# Patient Record
Sex: MALE | Race: WHITE | NOT HISPANIC OR LATINO | Employment: FULL TIME | URBAN - METROPOLITAN AREA
[De-identification: names, ages, dates, MRNs, and addresses within clinical notes are randomized per-mention and may not be internally consistent; named-entity substitution may affect disease eponyms.]

---

## 2017-01-23 ENCOUNTER — OFFICE VISIT - HEALTHEAST (OUTPATIENT)
Dept: CARDIOLOGY | Facility: CLINIC | Age: 62
End: 2017-01-23

## 2017-01-23 DIAGNOSIS — I25.84 CORONARY ATHEROSCLEROSIS DUE TO CALCIFIED CORONARY LESION: ICD-10-CM

## 2017-01-23 DIAGNOSIS — I25.10 CORONARY ATHEROSCLEROSIS DUE TO CALCIFIED CORONARY LESION: ICD-10-CM

## 2017-01-23 LAB
CHOLEST SERPL-MCNC: 135 MG/DL
FASTING STATUS PATIENT QL REPORTED: YES
HDLC SERPL-MCNC: 31 MG/DL
LDLC SERPL CALC-MCNC: 82 MG/DL
TRIGL SERPL-MCNC: 110 MG/DL

## 2017-01-23 ASSESSMENT — MIFFLIN-ST. JEOR: SCORE: 1463.04

## 2017-03-09 ENCOUNTER — COMMUNICATION - HEALTHEAST (OUTPATIENT)
Dept: CARDIOLOGY | Facility: CLINIC | Age: 62
End: 2017-03-09

## 2017-03-09 DIAGNOSIS — I25.10 CORONARY ATHEROSCLEROSIS: ICD-10-CM

## 2017-04-11 ENCOUNTER — COMMUNICATION - HEALTHEAST (OUTPATIENT)
Dept: CARDIOLOGY | Facility: CLINIC | Age: 62
End: 2017-04-11

## 2017-04-11 DIAGNOSIS — E78.5 HYPERLIPIDEMIA: ICD-10-CM

## 2017-07-03 ENCOUNTER — COMMUNICATION - HEALTHEAST (OUTPATIENT)
Dept: CARDIOLOGY | Facility: CLINIC | Age: 62
End: 2017-07-03

## 2017-07-03 DIAGNOSIS — I25.10 CORONARY ATHEROSCLEROSIS: ICD-10-CM

## 2017-07-27 ENCOUNTER — COMMUNICATION - HEALTHEAST (OUTPATIENT)
Dept: CARDIOLOGY | Facility: CLINIC | Age: 62
End: 2017-07-27

## 2017-07-27 DIAGNOSIS — E78.5 HYPERLIPIDEMIA: ICD-10-CM

## 2017-12-02 ENCOUNTER — COMMUNICATION - HEALTHEAST (OUTPATIENT)
Dept: CARDIOLOGY | Facility: CLINIC | Age: 62
End: 2017-12-02

## 2017-12-02 DIAGNOSIS — E78.5 HYPERLIPIDEMIA: ICD-10-CM

## 2018-01-12 ENCOUNTER — COMMUNICATION - HEALTHEAST (OUTPATIENT)
Dept: ADMINISTRATIVE | Facility: CLINIC | Age: 63
End: 2018-01-12

## 2018-02-10 ENCOUNTER — COMMUNICATION - HEALTHEAST (OUTPATIENT)
Dept: CARDIOLOGY | Facility: CLINIC | Age: 63
End: 2018-02-10

## 2018-02-10 DIAGNOSIS — I25.10 CORONARY ATHEROSCLEROSIS: ICD-10-CM

## 2018-02-27 ENCOUNTER — COMMUNICATION - HEALTHEAST (OUTPATIENT)
Dept: CARDIOLOGY | Facility: CLINIC | Age: 63
End: 2018-02-27

## 2018-02-27 DIAGNOSIS — I25.10 CORONARY ATHEROSCLEROSIS: ICD-10-CM

## 2018-03-17 ENCOUNTER — COMMUNICATION - HEALTHEAST (OUTPATIENT)
Dept: CARDIOLOGY | Facility: CLINIC | Age: 63
End: 2018-03-17

## 2018-03-17 DIAGNOSIS — E78.5 HYPERLIPIDEMIA: ICD-10-CM

## 2018-03-30 ENCOUNTER — COMMUNICATION - HEALTHEAST (OUTPATIENT)
Dept: CARDIOLOGY | Facility: CLINIC | Age: 63
End: 2018-03-30

## 2018-03-30 ENCOUNTER — OFFICE VISIT - HEALTHEAST (OUTPATIENT)
Dept: CARDIOLOGY | Facility: CLINIC | Age: 63
End: 2018-03-30

## 2018-03-30 DIAGNOSIS — I25.84 CORONARY ATHEROSCLEROSIS DUE TO CALCIFIED CORONARY LESION: ICD-10-CM

## 2018-03-30 DIAGNOSIS — I25.10 CORONARY ATHEROSCLEROSIS DUE TO CALCIFIED CORONARY LESION: ICD-10-CM

## 2018-03-30 LAB
ANION GAP SERPL CALCULATED.3IONS-SCNC: 9 MMOL/L (ref 5–18)
BUN SERPL-MCNC: 20 MG/DL (ref 8–22)
CALCIUM SERPL-MCNC: 9.6 MG/DL (ref 8.5–10.5)
CHLORIDE BLD-SCNC: 106 MMOL/L (ref 98–107)
CHOLEST SERPL-MCNC: 186 MG/DL
CO2 SERPL-SCNC: 23 MMOL/L (ref 22–31)
CREAT SERPL-MCNC: 0.84 MG/DL (ref 0.7–1.3)
ERYTHROCYTE [DISTWIDTH] IN BLOOD BY AUTOMATED COUNT: 12.5 % (ref 11–14.5)
FASTING STATUS PATIENT QL REPORTED: YES
GFR SERPL CREATININE-BSD FRML MDRD: >60 ML/MIN/1.73M2
GLUCOSE BLD-MCNC: 94 MG/DL (ref 70–125)
HCT VFR BLD AUTO: 51.1 % (ref 40–54)
HDLC SERPL-MCNC: 39 MG/DL
HGB BLD-MCNC: 17.3 G/DL (ref 14–18)
LDLC SERPL CALC-MCNC: 105 MG/DL
MCH RBC QN AUTO: 32.1 PG (ref 27–34)
MCHC RBC AUTO-ENTMCNC: 33.9 G/DL (ref 32–36)
MCV RBC AUTO: 95 FL (ref 80–100)
PLATELET # BLD AUTO: 279 THOU/UL (ref 140–440)
PMV BLD AUTO: 9.8 FL (ref 8.5–12.5)
POTASSIUM BLD-SCNC: 4.7 MMOL/L (ref 3.5–5)
RBC # BLD AUTO: 5.39 MILL/UL (ref 4.4–6.2)
SODIUM SERPL-SCNC: 138 MMOL/L (ref 136–145)
TRIGL SERPL-MCNC: 212 MG/DL
WBC: 7.1 THOU/UL (ref 4–11)

## 2018-03-30 ASSESSMENT — MIFFLIN-ST. JEOR: SCORE: 1516.79

## 2018-05-27 ENCOUNTER — COMMUNICATION - HEALTHEAST (OUTPATIENT)
Dept: CARDIOLOGY | Facility: CLINIC | Age: 63
End: 2018-05-27

## 2018-05-27 DIAGNOSIS — I25.10 CORONARY ATHEROSCLEROSIS: ICD-10-CM

## 2018-06-12 ENCOUNTER — COMMUNICATION - HEALTHEAST (OUTPATIENT)
Dept: CARDIOLOGY | Facility: CLINIC | Age: 63
End: 2018-06-12

## 2018-06-12 DIAGNOSIS — I25.10 CORONARY ATHEROSCLEROSIS: ICD-10-CM

## 2018-07-10 ENCOUNTER — COMMUNICATION - HEALTHEAST (OUTPATIENT)
Dept: CARDIOLOGY | Facility: CLINIC | Age: 63
End: 2018-07-10

## 2018-07-10 DIAGNOSIS — E78.5 HYPERLIPIDEMIA: ICD-10-CM

## 2018-11-08 ENCOUNTER — COMMUNICATION - HEALTHEAST (OUTPATIENT)
Dept: CARDIOLOGY | Facility: CLINIC | Age: 63
End: 2018-11-08

## 2018-11-08 DIAGNOSIS — E78.5 HYPERLIPIDEMIA: ICD-10-CM

## 2018-11-08 DIAGNOSIS — I25.10 CORONARY ATHEROSCLEROSIS: ICD-10-CM

## 2018-11-12 ENCOUNTER — COMMUNICATION - HEALTHEAST (OUTPATIENT)
Dept: CARDIOLOGY | Facility: CLINIC | Age: 63
End: 2018-11-12

## 2018-11-12 DIAGNOSIS — I25.10 CORONARY ATHEROSCLEROSIS: ICD-10-CM

## 2018-11-12 DIAGNOSIS — E78.5 HYPERLIPIDEMIA: ICD-10-CM

## 2018-12-21 ENCOUNTER — COMMUNICATION - HEALTHEAST (OUTPATIENT)
Dept: CARDIOLOGY | Facility: CLINIC | Age: 63
End: 2018-12-21

## 2018-12-21 DIAGNOSIS — I25.10 CORONARY ATHEROSCLEROSIS: ICD-10-CM

## 2019-02-07 ENCOUNTER — COMMUNICATION - HEALTHEAST (OUTPATIENT)
Dept: ADMINISTRATIVE | Facility: CLINIC | Age: 64
End: 2019-02-07

## 2019-02-18 ENCOUNTER — SURGERY - HEALTHEAST (OUTPATIENT)
Dept: CARDIOLOGY | Facility: CLINIC | Age: 64
End: 2019-02-18

## 2019-02-18 ASSESSMENT — MIFFLIN-ST. JEOR
SCORE: 1493.75
SCORE: 1493.75

## 2019-02-19 ENCOUNTER — ANESTHESIA - HEALTHEAST (OUTPATIENT)
Dept: SURGERY | Facility: CLINIC | Age: 64
End: 2019-02-19

## 2019-02-19 ASSESSMENT — MIFFLIN-ST. JEOR
SCORE: 1456.69
SCORE: 1456.69

## 2019-02-20 ENCOUNTER — SURGERY - HEALTHEAST (OUTPATIENT)
Dept: SURGERY | Facility: CLINIC | Age: 64
End: 2019-02-20

## 2019-02-20 ASSESSMENT — MIFFLIN-ST. JEOR
SCORE: 1479.82
SCORE: 1479.82

## 2019-02-21 ASSESSMENT — MIFFLIN-ST. JEOR
SCORE: 1512.48
SCORE: 1512.48

## 2019-02-22 ASSESSMENT — MIFFLIN-ST. JEOR
SCORE: 1495.36
SCORE: 1495.36

## 2019-02-23 ASSESSMENT — MIFFLIN-ST. JEOR
SCORE: 1495.25
SCORE: 1477.56
SCORE: 1477.56
SCORE: 1495.25

## 2019-02-24 ASSESSMENT — MIFFLIN-ST. JEOR
SCORE: 1456.24
SCORE: 1456.24

## 2019-02-25 ENCOUNTER — COMMUNICATION - HEALTHEAST (OUTPATIENT)
Dept: FAMILY MEDICINE | Facility: CLINIC | Age: 64
End: 2019-02-25

## 2019-02-25 ASSESSMENT — MIFFLIN-ST. JEOR
SCORE: 1458.96
SCORE: 1458.96

## 2019-03-01 ENCOUNTER — OFFICE VISIT - HEALTHEAST (OUTPATIENT)
Dept: FAMILY MEDICINE | Facility: CLINIC | Age: 64
End: 2019-03-01

## 2019-03-01 DIAGNOSIS — I10 ESSENTIAL HYPERTENSION: ICD-10-CM

## 2019-03-01 DIAGNOSIS — E78.2 MIXED HYPERLIPIDEMIA: ICD-10-CM

## 2019-03-01 DIAGNOSIS — Z71.89 COUNSELING ON HEALTH CARE DIRECTIVE: ICD-10-CM

## 2019-03-01 DIAGNOSIS — I21.4 NON-ST ELEVATION MYOCARDIAL INFARCTION (NSTEMI) (H): ICD-10-CM

## 2019-03-01 DIAGNOSIS — Z95.1 S/P CABG (CORONARY ARTERY BYPASS GRAFT): ICD-10-CM

## 2019-03-01 ASSESSMENT — MIFFLIN-ST. JEOR: SCORE: 1453.4

## 2019-03-04 ENCOUNTER — AMBULATORY - HEALTHEAST (OUTPATIENT)
Dept: CARDIAC REHAB | Facility: CLINIC | Age: 64
End: 2019-03-04

## 2019-03-04 DIAGNOSIS — Z95.1 S/P CABG (CORONARY ARTERY BYPASS GRAFT): ICD-10-CM

## 2019-03-04 DIAGNOSIS — I21.4 NON-ST ELEVATION MYOCARDIAL INFARCTION (NSTEMI) (H): ICD-10-CM

## 2019-03-07 ENCOUNTER — AMBULATORY - HEALTHEAST (OUTPATIENT)
Dept: CARDIAC REHAB | Facility: CLINIC | Age: 64
End: 2019-03-07

## 2019-03-07 DIAGNOSIS — I21.4 NON-ST ELEVATION MYOCARDIAL INFARCTION (NSTEMI) (H): ICD-10-CM

## 2019-03-07 DIAGNOSIS — Z95.1 S/P CABG (CORONARY ARTERY BYPASS GRAFT): ICD-10-CM

## 2019-03-12 ENCOUNTER — AMBULATORY - HEALTHEAST (OUTPATIENT)
Dept: CARDIAC REHAB | Facility: CLINIC | Age: 64
End: 2019-03-12

## 2019-03-12 DIAGNOSIS — I21.4 NON-ST ELEVATION MYOCARDIAL INFARCTION (NSTEMI) (H): ICD-10-CM

## 2019-03-12 DIAGNOSIS — Z95.1 S/P CABG (CORONARY ARTERY BYPASS GRAFT): ICD-10-CM

## 2019-03-14 ENCOUNTER — AMBULATORY - HEALTHEAST (OUTPATIENT)
Dept: CARDIAC REHAB | Facility: CLINIC | Age: 64
End: 2019-03-14

## 2019-03-14 DIAGNOSIS — Z95.1 S/P CABG (CORONARY ARTERY BYPASS GRAFT): ICD-10-CM

## 2019-03-14 DIAGNOSIS — I21.4 NON-ST ELEVATION MYOCARDIAL INFARCTION (NSTEMI) (H): ICD-10-CM

## 2019-03-19 ENCOUNTER — AMBULATORY - HEALTHEAST (OUTPATIENT)
Dept: CARDIAC REHAB | Facility: CLINIC | Age: 64
End: 2019-03-19

## 2019-03-19 DIAGNOSIS — I21.4 NON-ST ELEVATION MYOCARDIAL INFARCTION (NSTEMI) (H): ICD-10-CM

## 2019-03-19 DIAGNOSIS — Z95.1 S/P CABG (CORONARY ARTERY BYPASS GRAFT): ICD-10-CM

## 2019-03-21 ENCOUNTER — AMBULATORY - HEALTHEAST (OUTPATIENT)
Dept: CARDIAC REHAB | Facility: CLINIC | Age: 64
End: 2019-03-21

## 2019-03-21 DIAGNOSIS — Z95.1 S/P CABG (CORONARY ARTERY BYPASS GRAFT): ICD-10-CM

## 2019-03-21 DIAGNOSIS — I21.4 NON-ST ELEVATION MYOCARDIAL INFARCTION (NSTEMI) (H): ICD-10-CM

## 2019-03-26 ENCOUNTER — OFFICE VISIT - HEALTHEAST (OUTPATIENT)
Dept: CARDIOLOGY | Facility: CLINIC | Age: 64
End: 2019-03-26

## 2019-03-26 ENCOUNTER — AMBULATORY - HEALTHEAST (OUTPATIENT)
Dept: CARDIAC REHAB | Facility: CLINIC | Age: 64
End: 2019-03-26

## 2019-03-26 DIAGNOSIS — G62.9 NEUROPATHY: ICD-10-CM

## 2019-03-26 DIAGNOSIS — I21.4 NON-ST ELEVATION MYOCARDIAL INFARCTION (NSTEMI) (H): ICD-10-CM

## 2019-03-26 DIAGNOSIS — Z95.1 S/P CABG (CORONARY ARTERY BYPASS GRAFT): ICD-10-CM

## 2019-03-26 ASSESSMENT — MIFFLIN-ST. JEOR: SCORE: 1462.48

## 2019-03-28 ENCOUNTER — AMBULATORY - HEALTHEAST (OUTPATIENT)
Dept: CARDIAC REHAB | Facility: CLINIC | Age: 64
End: 2019-03-28

## 2019-03-28 DIAGNOSIS — I21.4 NON-ST ELEVATION MYOCARDIAL INFARCTION (NSTEMI) (H): ICD-10-CM

## 2019-03-28 DIAGNOSIS — Z95.1 S/P CABG (CORONARY ARTERY BYPASS GRAFT): ICD-10-CM

## 2019-04-02 ENCOUNTER — AMBULATORY - HEALTHEAST (OUTPATIENT)
Dept: CARDIAC REHAB | Facility: CLINIC | Age: 64
End: 2019-04-02

## 2019-04-02 DIAGNOSIS — I21.4 NON-ST ELEVATION MYOCARDIAL INFARCTION (NSTEMI) (H): ICD-10-CM

## 2019-04-02 DIAGNOSIS — Z95.1 S/P CABG (CORONARY ARTERY BYPASS GRAFT): ICD-10-CM

## 2019-04-04 ENCOUNTER — AMBULATORY - HEALTHEAST (OUTPATIENT)
Dept: CARDIAC REHAB | Facility: CLINIC | Age: 64
End: 2019-04-04

## 2019-04-04 DIAGNOSIS — Z95.1 S/P CABG (CORONARY ARTERY BYPASS GRAFT): ICD-10-CM

## 2019-04-04 DIAGNOSIS — I21.4 NON-ST ELEVATION MYOCARDIAL INFARCTION (NSTEMI) (H): ICD-10-CM

## 2019-04-09 ENCOUNTER — AMBULATORY - HEALTHEAST (OUTPATIENT)
Dept: CARDIAC REHAB | Facility: CLINIC | Age: 64
End: 2019-04-09

## 2019-04-09 DIAGNOSIS — I21.4 NON-ST ELEVATION MYOCARDIAL INFARCTION (NSTEMI) (H): ICD-10-CM

## 2019-04-09 DIAGNOSIS — Z95.1 S/P CABG (CORONARY ARTERY BYPASS GRAFT): ICD-10-CM

## 2019-04-11 ENCOUNTER — AMBULATORY - HEALTHEAST (OUTPATIENT)
Dept: CARDIAC REHAB | Facility: CLINIC | Age: 64
End: 2019-04-11

## 2019-04-11 DIAGNOSIS — Z95.1 S/P CABG (CORONARY ARTERY BYPASS GRAFT): ICD-10-CM

## 2019-04-11 DIAGNOSIS — I21.4 NON-ST ELEVATION MYOCARDIAL INFARCTION (NSTEMI) (H): ICD-10-CM

## 2019-04-12 ENCOUNTER — OFFICE VISIT - HEALTHEAST (OUTPATIENT)
Dept: CARDIOLOGY | Facility: CLINIC | Age: 64
End: 2019-04-12

## 2019-04-12 DIAGNOSIS — I25.10 CORONARY ARTERY DISEASE INVOLVING NATIVE CORONARY ARTERY OF NATIVE HEART WITHOUT ANGINA PECTORIS: ICD-10-CM

## 2019-04-12 DIAGNOSIS — Z95.1 S/P CABG (CORONARY ARTERY BYPASS GRAFT): ICD-10-CM

## 2019-04-12 LAB — LDLC SERPL CALC-MCNC: 128 MG/DL

## 2019-04-12 ASSESSMENT — MIFFLIN-ST. JEOR: SCORE: 1467.01

## 2019-04-15 ENCOUNTER — COMMUNICATION - HEALTHEAST (OUTPATIENT)
Dept: CARDIOLOGY | Facility: CLINIC | Age: 64
End: 2019-04-15

## 2019-04-15 DIAGNOSIS — Z95.1 S/P CABG (CORONARY ARTERY BYPASS GRAFT): ICD-10-CM

## 2019-04-15 DIAGNOSIS — I25.10 CORONARY ARTERY DISEASE INVOLVING NATIVE CORONARY ARTERY OF NATIVE HEART WITHOUT ANGINA PECTORIS: ICD-10-CM

## 2019-04-16 ENCOUNTER — AMBULATORY - HEALTHEAST (OUTPATIENT)
Dept: CARDIAC REHAB | Facility: CLINIC | Age: 64
End: 2019-04-16

## 2019-04-16 ENCOUNTER — COMMUNICATION - HEALTHEAST (OUTPATIENT)
Dept: CARDIOLOGY | Facility: CLINIC | Age: 64
End: 2019-04-16

## 2019-04-16 DIAGNOSIS — I21.4 NON-ST ELEVATION MYOCARDIAL INFARCTION (NSTEMI) (H): ICD-10-CM

## 2019-04-16 DIAGNOSIS — Z95.1 S/P CABG (CORONARY ARTERY BYPASS GRAFT): ICD-10-CM

## 2019-04-16 DIAGNOSIS — E78.5 HYPERLIPIDEMIA: ICD-10-CM

## 2019-04-18 ENCOUNTER — AMBULATORY - HEALTHEAST (OUTPATIENT)
Dept: CARDIAC REHAB | Facility: CLINIC | Age: 64
End: 2019-04-18

## 2019-04-18 DIAGNOSIS — I21.4 NON-ST ELEVATION MYOCARDIAL INFARCTION (NSTEMI) (H): ICD-10-CM

## 2019-04-18 DIAGNOSIS — Z95.1 S/P CABG (CORONARY ARTERY BYPASS GRAFT): ICD-10-CM

## 2019-04-24 ENCOUNTER — RECORDS - HEALTHEAST (OUTPATIENT)
Dept: ADMINISTRATIVE | Facility: OTHER | Age: 64
End: 2019-04-24

## 2019-04-25 ENCOUNTER — AMBULATORY - HEALTHEAST (OUTPATIENT)
Dept: CARDIAC REHAB | Facility: CLINIC | Age: 64
End: 2019-04-25

## 2019-04-25 DIAGNOSIS — I21.4 NON-ST ELEVATION MYOCARDIAL INFARCTION (NSTEMI) (H): ICD-10-CM

## 2019-04-25 DIAGNOSIS — Z95.1 S/P CABG (CORONARY ARTERY BYPASS GRAFT): ICD-10-CM

## 2019-05-02 ENCOUNTER — AMBULATORY - HEALTHEAST (OUTPATIENT)
Dept: CARDIAC REHAB | Facility: CLINIC | Age: 64
End: 2019-05-02

## 2019-05-02 DIAGNOSIS — Z95.1 S/P CABG (CORONARY ARTERY BYPASS GRAFT): ICD-10-CM

## 2019-05-02 DIAGNOSIS — I21.4 NON-ST ELEVATION MYOCARDIAL INFARCTION (NSTEMI) (H): ICD-10-CM

## 2019-05-09 ENCOUNTER — COMMUNICATION - HEALTHEAST (OUTPATIENT)
Dept: ADMINISTRATIVE | Facility: CLINIC | Age: 64
End: 2019-05-09

## 2019-05-22 ENCOUNTER — OFFICE VISIT - HEALTHEAST (OUTPATIENT)
Dept: FAMILY MEDICINE | Facility: CLINIC | Age: 64
End: 2019-05-22

## 2019-05-22 ENCOUNTER — COMMUNICATION - HEALTHEAST (OUTPATIENT)
Dept: FAMILY MEDICINE | Facility: CLINIC | Age: 64
End: 2019-05-22

## 2019-05-22 DIAGNOSIS — Z12.11 SCREEN FOR COLON CANCER: ICD-10-CM

## 2019-05-22 DIAGNOSIS — I25.2 HISTORY OF NON-ST ELEVATION MYOCARDIAL INFARCTION (NSTEMI): ICD-10-CM

## 2019-05-22 ASSESSMENT — MIFFLIN-ST. JEOR: SCORE: 1480.62

## 2019-05-23 ENCOUNTER — AMBULATORY - HEALTHEAST (OUTPATIENT)
Dept: CARDIAC REHAB | Facility: CLINIC | Age: 64
End: 2019-05-23

## 2019-05-23 DIAGNOSIS — I21.4 NON-ST ELEVATION MYOCARDIAL INFARCTION (NSTEMI) (H): ICD-10-CM

## 2019-05-23 DIAGNOSIS — Z95.1 S/P CABG (CORONARY ARTERY BYPASS GRAFT): ICD-10-CM

## 2019-05-30 ENCOUNTER — AMBULATORY - HEALTHEAST (OUTPATIENT)
Dept: CARDIAC REHAB | Facility: CLINIC | Age: 64
End: 2019-05-30

## 2019-05-30 DIAGNOSIS — I21.4 NON-ST ELEVATION MYOCARDIAL INFARCTION (NSTEMI) (H): ICD-10-CM

## 2019-05-30 DIAGNOSIS — Z95.1 S/P CABG (CORONARY ARTERY BYPASS GRAFT): ICD-10-CM

## 2019-06-06 ENCOUNTER — AMBULATORY - HEALTHEAST (OUTPATIENT)
Dept: CARDIAC REHAB | Facility: CLINIC | Age: 64
End: 2019-06-06

## 2019-06-06 DIAGNOSIS — Z95.1 S/P CABG (CORONARY ARTERY BYPASS GRAFT): ICD-10-CM

## 2019-06-06 DIAGNOSIS — I21.4 NON-ST ELEVATION MYOCARDIAL INFARCTION (NSTEMI) (H): ICD-10-CM

## 2019-06-20 ENCOUNTER — AMBULATORY - HEALTHEAST (OUTPATIENT)
Dept: CARDIAC REHAB | Facility: CLINIC | Age: 64
End: 2019-06-20

## 2019-06-20 DIAGNOSIS — I21.4 NON-ST ELEVATION MYOCARDIAL INFARCTION (NSTEMI) (H): ICD-10-CM

## 2019-06-20 DIAGNOSIS — Z95.1 S/P CABG (CORONARY ARTERY BYPASS GRAFT): ICD-10-CM

## 2019-07-06 ENCOUNTER — COMMUNICATION - HEALTHEAST (OUTPATIENT)
Dept: CARDIOLOGY | Facility: CLINIC | Age: 64
End: 2019-07-06

## 2019-07-06 DIAGNOSIS — Z95.1 S/P CABG (CORONARY ARTERY BYPASS GRAFT): ICD-10-CM

## 2019-07-11 ENCOUNTER — AMBULATORY - HEALTHEAST (OUTPATIENT)
Dept: CARDIAC REHAB | Facility: CLINIC | Age: 64
End: 2019-07-11

## 2019-07-11 DIAGNOSIS — I21.4 NON-ST ELEVATION MYOCARDIAL INFARCTION (NSTEMI) (H): ICD-10-CM

## 2019-07-11 DIAGNOSIS — Z95.1 S/P CABG (CORONARY ARTERY BYPASS GRAFT): ICD-10-CM

## 2019-07-15 ENCOUNTER — COMMUNICATION - HEALTHEAST (OUTPATIENT)
Dept: CARDIOLOGY | Facility: CLINIC | Age: 64
End: 2019-07-15

## 2019-07-15 DIAGNOSIS — Z95.1 S/P CABG (CORONARY ARTERY BYPASS GRAFT): ICD-10-CM

## 2019-07-17 ENCOUNTER — COMMUNICATION - HEALTHEAST (OUTPATIENT)
Dept: CARDIOLOGY | Facility: CLINIC | Age: 64
End: 2019-07-17

## 2019-07-17 DIAGNOSIS — Z95.1 S/P CABG (CORONARY ARTERY BYPASS GRAFT): ICD-10-CM

## 2019-07-18 ENCOUNTER — AMBULATORY - HEALTHEAST (OUTPATIENT)
Dept: CARDIAC REHAB | Facility: CLINIC | Age: 64
End: 2019-07-18

## 2019-07-18 DIAGNOSIS — I21.4 NON-ST ELEVATION MYOCARDIAL INFARCTION (NSTEMI) (H): ICD-10-CM

## 2019-07-18 DIAGNOSIS — Z95.1 S/P CABG (CORONARY ARTERY BYPASS GRAFT): ICD-10-CM

## 2019-07-25 ENCOUNTER — AMBULATORY - HEALTHEAST (OUTPATIENT)
Dept: CARDIAC REHAB | Facility: CLINIC | Age: 64
End: 2019-07-25

## 2019-07-25 ENCOUNTER — OFFICE VISIT - HEALTHEAST (OUTPATIENT)
Dept: CARDIOLOGY | Facility: CLINIC | Age: 64
End: 2019-07-25

## 2019-07-25 DIAGNOSIS — Z95.1 S/P CABG (CORONARY ARTERY BYPASS GRAFT): ICD-10-CM

## 2019-07-25 DIAGNOSIS — I25.10 CORONARY ARTERY DISEASE INVOLVING NATIVE CORONARY ARTERY OF NATIVE HEART WITHOUT ANGINA PECTORIS: ICD-10-CM

## 2019-07-25 DIAGNOSIS — E78.2 MIXED HYPERLIPIDEMIA: ICD-10-CM

## 2019-07-25 DIAGNOSIS — I21.4 NON-ST ELEVATION MYOCARDIAL INFARCTION (NSTEMI) (H): ICD-10-CM

## 2019-07-25 LAB
CHOLEST SERPL-MCNC: 84 MG/DL
FASTING STATUS PATIENT QL REPORTED: YES
HDLC SERPL-MCNC: 38 MG/DL
LDLC SERPL CALC-MCNC: 14 MG/DL
TRIGL SERPL-MCNC: 159 MG/DL

## 2019-07-25 ASSESSMENT — MIFFLIN-ST. JEOR: SCORE: 1489.69

## 2019-07-26 ENCOUNTER — COMMUNICATION - HEALTHEAST (OUTPATIENT)
Dept: CARDIOLOGY | Facility: CLINIC | Age: 64
End: 2019-07-26

## 2019-08-22 ENCOUNTER — RECORDS - HEALTHEAST (OUTPATIENT)
Dept: ADMINISTRATIVE | Facility: OTHER | Age: 64
End: 2019-08-22

## 2019-09-27 ENCOUNTER — OFFICE VISIT - HEALTHEAST (OUTPATIENT)
Dept: FAMILY MEDICINE | Facility: CLINIC | Age: 64
End: 2019-09-27

## 2019-09-27 DIAGNOSIS — Z23 NEEDS FLU SHOT: ICD-10-CM

## 2019-09-27 DIAGNOSIS — H61.21 IMPACTED CERUMEN OF RIGHT EAR: ICD-10-CM

## 2019-09-27 ASSESSMENT — MIFFLIN-ST. JEOR: SCORE: 1512.37

## 2019-10-02 ENCOUNTER — COMMUNICATION - HEALTHEAST (OUTPATIENT)
Dept: CARDIOLOGY | Facility: CLINIC | Age: 64
End: 2019-10-02

## 2019-10-02 DIAGNOSIS — Z95.1 S/P CABG (CORONARY ARTERY BYPASS GRAFT): ICD-10-CM

## 2019-10-13 ENCOUNTER — COMMUNICATION - HEALTHEAST (OUTPATIENT)
Dept: CARDIOLOGY | Facility: CLINIC | Age: 64
End: 2019-10-13

## 2019-10-13 DIAGNOSIS — E78.5 HYPERLIPIDEMIA: ICD-10-CM

## 2020-01-09 ENCOUNTER — COMMUNICATION - HEALTHEAST (OUTPATIENT)
Dept: FAMILY MEDICINE | Facility: CLINIC | Age: 65
End: 2020-01-09

## 2020-01-10 ENCOUNTER — COMMUNICATION - HEALTHEAST (OUTPATIENT)
Dept: CARDIOLOGY | Facility: CLINIC | Age: 65
End: 2020-01-10

## 2020-01-10 DIAGNOSIS — Z95.1 S/P CABG (CORONARY ARTERY BYPASS GRAFT): ICD-10-CM

## 2020-01-13 ENCOUNTER — COMMUNICATION - HEALTHEAST (OUTPATIENT)
Dept: CARDIOLOGY | Facility: CLINIC | Age: 65
End: 2020-01-13

## 2020-01-13 DIAGNOSIS — I25.10 CORONARY ARTERY DISEASE INVOLVING NATIVE CORONARY ARTERY OF NATIVE HEART WITHOUT ANGINA PECTORIS: ICD-10-CM

## 2020-01-13 DIAGNOSIS — Z95.1 S/P CABG (CORONARY ARTERY BYPASS GRAFT): ICD-10-CM

## 2020-01-14 ENCOUNTER — COMMUNICATION - HEALTHEAST (OUTPATIENT)
Dept: CARDIOLOGY | Facility: CLINIC | Age: 65
End: 2020-01-14

## 2020-03-13 ENCOUNTER — OFFICE VISIT - HEALTHEAST (OUTPATIENT)
Dept: CARDIOLOGY | Facility: CLINIC | Age: 65
End: 2020-03-13

## 2020-03-13 DIAGNOSIS — I10 ESSENTIAL HYPERTENSION: ICD-10-CM

## 2020-03-13 DIAGNOSIS — I25.10 CORONARY ARTERY DISEASE INVOLVING NATIVE CORONARY ARTERY OF NATIVE HEART WITHOUT ANGINA PECTORIS: ICD-10-CM

## 2020-03-13 DIAGNOSIS — E78.01 FAMILIAL HYPERCHOLESTEROLEMIA: ICD-10-CM

## 2020-03-13 ASSESSMENT — MIFFLIN-ST. JEOR: SCORE: 1512.37

## 2020-03-24 ENCOUNTER — COMMUNICATION - HEALTHEAST (OUTPATIENT)
Dept: CARDIOLOGY | Facility: CLINIC | Age: 65
End: 2020-03-24

## 2020-03-24 DIAGNOSIS — Z95.1 S/P CABG (CORONARY ARTERY BYPASS GRAFT): ICD-10-CM

## 2020-03-25 ENCOUNTER — RECORDS - HEALTHEAST (OUTPATIENT)
Dept: ADMINISTRATIVE | Facility: OTHER | Age: 65
End: 2020-03-25

## 2020-04-10 ENCOUNTER — COMMUNICATION - HEALTHEAST (OUTPATIENT)
Dept: CARDIOLOGY | Facility: CLINIC | Age: 65
End: 2020-04-10

## 2020-04-10 DIAGNOSIS — E78.5 HYPERLIPIDEMIA: ICD-10-CM

## 2020-05-29 ENCOUNTER — COMMUNICATION - HEALTHEAST (OUTPATIENT)
Dept: CARDIOLOGY | Facility: CLINIC | Age: 65
End: 2020-05-29

## 2020-06-01 ENCOUNTER — COMMUNICATION - HEALTHEAST (OUTPATIENT)
Dept: CARDIOLOGY | Facility: CLINIC | Age: 65
End: 2020-06-01

## 2020-06-01 DIAGNOSIS — I25.10 CORONARY ARTERY DISEASE INVOLVING NATIVE CORONARY ARTERY OF NATIVE HEART WITHOUT ANGINA PECTORIS: ICD-10-CM

## 2020-06-01 DIAGNOSIS — Z95.1 S/P CABG (CORONARY ARTERY BYPASS GRAFT): ICD-10-CM

## 2020-06-02 ENCOUNTER — COMMUNICATION - HEALTHEAST (OUTPATIENT)
Dept: CARDIOLOGY | Facility: CLINIC | Age: 65
End: 2020-06-02

## 2020-06-02 DIAGNOSIS — Z95.1 S/P CABG (CORONARY ARTERY BYPASS GRAFT): ICD-10-CM

## 2020-06-02 DIAGNOSIS — I25.10 CORONARY ARTERY DISEASE INVOLVING NATIVE CORONARY ARTERY OF NATIVE HEART WITHOUT ANGINA PECTORIS: ICD-10-CM

## 2020-06-25 ENCOUNTER — COMMUNICATION - HEALTHEAST (OUTPATIENT)
Dept: CARDIOLOGY | Facility: CLINIC | Age: 65
End: 2020-06-25

## 2020-06-25 DIAGNOSIS — Z95.1 S/P CABG (CORONARY ARTERY BYPASS GRAFT): ICD-10-CM

## 2020-10-01 ENCOUNTER — COMMUNICATION - HEALTHEAST (OUTPATIENT)
Dept: CARDIOLOGY | Facility: CLINIC | Age: 65
End: 2020-10-01

## 2020-10-01 DIAGNOSIS — E78.5 HYPERLIPIDEMIA: ICD-10-CM

## 2020-12-16 ENCOUNTER — COMMUNICATION - HEALTHEAST (OUTPATIENT)
Dept: CARDIOLOGY | Facility: CLINIC | Age: 65
End: 2020-12-16

## 2020-12-16 DIAGNOSIS — Z95.1 S/P CABG (CORONARY ARTERY BYPASS GRAFT): ICD-10-CM

## 2020-12-16 RX ORDER — METOPROLOL TARTRATE 50 MG
TABLET ORAL
Qty: 180 TABLET | Refills: 1 | Status: SHIPPED | OUTPATIENT
Start: 2020-12-16 | End: 2021-12-27

## 2021-03-01 ENCOUNTER — COMMUNICATION - HEALTHEAST (OUTPATIENT)
Dept: CARDIOLOGY | Facility: CLINIC | Age: 66
End: 2021-03-01

## 2021-03-01 ENCOUNTER — COMMUNICATION - HEALTHEAST (OUTPATIENT)
Dept: ENDOCRINOLOGY | Facility: CLINIC | Age: 66
End: 2021-03-01

## 2021-03-01 DIAGNOSIS — E78.5 HYPERLIPIDEMIA: ICD-10-CM

## 2021-03-02 ENCOUNTER — COMMUNICATION - HEALTHEAST (OUTPATIENT)
Dept: CARDIOLOGY | Facility: CLINIC | Age: 66
End: 2021-03-02

## 2021-03-02 DIAGNOSIS — E78.5 HYPERLIPIDEMIA: ICD-10-CM

## 2021-03-24 ENCOUNTER — COMMUNICATION - HEALTHEAST (OUTPATIENT)
Dept: CARDIOLOGY | Facility: CLINIC | Age: 66
End: 2021-03-24

## 2021-03-24 DIAGNOSIS — E78.5 HYPERLIPIDEMIA: ICD-10-CM

## 2021-03-24 RX ORDER — ROSUVASTATIN CALCIUM 40 MG/1
TABLET, COATED ORAL
Qty: 90 TABLET | Refills: 0 | Status: SHIPPED | OUTPATIENT
Start: 2021-03-24 | End: 2021-09-13

## 2021-03-24 RX ORDER — EZETIMIBE 10 MG/1
TABLET ORAL
Qty: 90 TABLET | Refills: 0 | Status: SHIPPED | OUTPATIENT
Start: 2021-03-24 | End: 2023-02-28

## 2021-04-09 ENCOUNTER — COMMUNICATION - HEALTHEAST (OUTPATIENT)
Dept: CARDIOLOGY | Facility: CLINIC | Age: 66
End: 2021-04-09

## 2021-04-09 ENCOUNTER — OFFICE VISIT - HEALTHEAST (OUTPATIENT)
Dept: CARDIOLOGY | Facility: CLINIC | Age: 66
End: 2021-04-09

## 2021-04-09 DIAGNOSIS — E78.01 FAMILIAL HYPERCHOLESTEROLEMIA: ICD-10-CM

## 2021-04-09 DIAGNOSIS — E78.5 HYPERLIPIDEMIA: ICD-10-CM

## 2021-04-09 DIAGNOSIS — I25.10 CORONARY ARTERY DISEASE INVOLVING NATIVE CORONARY ARTERY OF NATIVE HEART WITHOUT ANGINA PECTORIS: ICD-10-CM

## 2021-04-09 LAB
ANION GAP SERPL CALCULATED.3IONS-SCNC: 8 MMOL/L (ref 5–18)
BUN SERPL-MCNC: 15 MG/DL (ref 8–22)
CALCIUM SERPL-MCNC: 8.8 MG/DL (ref 8.5–10.5)
CHLORIDE BLD-SCNC: 108 MMOL/L (ref 98–107)
CHOLEST SERPL-MCNC: 95 MG/DL
CO2 SERPL-SCNC: 22 MMOL/L (ref 22–31)
CREAT SERPL-MCNC: 0.93 MG/DL (ref 0.7–1.3)
ERYTHROCYTE [DISTWIDTH] IN BLOOD BY AUTOMATED COUNT: 13.2 % (ref 11–14.5)
FASTING STATUS PATIENT QL REPORTED: ABNORMAL
GFR SERPL CREATININE-BSD FRML MDRD: >60 ML/MIN/1.73M2
GLUCOSE BLD-MCNC: 95 MG/DL (ref 70–125)
HCT VFR BLD AUTO: 49.8 % (ref 40–54)
HDLC SERPL-MCNC: 39 MG/DL
HGB BLD-MCNC: 17.2 G/DL (ref 14–18)
LDLC SERPL CALC-MCNC: 14 MG/DL
MCH RBC QN AUTO: 32.1 PG (ref 27–34)
MCHC RBC AUTO-ENTMCNC: 34.5 G/DL (ref 32–36)
MCV RBC AUTO: 93 FL (ref 80–100)
PLATELET # BLD AUTO: 284 THOU/UL (ref 140–440)
PMV BLD AUTO: 10.2 FL (ref 8.5–12.5)
POTASSIUM BLD-SCNC: 3.8 MMOL/L (ref 3.5–5)
RBC # BLD AUTO: 5.36 MILL/UL (ref 4.4–6.2)
SODIUM SERPL-SCNC: 138 MMOL/L (ref 136–145)
TRIGL SERPL-MCNC: 210 MG/DL
WBC: 7.5 THOU/UL (ref 4–11)

## 2021-04-09 RX ORDER — LISINOPRIL 10 MG/1
10 TABLET ORAL DAILY
Qty: 90 TABLET | Refills: 3 | Status: SHIPPED | OUTPATIENT
Start: 2021-04-09 | End: 2022-03-25

## 2021-04-09 ASSESSMENT — MIFFLIN-ST. JEOR: SCORE: 1521.44

## 2021-04-25 ENCOUNTER — COMMUNICATION - HEALTHEAST (OUTPATIENT)
Dept: CARDIOLOGY | Facility: CLINIC | Age: 66
End: 2021-04-25

## 2021-05-27 ENCOUNTER — RECORDS - HEALTHEAST (OUTPATIENT)
Dept: ADMINISTRATIVE | Facility: CLINIC | Age: 66
End: 2021-05-27

## 2021-05-27 NOTE — PATIENT INSTRUCTIONS - HE
Eleuterio Camejo,    It was a pleasure to see you today at the [facility].     My recommendations after this visit include:    Cholesterol check    CHANDAN Lozada MD, FACC, KAITLYN

## 2021-05-27 NOTE — TELEPHONE ENCOUNTER
Called patient regarding LDL value. He is agreeable to starting Repatha and this was discussed with [facility] at last visit. Rx sent in. Information sent in the mail per patient request. He understands that this involves a prior authorization and may take a few weeks for him to have access to medication. Pt does have commercial insurance- 5 dollar co-pay card included in informational folder that is being mailed to the patient. -[facility] [place]

## 2021-05-27 NOTE — PROGRESS NOTES
"Cardiology Progress Note    Assessment:  Coronary artery disease status post recent non-ST segment elevation microinfarction and coronary artery bypass graft surgery(LIMA to LAD and vein grafts to diagonal branch, obtuse marginal 3 and RPDA) in February 2019, stable, no angina  Hyperlipidemia on maximal dose of Crestor and Zetia  Hypertension, off antihypertensive medications, normal blood pressure readings    Plan:  Direct LDL- consider biological agents if LDLs is not optimal  Continue cardiac rehab    Follow-up in 3 months    Subjective:   This is 63 y.o. male who comes in today for follow-up visit.  He developed acute onset of chest discomfort in early February of this year.  He was admitted and was found to have elevated troponin.  Coronary angiogram revealed multivessel coronary artery disease.  He underwent coronary artery bypass graft surgery.  He did well after the surgery.  He is attending cardiac rehab program.  He is getting stronger every day.  He denies any exertional chest pain.  His weight has been stable.  He has no PND and orthopnea.  Atenolol and lisinopril were discontinued prior to discharge.  Blood pressure readings have been good during cardiac rehab.    Review of Systems:   General: WNL  Eyes: WNL  Ears/Nose/Throat: WNL  Lungs: WNL  Heart: WNL  Stomach: WNL  Bladder: WNL  Muscle/Joints: WNL  Skin: WNL  Nervous System: WNL  Mental Health: WNL     Blood: WNL    Objective:   /88 (Patient Site: Left Arm, Patient Position: Sitting, Cuff Size: Adult Regular)   Pulse 60   Resp 16   Ht 5' 6.25\" (1.683 m)   Wt 161 lb (73 kg)   BMI 25.79 kg/m    Physical Exam:  GENERAL: no distress  NECK: No JVD  LUNGS: Clear to auscultation.  CARDIAC: regular rhythm, S1 & S2 normal.  No heaves, thrills, gallops or murmurs.  ABDOMEN: flat, negative hepatosplenomegaly, soft and non-tender.  EXTREMITIES: No evidence of cyanosis, clubbing or edema.    Current Outpatient Medications   Medication Sig Dispense " Refill     acetaminophen (TYLENOL) 325 MG tablet Take 2 tablets (650 mg total) by mouth every 6 (six) hours as needed for pain.  0     aspirin 81 mg chewable tablet Chew 81 mg daily.       clopidogrel (PLAVIX) 75 mg tablet Take 1 tablet (75 mg total) by mouth daily. 30 tablet 10     ezetimibe (ZETIA) 10 mg tablet Take 1 tablet (10 mg total) by mouth daily. 90 tablet 1     metoprolol tartrate (LOPRESSOR) 50 MG tablet Take 1 tablet (50 mg total) by mouth 2 (two) times a day. 60 tablet 3     rosuvastatin (CRESTOR) 40 MG tablet Take 1 tablet (40 mg total) by mouth daily. 90 tablet 1     sodium phosphates 133 mL (FOR FLEET) 19-7 gram/118 mL Enem rectal enema As needed  0     No current facility-administered medications for this visit.        Cardiographics:      Echocardiogram: February 2019    Left ventricle ejection fraction is normal. The calculated left ventricular ejection fraction is 66%.    Normal left ventricular size and systolic function.    Normal right ventricular size and systolic function.    Mild mitral regurgitation      Coronary angio: February 2019  LM normal  LAD mod to severe dz in prox LAD; severe dz in mid with JANEL 2 flow into LAD with collaterals via RCA; first diagonal stent 100%  with collaterals  Circ OM3 small 100% with collaterals  RCA mild to mod diffuse dz with stent in %  with collaterals via LAD     LVEDP 11mmHg  Lab Results:       Lab Results   Component Value Date    CHOL 141 02/18/2019    CHOL 186 03/30/2018    CHOL 135 01/23/2017     Lab Results   Component Value Date    HDL 32 (L) 02/18/2019    HDL 39 (L) 03/30/2018    HDL 31 (L) 01/23/2017     Lab Results   Component Value Date    LDLCALC 85 02/18/2019    LDLCALC 105 03/30/2018    LDLCALC 82 01/23/2017     Lab Results   Component Value Date    TRIG 118 02/18/2019    TRIG 212 (H) 03/30/2018    TRIG 110 01/23/2017     No results found for: BNP    Mehran (Michael)  MD [place]

## 2021-05-27 NOTE — PROGRESS NOTES
CARDIOTHORACIC SURGERY FOLLOW-UP VISIT     Eleuterio Camejo   1955   [id number]      Reason for visit: Post operative clinic visit. Patient underwent CABGx4 with Dr. Sandoval on 2/20/2019.    HPI: Eleuterio Camejo is a 63 y.o. year old male seen in clinic for a routine follow-up appointment after surgery. Patient has past medical history as below. Hospital course was unremarkable. Patient was discharged to home.    Patient has been doing well since discharge. Does complain of LUE numbness, tingling, and weakness since surgery although this is improving. Patient did follow-up with primary care since leaving the hospital. Reports that incision is healing well. Denies fevers, peripheral edema. Appetite is improving and patient is voiding without difficulty. Weight has been stable. Pain management at this point with occasional Tylenol. Patient has been attending cardiac rehab and this is going well. Patient is on Plavix for one year following CABG for graft patency protection.     PAST MEDICAL HISTORY:  Past Medical History:   Diagnosis Date     Coronary artery disease 2003    Stent placement x3     Essential hypertension      Hyperlipidemia      Peptic ulcer disease      Postoperative nausea and vomiting        PAST SURGICAL HISTORY:  Past Surgical History:   Procedure Laterality Date     CV CORONARY ANGIOGRAM N/A 2/18/2019    Procedure: Coronary Angiogram;  Surgeon: Sesar Macdonald MD;  Location: [facility] Cath Lab;  Service: Cardiology     CV LEFT HEART CATHETERIZATION WO LEFT VETRICULOGRAM Left 2/18/2019    Procedure: Left Heart Catheterization Without Left Ventriculogram;  Surgeon: Sesar Macdonald MD;  Location: [facility] Cath Lab;  Service: Cardiology     DE CABG, ARTERIAL, FOUR+ N/A 2/20/2019    Procedure: anesthesia, transesophageal echocardiogram, CORONARY ARTERY BYPASS GRAFT X4, left internal mammary artery, endoscopic vein harvest;  Surgeon: Radha Sandoval MD;  Location: [facility]  Main OR;  Service: Cardiovascular     IA INSERT INTRACORONARY STENT  2003    x3- Cath Stent Placement;  Proc Date: 01/01/2003;  Comments: LIZ MID RIGHT POSTEIOR ; ; PROX FIRST DIAGNOL     TONSILLECTOMY       TUMOR REMOVAL      Scalp.  Benign.       CURRENT MEDICATIONS:     Current Outpatient Medications:      acetaminophen (TYLENOL) 325 MG tablet, Take 2 tablets (650 mg total) by mouth every 6 (six) hours as needed for pain., Disp: , Rfl: 0     aspirin 81 mg chewable tablet, Chew 81 mg daily., Disp: , Rfl:      clopidogrel (PLAVIX) 75 mg tablet, Take 1 tablet (75 mg total) by mouth daily., Disp: 30 tablet, Rfl: 11     docusate sodium (COLACE) 100 MG capsule, Take 1 capsule (100 mg total) by mouth 2 (two) times a day. (Patient taking differently: Take by mouth 2 (two) times a day.    ), Disp: , Rfl: 0     ezetimibe (ZETIA) 10 mg tablet, Take 1 tablet (10 mg total) by mouth daily., Disp: 90 tablet, Rfl: 1     metoprolol tartrate (LOPRESSOR) 50 MG tablet, Take 1 tablet (50 mg total) by mouth 2 (two) times a day., Disp: 60 tablet, Rfl: 3     omeprazole (PRILOSEC) 20 MG capsule, Take 1 capsule (20 mg total) by mouth daily before breakfast., Disp: 14 capsule, Rfl: 0     rosuvastatin (CRESTOR) 40 MG tablet, Take 1 tablet (40 mg total) by mouth daily., Disp: 90 tablet, Rfl: 1     sodium phosphates 133 mL (FOR FLEET) 19-7 gram/118 mL Enem rectal enema, As needed, Disp: , Rfl: 0     traMADol (ULTRAM) 50 mg tablet, Take 1 tablet (50 mg total) by mouth every 6 (six) hours as needed., Disp: 12 tablet, Rfl: 0    ALLERGIES:      Allergies   Allergen Reactions     Amoxicillin Anaphylaxis and Swelling     Penicillins Anaphylaxis       ROS:  Gen: No fevers, weight loss/gain  CV: Denies chest pain, peripheral edema  Pulm: Denies shortness of breath  Ext: LUE weakness, pain, numbness  GI/: Voiding without problems, appetite improving.     LABS:  None    IMAGING:  None    PHYSICAL EXAM:   There were no vitals filed for this  visit.  General: Alert and oriented, pleasant, no acute distress.  CV:  No peripheral edema.  Pulm: Easy work of breathing on room air.   GI: Soft, non-tender, and non-distended  Incision: Chest and leg incisions clean dry and intact without erythema, swelling or drainage  Neuro: CNs grossly intact.  Ext: Diminished LUE  strength as compared to RUE.      ASSESSMENT/PLAN:  Eleuterio Camejo is a 63 y.o. year old male status post CABG who returns to clinic for postop visit.     - Surgically doing well.  Incisions are healing well with no signs of infection. Sternum is stable.  - Hemodynamics are stable. No medication changes were needed today.  - Metoprolol and Plavix sent to pharmacy.  - OT referral placed for LUE neuropathy in case patient wants to pursue although this is greatly improving as of recent.   - Follow up with your cardiologist as scheduled ([place] 4/12/2019)  - Continue Cardiac Rehab until completed.   - Continue sternal precautions for 2 more weeks. No lifting >10bs until April 3rd; may gradually increase at this point.   - May start driving at this point if not using narcotic pain medications.  - Plavix for one year; end date February 2020 (sent).  - No need for further follow-up with CV surgery unless concerns. Feel free to call our office with questions. [phone].         _______  Nahomy Cox PA-C  Cardiothoracic Surgery  [phone]

## 2021-05-27 NOTE — PROGRESS NOTES
Eleuterio Camejo has participated in 12 sessions of Phase II Cardiac Rehab.    Progress Report:   Cardiac Rehab Treatment Progress Report 3/28/2019 4/2/2019 4/4/2019 4/9/2019 4/11/2019   Weight 158 lbs 13 oz 160 lbs 10 oz 161 lbs 160 lbs 3 oz 160 lbs 10 oz   Pre Exercise  HR 80 82 78 85 77   Pre Exercise /74 118/70 116/68 110/60 108/74   Treadmill Peak  113 112 110 104   Treadmill Peak Blood Pressure 134/70 118/64 132/68 132/68 128/68   Nustep Peak Heart Rate - - - 94 91   Heart Rate 90 84 85 86 80   Post Exercise BP 88/64 102/74 108/64 100/68 110/60   ECG SR/ST with continued TWI in lead II SR with TWI Lead II SR with TWI lead II SR with TWI in lead II SR   Total Exercise Minutes 45 45 42 44 52         Current Status:  Currently exercising without complaints or symptoms.    If Physician recommends change in treatment plan, please place orders.        __________________________________________________      _____________  Signature                                                                                                  Date

## 2021-05-27 NOTE — TELEPHONE ENCOUNTER
----- Message from Mehran Lozada MD (Ted) sent at 4/15/2019  8:33 AM CDT -----  LDL continues to be significantly elevated in spite of maximal dose of Crestor and Zetia.  We should apply for Repatha approval

## 2021-05-27 NOTE — PATIENT INSTRUCTIONS - HE
- Surgically doing well.  Incisions are healing well with no signs of infection. Sternum is stable.  - Hemodynamics are stable. No medication changes were needed today.  - Metoprolol and Plavix sent to pharmacy.  - Follow up with your cardiologist as scheduled ([place] 4/12/2019)  - Continue Cardiac Rehab until completed.   - Continue sternal precautions for 2 more weeks. No lifting >10bs until April 3rd; may gradually increase at this point.   - May start driving at this point if not using narcotic pain medications.  - Plavix for one year; end date February 2020 (sent).  - No need for further follow-up with CV surgery unless concerns. Feel free to call our office with questions. [phone].

## 2021-05-27 NOTE — TELEPHONE ENCOUNTER
Received a fax from [employer] to refill Setia. This was done. -[facility] [place]

## 2021-05-27 NOTE — PROGRESS NOTES
ITP ASSESSMENT   Assessment Day: 30 Day    Session Number: 9  Precautions: Surgical    Diagnosis: MI;CAB (x4. 2/17/19, 2/20/19)    Risk Stratification: Medium    Referring Provider: Isaac Tenorio MD   ITP:Dr. Russell  EXERCISE  Exercise Assessment: Reassessment                                  Exercise Plan  Goals Next 30 days  ADL'S: Resume grocery shopping    Leisure: Resume using arms for activity without pain/symptoms    Work: Pt plans to return to work on 4/21/2019      Education Goals: All goals in this section met    Education Goals Met: Patient can state cardiac s/s and appropriate emergency response.;Has system for taking medication.;Medication review.                          Goals Met  Initial ADL's goals met: Pt has resumed driving without difficulty    Initial Leisure goals met: Pt is tolerating light to moderate housework, including dishes, laundry    Intial Work goals met: Pt has not returned to work yet    Initial Progression: Pt pleased with progress, states he is back to doing most activities at home that don't require lifting.      Exercise Prescription  Exercise Mode: Treadmill;Bike;Nustep;Hallway Walking;Stairs    Frequency: 2x/week    Duration: 40-45 min    Intensity / THR: 20-30 beats above resting heart rate    RPE 11-14  Progression / Met level: 4.4-6    Resistive Training?: Yes      Current Exercise (mins/week): 190      Interventions  Home Exercise:  Mode: stationary bike    Frequency: 4-5x/week    Duration: 20-30 min      Education Material : Educational videos;Provide written material;Individual education and counseling;Offer educational classes      Education Completed  Exercise Education Completed: Cardiac Anatomy;Signs and Symptoms;Medication review;RPE;Emergency Plan;Home Exercise;Warm up/cool down;FITT Principles;BP/HR Reponse to exercise;Stretching;Strength training;Benefits of Exercise;End point of exercise              Exercise Follow-up/Discharge  Follow up/Discharge: Pt  "has reached 4.3 MET level, pt reports he has been riding his bike 15 min at home, encouraged pt to increase duration to 20-30 min and include warmup and cooldown           NUTRITION  Nutrition Assessment: Reassessment      Nutrition Risk Factors:  Nutrition Risk Factors: Dyslipidemia;Overweight      Nutrition Plan  Interventions  Diet Consult: Declined    Other Nutrition Intervention: Diet Class;Therapist/Pt Discussion;Provide with Written Material    Initial Rate Your Plate Score: 57      Education Completed  Nutrition Education Completed: Low Saturated fat diet;Risk factor overview;Low sodium diet      Goals  Nutrition Goals (Next 30 days): Patient demonstrated understanding of cardiac nutrition, no goals identified for the next 30 days      Goals Met  Nutrition Goals Met: Patient follows a low sodium diet;Patient states following a low saturated fat diet;Patient knows appropriate portion size      Height, Weight, and  BMI  Weight: 160 lb 9.6 oz (72.8 kg)  Height: 5' 6\" (1.676 m)  BMI: 25.93      Nutrition Follow-up  Follow-up/Discharge: Pt's daughters have set up for their dad to get meals on a daily basis through Facebook group/[employer]-which is set up to gear towards heart healthy meals: low salt and low fat. Pt has known CAD and stents in 2003-Pt will continue to follow low fat and low salt heart healthy diet. Pt would like to lose about 15lbs.        Other Risk Factors  Other Risk Factor Assessment: Reassessment      HTN Risk Factor: Hypertension      Pre Exercise BP: 118/70        Hypertension Plan  Goals  HTN Goals: Patient demonstrates understanding of HTN, no goals identified for the next 30 days      Goals Met  HTN Goals Met: Follow low sodium diet;Take medication as prescribed;Exercises regularly      HTN Interventions  HTN Interventions: Diet consult;Therapist/patient discussion;Offer educational classes;Provide written material      HTN Education Completed  HTN Education Completed: Low sodium " diet;Medication review;Risk factor overview      Tobacco Risk Factor: NA               PSYCHOSOCIAL  Psychosocial Assessment: Reassessment           Psychosocial Risk Factor: NA      Psychosocial Plan  Interventions  Interventions: Individual education and counseling;Provide written material;Offer educational videos and classes      Education Completed  Education Completed: Relaxation/Coping Techniques;S/S of depression;Effects of stress on body      Goals  Goals (Next 30 days): Patient demonstrates understanding of stress, no goals identified for the next 30 days      Goals Met  Goals Met: Identified Support system;Oriented to stress management classes;Identify stressors;Practicing stress management skills      Psychosocial Follow-up  Follow-up/Discharge: Pt feels that his stressors are manageable at this time. He reports a great support system with 4 kids, family and friends and [place] community. He enjoys relaxing by keeping active with skiing, snowboarding, skate boarding and also bikes/cycles outside when weather permits.            Patient involved in Goal setting?: Yes      Signature: _____________________________________________________________    Date: __________________    Time: __________________

## 2021-05-28 ENCOUNTER — RECORDS - HEALTHEAST (OUTPATIENT)
Dept: ADMINISTRATIVE | Facility: CLINIC | Age: 66
End: 2021-05-28

## 2021-05-28 NOTE — PROGRESS NOTES
ITP ASSESSMENT   Assessment Day: 60 Day    Session Number: 15  Precautions: Surgical, Left pinched n. in arm-starting to get better    Diagnosis: MI;CAB (x4. 2/17/19, 2/20/19)    Risk Stratification: Medium    Referring Provider: Isaac Tenorio MD   ITP: Dr. Russell  EXERCISE  Exercise Assessment: Reassessment                       Exercise Plan  Goals Next 30 days  ADL'S: Increase METs to mock higher level of activities: painting, carpentry 4.5-9    Leisure: Lift weights/use arm erg/arm on Nustep to increase upper extremity strength and stamina    Work: Restrictions until May 29th. Goal is to get to 30-40lbs.       Education Goals: All goals in this section met    Education Goals Met: Patient can state cardiac s/s and appropriate emergency response.;Has system for taking medication.;Medication review.                          Goals Met  30 day ADL'S goals met: Goal met: pt has resumed grocery shopping.     30 day Leisure goals met: Goal not met: pt is using arms on Nustep and gets muscular soreness and left pinched n. in arm    30 day Work goals met: Pt has returned to work but is on restrictions. COntinue to build strength and stamina. He walks alot and tolerates well. Goal is 30-40lbs in future.     30 Day Progression: Pt has reached a 5.1 MET on TM      Initial ADL's goals met: Pt has resumed driving without difficulty    Initial Leisure goals met: Pt is tolerating light to moderate housework, including dishes, laundry    Intial Work goals met: Pt has not returned to work yet    Initial Progression: Pt pleased with progress, states he is back to doing most activities at home that don't require lifting.      Exercise Prescription  Exercise Mode: Treadmill;Bike;Nustep;Hallway Walking;Stairs    Frequency: 1x/week (Pt had to reduce attending CR to 1x/week due to work)    Duration: 30-50min    Intensity / THR: 20-30 beats above resting heart rate    RPE 11-14  Progression / Met level: 5-7+    Resistive Training?:  "Yes      Current Exercise (mins/week): 210      Interventions  Home Exercise:  Mode: Walking, Biking-stationary    Frequency: 4-5x/week    Duration: 30-45minutes      Education Material : Educational videos;Provide written material;Individual education and counseling;Offer educational classes      Education Completed  Exercise Education Completed: Cardiac Anatomy;Signs and Symptoms;Medication review;RPE;Emergency Plan;Home Exercise;Warm up/cool down;FITT Principles;BP/HR Reponse to exercise;Stretching;Strength training;Benefits of Exercise;End point of exercise              Exercise Follow-up/Discharge  Follow up/Discharge: Continue to progress to LTG 7-9METs and work on UE strength training/stamina   NUTRITION  Nutrition Assessment: Reassessment    Nutrition Risk Factors:  Nutrition Risk Factors: Dyslipidemia;Overweight  Cholesterol: Lab draw due 6/23/18  LDL: 128 Direct LDL on 4/12/18  HDL: ?  Triglycerides: ?      Nutrition Plan  Interventions  Diet Consult: Declined    Other Nutrition Intervention: Diet Class;Therapist/Pt Discussion;Provide with Written Material    Initial Rate Your Plate Score: 57    Follow-Up Rate Your Plate Score: 63    Education Completed  Nutrition Education Completed: Low Saturated fat diet;Risk factor overview;Low sodium diet    Goals  Nutrition Goals (Next 30 days): Patient demonstrated understanding of cardiac nutrition, no goals identified for the next 30 days      Goals Met  Nutrition Goals Met: Patient follows a low sodium diet;Patient states following a low saturated fat diet;Patient knows appropriate portion size;Reviewed Dietitian schedule;Provided Rate your Plate Survey;Completed Nutritional Risk Screen;Patient can identify their risk factors for CAD      Height, Weight, and  BMI  Weight: 162 lb (73.5 kg)  Height: 5' 6\" (1.676 m)  BMI: 26.16      Nutrition Follow-up  Follow-up/Discharge: Pt is comfortable with diet. He is eating heart healthy low fat and low salt. Pt feels " comfortable with diet and has declined 1:1 consult because he has met with them in the past         Other Risk Factors  Other Risk Factor Assessment: Reassessment      HTN Risk Factor: Hypertension      Pre Exercise BP: 118/70  Post Exercise BP: 114/60      Hypertension Plan  Goals  HTN Goals: Patient demonstrates understanding of HTN, no goals identified for the next 30 days      Goals Met  HTN Goals Met: Follow low sodium diet;Take medication as prescribed;Exercises regularly      HTN Interventions  HTN Interventions: Diet consult;Therapist/patient discussion;Offer educational classes;Provide written material      HTN Education Completed  HTN Education Completed: Low sodium diet;Medication review;Risk factor overview      Tobacco Risk Factor: NA      Risk Factor Follow-up   Follow-up/Discharge: CRF: Family Hx, HTN, HLD and slightly overweight. Pt overall eats heart healthy, walking daily and takes medications as prescribed to manage CRF.     PSYCHOSOCIAL  Psychosocial Assessment: Reassessment       [facility] Q of L Summary Score: 22      NASREEN-D Score: 4      Psychosocial Risk Factor: NA      Psychosocial Plan  Interventions  Interventions: Individual education and counseling;Provide written material;Offer educational videos and classes      Education Completed  Education Completed: Relaxation/Coping Techniques;S/S of depression;Effects of stress on body    Goals  Goals (Next 30 days): Patient demonstrates understanding of stress, no goals identified for the next 30 days    Goals Met  Goals Met: Identified Support system;Oriented to stress management classes;Identify stressors;Practicing stress management skills    Psychosocial Follow-up  Follow-up/Discharge: Pt enjoys building car models. Pt feels that his stressors are manageable at this time. He reports a great support system with 4 kids, family and friends and [employer] community. He enjoys relaxing by keeping active with skiing, snowboarding, skate boarding  and also bikes/cycles outside when weather permits.              Patient involved in Goal setting?: Yes

## 2021-05-28 NOTE — TELEPHONE ENCOUNTER
Called and updated patient that order is there. He will have checked in 2 months. Msg sent to scheduling to create recall. -[facility] [place]

## 2021-05-28 NOTE — TELEPHONE ENCOUNTER
PA completed via [facility] [employer] and fax. Copy of paperwork sent to [facility] to scan to chart. -[facility]  Patient updated.       Addendum:  Received approval of medication- patient updated. Rx already sent to [facility] per [facility] in [place]. [facility] clinic already has the prescription and is preparing for delivery and will contact the patient. -[facility]        Elissa Comer is approved through April 2020. When would you like lipids rechecked?  Thanks,  David

## 2021-05-29 NOTE — PROGRESS NOTES
Chief Complaint   Patient presents with     Letter for School/Work     Return to work letter and lifting restrictions.         HPI: Patient presents today to get restrictions listed from him for work.  Since his bypass for his NSTEMI, he has slowly been recovering and going to cardiac rehab.  A review of those notes shows the patient has been improving as anticipated.  When I last saw him he was complaining of some left shoulder pain with radicular symptoms down the left hand.  He says that within the last week that has completely resolved.  He is able to go about activities of daily living without any dyspnea, fatigue, or weakness.  He has been lifting heavier items around the house without issues.  His scar seems to be healing up well without any signs or symptoms of infection or dehiscence.  He followed up with cardiology on 4/12/2019 and continues to do well.    Blood pressure initially slightly elevated today.  Continues to take his antihypertensives as prescribed.  Asymptomatic.  On recheck it is improved.  A review of records from his cardiac rehab appointment show that he has been under okay control.    ROS:Review of Systems - History obtained from the patient  General ROS: negative  Ophthalmic ROS: negative  ENT ROS: negative  Respiratory ROS: negative  Cardiovascular ROS: negative  Musculoskeletal ROS: negative  Neurological ROS: negative  Dermatological ROS: negative    SH: The Patient's  reports that he has never smoked. He has never used smokeless tobacco. He reports that he does not drink alcohol or use drugs.      FH: The Patient's family history includes Heart attack (age of onset: 42) in his father; Heart disease in his paternal grandfather; Hyperlipidemia in his sister.     Meds:    Current Outpatient Medications on File Prior to Visit   Medication Sig Dispense Refill     acetaminophen (TYLENOL) 325 MG tablet Take 2 tablets (650 mg total) by mouth every 6 (six) hours as needed for pain.  0      "aspirin 81 mg chewable tablet Chew 81 mg daily.       clopidogrel (PLAVIX) 75 mg tablet Take 1 tablet (75 mg total) by mouth daily. 30 tablet 10     evolocumab 140 mg/mL PnIj Inject 140 mg under the skin every 14 (fourteen) days. 2 Syringe 11     ezetimibe (ZETIA) 10 mg tablet Take 1 tablet (10 mg total) by mouth daily. 90 tablet 1     lisinopril (PRINIVIL,ZESTRIL) 10 MG tablet TAKE 1 TABLET DAILY 90 tablet 1     metoprolol tartrate (LOPRESSOR) 50 MG tablet Take 1 tablet (50 mg total) by mouth 2 (two) times a day. 60 tablet 3     rosuvastatin (CRESTOR) 40 MG tablet TAKE 1 TABLET DAILY 90 tablet 1     [DISCONTINUED] sodium phosphates 133 mL (FOR FLEET) 19-7 gram/118 mL Enem rectal enema As needed  0     No current facility-administered medications on file prior to visit.        O:  /86   Pulse 76   Temp 98.3  F (36.8  C) (Oral)   Ht 5' 6.25\" (1.683 m)   Wt 164 lb (74.4 kg)   SpO2 97%   BMI 26.27 kg/m      Physical Examination:   General appearance - alert, well appearing, and in no distress  Mental status - alert, oriented to person, place, and time  Neck - supple, no significant adenopathy  Lymphatics - no palpable lymphadenopathy, no hepatosplenomegaly  Chest - clear to auscultation, no wheezes, rales or rhonchi, symmetric air entry  Heart - normal rate and regular rhythm, S1 and S2 normal, no murmurs noted  Neurological - alert, oriented, normal speech, no focal findings or movement disorder noted, neck supple without rigidity, cranial nerves II through XII intact, motor and sensory grossly normal bilaterally, normal muscle tone, no tremors, strength 5/5  Musculoskeletal - no joint tenderness, deformity or swelling.  Full range of motion in both upper extremities.  Strength 5/5.  Extremities - peripheral pulses normal, no pedal edema, no clubbing or cyanosis  Skin - normal coloration and turgor, no rashes, no suspicious skin lesions noted      A/P:     Problem List Items Addressed This Visit     " History of non-ST elevation myocardial infarction (NSTEMI)      Other Visit Diagnoses     Screen for colon cancer    -  Primary    Relevant Orders    Ambulatory referral for Colonoscopy            1. History of non-ST elevation myocardial infarction (NSTEMI)  Doing well.  Appears to be fully recovered and at optimal health.  Restrictions lifted and a letter was provided to him to give to human resources.  Continue to monitor blood pressure and follow-up if rising.  Planned follow-up with cardiology in a couple of months.    2. Screen for colon cancer  - Ambulatory referral for Colonoscopy        Shawn Lee, CNP

## 2021-05-29 NOTE — TELEPHONE ENCOUNTER
Hospital staff put him on restrictions at work through 5/20/2019. Pt would like those restrictions to be lifted. He can't get in with cardiology until July. He is request Shawn lift these restrictions. H.DeGree, CMA

## 2021-05-29 NOTE — PROGRESS NOTES
ITP ASSESSMENT   Assessment Day: 90 Day    Session Number: 17  Precautions: Surgical, left pinchd nerve in arm    Diagnosis: MI;CAB (x4. 2/17/19, 2/20/19)    Risk Stratification: Medium    Referring Provider: Shawn Lee CNP  EXERCISE  Exercise Assessment: Reassessment                           Exercise Plan  Goals Next 30 days  ADL'S: Continue to increase METs to mock higher level of activities;painting and carpentry 4.5-9.0    Leisure: Continue to lift weights/use arm ergometer to increase upper extremity strength and stamina.    Work: Be able to lift 30-40 pounds and tolerate this well.      Education Goals: All goals in this section met    Education Goals Met: Patient can state cardiac s/s and appropriate emergency response.;Has system for taking medication.;Medication review.                          Goals Met  60 day ADL'S goals met: Goal partially met. Patient has reached a MET level of 5.1 on the treadmill.    60 day Leisure goals met: Goal partially met. Patient has noticed an increase in upper extremity strength and endurance. Patient would still like to continue to increase upper extremity strength.    60 day Work goals met: Patient has been lifting up to 20 pounds and is tolerating this well.    60 Day Progression: Patient is making good progress towards his stated goals.      30 day ADL'S goals met: Goal met: pt has resumed grocery shopping.     30 day Leisure goals met: Goal not met: pt is using arms on Nustep and gets muscular soreness and left pinched n. in arm    30 day Work goals met: Pt has returned to work but is on restrictions. COntinue to build strength and stamina. He walks alot and tolerates well. Goal is 30-40lbs in future.     30 Day Progression: Pt has reached a 5.1 MET on TM      Initial ADL's goals met: Pt has resumed driving without difficulty    Initial Leisure goals met: Pt is tolerating light to moderate housework, including dishes, laundry    Intial Work goals met: Pt has not  returned to work yet    Initial Progression: Pt pleased with progress, states he is back to doing most activities at home that don't require lifting.      Exercise Prescription  Exercise Mode: Treadmill;Bike;Nustep;Hallway Walking;Stairs    Frequency: 1x/week    Duration: 30-50 minutes    Intensity / THR: 20-30 beats above resting heart rate    RPE 11-14  Progression / Met level: 5-7+    Resistive Training?: Yes      Current Exercise (mins/week): 380      Interventions  Home Exercise:  Mode: walking, biking    Frequency: 5x/week    Duration: 30-40 minutes      Education Material : Educational videos;Provide written material;Individual education and counseling;Offer educational classes      Education Completed  Exercise Education Completed: Cardiac Anatomy;Signs and Symptoms;Medication review;RPE;Emergency Plan;Home Exercise;Warm up/cool down;FITT Principles;BP/HR Reponse to exercise;Stretching;Strength training;Benefits of Exercise;End point of exercise              Exercise Follow-up/Discharge  Follow up/Discharge: Patient has reached a MET level of 5.1 on the treadmill. Continue to progress to LTG of 7-9.   NUTRITION  Nutrition Assessment: Reassessment      Nutrition Risk Factors:  Nutrition Risk Factors: Dyslipidemia;Overweight  Cholesterol: Lab draw due 6/23/18  LDL: 128 Direct LDL on 4/12/18  HDL: ?  Triglycerides: ?      Nutrition Plan  Interventions  Diet Consult: Declined    Other Nutrition Intervention: Diet Class;Therapist/Pt Discussion    Initial Rate Your Plate Score: 57    Follow-Up Rate Your Plate Score: 63      Education Completed  Nutrition Education Completed: Low Saturated fat diet;Risk factor overview;Low sodium diet      Goals  Nutrition Goals (Next 30 days): Patient demonstrated understanding of cardiac nutrition, no goals identified for the next 30 days      Goals Met  Nutrition Goals Met: Patient follows a low sodium diet;Patient states following a low saturated fat diet;Patient knows  "appropriate portion size;Reviewed Dietitian schedule;Provided Rate your Plate Survey;Completed Nutritional Risk Screen;Patient can identify their risk factors for CAD      Height, Weight, and  BMI  Weight: 164 lb (74.4 kg)  Height: 5' 6\" (1.676 m)  BMI: 26.48      Nutrition Follow-up  Follow-up/Discharge: Pt is comfortable with diet. He is eating heart healthy low fat and low salt. Pt feels comfortable with diet and has declined 1:1 consult because he has met with them in the past         Other Risk Factors  Other Risk Factor Assessment: Reassessment      HTN Risk Factor: Hypertension      Pre Exercise BP: 102/70  Post Exercise BP: 118/70      Hypertension Plan  Goals  HTN Goals: Patient demonstrates understanding of HTN, no goals identified for the next 30 days      Goals Met  HTN Goals Met: Follow low sodium diet;Take medication as prescribed;Exercises regularly      HTN Interventions  HTN Interventions: Diet consult;Therapist/patient discussion;Offer educational classes;Provide written material      HTN Education Completed  HTN Education Completed: Low sodium diet;Medication review;Risk factor overview      Tobacco Risk Factor: NA          Risk Factor Follow-up   Follow-up/Discharge: Resting and exercise BP WNLs     PSYCHOSOCIAL  Psychosocial Assessment: Reassessment       [facility] Q of L Summary Score: 22      NASREEN-D Score: 4      Psychosocial Risk Factor: NA      Psychosocial Plan  Interventions  Interventions: Individual education and counseling;Provide written material;Offer educational videos and classes      Education Completed  Education Completed: Relaxation/Coping Techniques;S/S of depression;Effects of stress on body      Goals  Goals (Next 30 days): Patient demonstrates understanding of stress, no goals identified for the next 30 days      Goals Met  Goals Met: Identified Support system;Oriented to stress management classes;Identify stressors;Practicing stress management skills      Psychosocial " Follow-up  Follow-up/Discharge: Patient reports stress is under control. Patient enjoys building models and skatboarding.             Patient involved in Goal setting?: Yes      Signature: _____________________________________________________________    Date: __________________    Time: __________________

## 2021-05-29 NOTE — PROGRESS NOTES
ITP ASSESSMENT   Assessment Day: 120 Day    Session Number: 20  Precautions: Surgical, left pinchd nerve in arm    Diagnosis: MI;CAB (x4. 2/17/19, 2/20/19)    Risk Stratification: Medium    Referring Provider: Shawn Lee CNP   ITP sent to Dr. Russell  EXERCISE  Exercise Assessment: Reassessment                         Exercise Plan  Goals Next 30 days  ADL'S: Continue to increase METs to mock higher level of activities; Painting and Carpentry 9.0    Leisure: Continue to lift weights/use arm ergometer to increase upper extremity strength and stamina.    Work: Pt is independent in all work duties       Education Goals: All goals in this section met    Education Goals Met: Patient can state cardiac s/s and appropriate emergency response.;Has system for taking medication.;Medication review.                          Goals Met  90 day ADL'S goals met: Goal partially met: Pt continues to increase MET level. Pt currently on 5.6 METs in TM    90 day Leisure goals met: Goal partially met: Pt continues to work on upper body strength    90 day Work goals met: Goal met: Pt is able to lift 30-40lb without any difficulty    90 Day Progress: Pt has reached 5.6 on TM       60 day ADL'S goals met: Goal partially met. Patient has reached a MET level of 5.1 on the treadmill.    60 day Leisure goals met: Goal partially met. Patient has noticed an increase in upper extremity strength and endurance. Patient would still like to continue to increase upper extremity strength.    60 day Work goals met: Patient has been lifting up to 20 pounds and is tolerating this well.    60 Day Progression: Patient is making good progress towards his stated goals.      30 day ADL'S goals met: Goal met: pt has resumed grocery shopping.     30 day Leisure goals met: Goal not met: pt is using arms on Nustep and gets muscular soreness and left pinched n. in arm    30 day Work goals met: Pt has returned to work but is on restrictions. COntinue to build  strength and stamina. He walks alot and tolerates well. Goal is 30-40lbs in future.     30 Day Progression: Pt has reached a 5.1 MET on TM      Initial ADL's goals met: Pt has resumed driving without difficulty    Initial Leisure goals met: Pt is tolerating light to moderate housework, including dishes, laundry    Intial Work goals met: Pt has not returned to work yet    Initial Progression: Pt pleased with progress, states he is back to doing most activities at home that don't require lifting.      Exercise Prescription  Exercise Mode: Treadmill;Bike;Nustep;Hallway Walking;Stairs    Frequency: 1x/week    Duration: 30-50 minutes    Intensity / THR: 20-30 beats above resting heart rate    RPE 11-14  Progression / Met level: 5.6-7+    Resistive Training?: Yes      Current Exercise (mins/week): 225      Interventions  Home Exercise:  Mode: walking, biking    Frequency: 5x/week    Duration: 40-50 minutes      Education Material : Educational videos;Provide written material;Individual education and counseling;Offer educational classes      Education Completed  Exercise Education Completed: Cardiac Anatomy;Signs and Symptoms;Medication review;RPE;Emergency Plan;Home Exercise;Warm up/cool down;FITT Principles;BP/HR Reponse to exercise;Stretching;Strength training;Benefits of Exercise;End point of exercise              Exercise Follow-up/Discharge  Follow up/Discharge: Pt has reached 5.6 on TM. Continuue to progress to 7-9 METS   NUTRITION  Nutrition Assessment: Reassessment      Nutrition Risk Factors:  Nutrition Risk Factors: Dyslipidemia;Overweight  Cholesterol: Lab draw due 6/23/18  LDL: 128 Direct LDL on 4/12/18  HDL: ?  Triglycerides: ?      Nutrition Plan  Interventions  Diet Consult: Declined    Other Nutrition Intervention: Diet Class;Therapist/Pt Discussion    Initial Rate Your Plate Score: 57    Follow-Up Rate Your Plate Score: 63      Education Completed  Nutrition Education Completed: Low Saturated fat  "diet;Risk factor overview;Low sodium diet      Goals  Nutrition Goals (Next 30 days): Patient demonstrated understanding of cardiac nutrition, no goals identified for the next 30 days      Goals Met  Nutrition Goals Met: Patient follows a low sodium diet;Patient states following a low saturated fat diet;Patient knows appropriate portion size;Reviewed Dietitian schedule;Provided Rate your Plate Survey;Completed Nutritional Risk Screen;Patient can identify their risk factors for CAD      Height, Weight, and  BMI  Weight: 164 lb (74.4 kg)  Height: 5' 6\" (1.676 m)  BMI: 26.48      Nutrition Follow-up  Follow-up/Discharge: Pt is comfortable with diet. He is eating heart healthy low fat and low salt. Pt feels comfortable with diet and has declined 1:1 consult because he has met with them in the past         Other Risk Factors  Other Risk Factor Assessment: Reassessment      HTN Risk Factor: Hypertension      Pre Exercise BP: 108/70  Post Exercise BP: 110/70      Hypertension Plan  Goals  HTN Goals: Patient demonstrates understanding of HTN, no goals identified for the next 30 days      Goals Met  HTN Goals Met: Follow low sodium diet;Take medication as prescribed;Exercises regularly      HTN Interventions  HTN Interventions: Diet consult;Therapist/patient discussion;Offer educational classes;Provide written material      HTN Education Completed  HTN Education Completed: Low sodium diet;Medication review;Risk factor overview      Tobacco Risk Factor: NA    Risk Factor Follow-up   Follow-up/Discharge: Resting and exercise BP WNLs     PSYCHOSOCIAL  Psychosocial Assessment: Reassessment       [facility] Q of L Summary Score: 22      No data recorded    Psychosocial Risk Factor: NA      Psychosocial Plan  Interventions  Interventions: Individual education and counseling;Provide written material;Offer educational videos and classes      Education Completed  Education Completed: Relaxation/Coping Techniques;S/S of " depression;Effects of stress on body      Goals  Goals (Next 30 days): Patient demonstrates understanding of stress, no goals identified for the next 30 days      Goals Met  Goals Met: Identified Support system;Oriented to stress management classes;Identify stressors;Practicing stress management skills      Psychosocial Follow-up  Follow-up/Discharge: Pt enjoys bike riding, skate boarding and being with his grandchildren.              Patient involved in Goal setting?: Yes      Signature: _____________________________________________________________    Date: __________________    Time: __________________

## 2021-05-29 NOTE — TELEPHONE ENCOUNTER
Have to re-assess to show evidence that these restrictions can be lifted. Please help schedule and we can get this done ASAP.    Shawn Lee, CNP

## 2021-05-29 NOTE — TELEPHONE ENCOUNTER
Who is calling:  Patient   Reason for Call:  Patient needs a not from Shawn Lee stating he is cleared for work and able to return to work patient would like a call back.  Date of last appointment with primary care: 03/01/19  Okay to leave a detailed message: Yes

## 2021-05-30 VITALS — HEIGHT: 66 IN | WEIGHT: 161 LBS | BODY MASS INDEX: 25.88 KG/M2

## 2021-05-30 NOTE — PROGRESS NOTES
"Cardiology Progress Note    Assessment:  Coronary artery disease status post recent non-ST segment elevation myocardial infarction and coronary artery bypass graft surgery(LIMA to LAD and vein grafts to diagonal branch, obtuse marginal 3 and RPDA) in February 2019, stable, no angina  Hypercholesterolemia on maximal dose of Crestor, Zetia and Repatha  Hypertension, good control      Plan:  Lipid profile    Continue current medications; dual antiplatelet agents at least 2 February 2019    Follow-up in 6 months    Subjective:   This is 63 y.o. male who comes in today for follow-up visit.  He has done well.  He just completed cardiac rehab program.  He has no chest pain or shortness of breath he denies heart palpitations.  He tolerates Repatha well    Review of Systems:   General: WNL  Eyes: WNL  Ears/Nose/Throat: WNL  Lungs: WNL  Heart: WNL  Stomach: WNL  Bladder: WNL  Muscle/Joints: WNL  Skin: WNL  Nervous System: WNL  Mental Health: WNL     Blood: WNL    Objective:   /88 (Patient Site: Right Arm, Patient Position: Sitting, Cuff Size: Adult Regular)   Pulse 64   Resp 16   Ht 5' 6.25\" (1.683 m)   Wt 166 lb (75.3 kg)   BMI 26.59 kg/m    Physical Exam:  GENERAL: no distress  NECK: No JVD  LUNGS: Clear to auscultation.  CARDIAC: regular rhythm, S1 & S2 normal.  No heaves, thrills, gallops or murmurs.  ABDOMEN: flat, negative hepatosplenomegaly, soft and non-tender.  EXTREMITIES: No evidence of cyanosis, clubbing or edema.    Current Outpatient Medications   Medication Sig Dispense Refill     aspirin 81 mg chewable tablet Chew 81 mg daily.       clopidogrel (PLAVIX) 75 mg tablet TAKE 1 TABLET BY MOUTH EVERY DAY 90 tablet 0     evolocumab 140 mg/mL PnIj Inject 140 mg under the skin every 14 (fourteen) days. 2 Syringe 11     ezetimibe (ZETIA) 10 mg tablet Take 1 tablet (10 mg total) by mouth daily. 90 tablet 1     metoprolol tartrate (LOPRESSOR) 50 MG tablet TAKE 1 TABLET BY MOUTH TWICE A  tablet 2     " rosuvastatin (CRESTOR) 40 MG tablet TAKE 1 TABLET DAILY 90 tablet 1     acetaminophen (TYLENOL) 325 MG tablet Take 2 tablets (650 mg total) by mouth every 6 (six) hours as needed for pain.  0     lisinopril (PRINIVIL,ZESTRIL) 10 MG tablet TAKE 1 TABLET DAILY 90 tablet 1     metoprolol tartrate (LOPRESSOR) 50 MG tablet TAKE 1 TABLET BY MOUTH TWICE A DAY 60 tablet 0     No current facility-administered medications for this visit.        Cardiographics:    Echocardiogram: February 2019    Left ventricle ejection fraction is normal. The calculated left ventricular ejection fraction is 66%.    Normal left ventricular size and systolic function.    Normal right ventricular size and systolic function.    Mild mitral regurgitation        Coronary angio: February 2019  LM normal  LAD mod to severe dz in prox LAD; severe dz in mid with JANEL 2 flow into LAD with collaterals via RCA; first diagonal stent 100%  with collaterals  Circ OM3 small 100% with collaterals  RCA mild to mod diffuse dz with stent in %  with collaterals via LAD     LVEDP 11mmHg    Lab Results:       Lab Results   Component Value Date    CHOL 141 02/18/2019    CHOL 186 03/30/2018    CHOL 135 01/23/2017     Lab Results   Component Value Date    HDL 32 (L) 02/18/2019    HDL 39 (L) 03/30/2018    HDL 31 (L) 01/23/2017     Lab Results   Component Value Date    LDLCALC 85 02/18/2019    LDLCALC 105 03/30/2018    LDLCALC 82 01/23/2017     Lab Results   Component Value Date    TRIG 118 02/18/2019    TRIG 212 (H) 03/30/2018    TRIG 110 01/23/2017     No results found for: BNP    Mehran (Michael)  MD Neville

## 2021-05-30 NOTE — PATIENT INSTRUCTIONS - HE
Eleuterio Camejo,    It was a pleasure to see you today at the [facility].     My recommendations after this visit include:    lipids    CHANDAN Lozada MD, FACC, KAITLYN

## 2021-05-30 NOTE — PROGRESS NOTES
ITP ASSESSMENT   Assessment Day: 150 Day    Session Number: 21  Precautions: Surgical, left pinchd nerve in arm    Diagnosis: MI;CAB (x4. 2/17/19, 2/20/19)    Risk Stratification: Medium    Referring Provider: Shawn Lee CNP   ITP:Dr. Russell  EXERCISE  Exercise Assessment: Reassessment                            Exercise Plan  Goals Next 30 days    ADL'S: Goals met    Leisure: Goals met    Work: Pt is independent in all work duties      Pt plans to be done with cardiac rehab after next session    Education Goals: All goals in this section met    Education Goals Met: Patient can state cardiac s/s and appropriate emergency response.;Has system for taking medication.;Medication review.                          Goals Met                                    120 day ADL'S goals met: Pt has increased MET level to 5.9-goal met    120 day Leisure goals met: Pt has been lifting wts consistently-goal met    120 day Work goals met: Goal met    120 Day Progress: Pt has progressed tp 5.9 METS< requesting to be done next session      90 day ADL'S goals met: Goal partially met: Pt continues to increase MET level. Pt currently on 5.6 METs in TM    90 day Leisure goals met: Goal partially met: Pt continues to work on upper body strength    90 day Work goals met: Goal met: Pt is able to lift 30-40lb without any difficulty    90 Day Progress: Pt has reached 5.6 on TM       60 day ADL'S goals met: Goal partially met. Patient has reached a MET level of 5.1 on the treadmill.    60 day Leisure goals met: Goal partially met. Patient has noticed an increase in upper extremity strength and endurance. Patient would still like to continue to increase upper extremity strength.    60 day Work goals met: Patient has been lifting up to 20 pounds and is tolerating this well.    60 Day Progression: Patient is making good progress towards his stated goals.      30 day ADL'S goals met: Goal met: pt has resumed grocery shopping.     30 day Leisure  goals met: Goal not met: pt is using arms on Nustep and gets muscular soreness and left pinched n. in arm    30 day Work goals met: Pt has returned to work but is on restrictions. COntinue to build strength and stamina. He walks alot and tolerates well. Goal is 30-40lbs in future.     30 Day Progression: Pt has reached a 5.1 MET on TM        Exercise Prescription  Exercise Mode: Treadmill;Bike;Nustep;Hallway Walking;Stairs    Frequency: 1x/wk    Duration: 40 min    Intensity / THR: 20-30 beats above resting heart rate    RPE 11-14  Progression / Met level: 5.6-7+    Resistive Training?: Yes      Current Exercise (mins/week): 305      Interventions  Home Exercise:  Mode: walking, biking    Frequency: 5x/week    Duration: 40-50 min      Education Material : Educational videos;Provide written material;Individual education and counseling;Offer educational classes      Education Completed  Exercise Education Completed: Cardiac Anatomy;Signs and Symptoms;Medication review;RPE;Emergency Plan;Home Exercise;Warm up/cool down;FITT Principles;BP/HR Reponse to exercise;Stretching;Strength training;Benefits of Exercise;End point of exercise              Exercise Follow-up/Discharge  Follow up/Discharge: Pt has reached 5.9METS   NUTRITION  Nutrition Assessment: Reassessment      Nutrition Risk Factors:  Nutrition Risk Factors: Dyslipidemia;Overweight  Cholesterol: Lab draw due 6/23/18  LDL: 128 Direct LDL on 4/12/18  HDL: ?  Triglycerides: ?      Nutrition Plan  Interventions  Diet Consult: Declined    Other Nutrition Intervention: Diet Class;Therapist/Pt Discussion;Provide with Written Material    Initial Rate Your Plate Score: 57    Pre     Follow-Up Rate Your Plate Score: 63      Education Completed  Nutrition Education Completed: Low Saturated fat diet;Risk factor overview;Low sodium diet      Goals  Nutrition Goals (Next 30 days): Patient demonstrated understanding of cardiac nutrition, no goals identified for the next 30  "days      Goals Met  Nutrition Goals Met: Patient follows a low sodium diet;Patient states following a low saturated fat diet;Patient knows appropriate portion size;Reviewed Dietitian schedule;Provided Rate your Plate Survey;Completed Nutritional Risk Screen;Patient can identify their risk factors for CAD      Height, Weight, and  BMI  Weight: 165 lb (74.8 kg)  Height: 5' 6\" (1.676 m)  BMI: 26.64    Pre BMI: 26.64     Nutrition Follow-up  Follow-up/Discharge: Pt is comfortable with diet. He is eating heart healthy low fat and low salt. Pt feels comfortable with diet and has declined 1:1 consult because he has met with them in the past         Other Risk Factors  Other Risk Factor Assessment: Reassessment      HTN Risk Factor: Hypertension      Pre Exercise BP: 130/86  Post Exercise BP: 114/78      Hypertension Plan  Goals  HTN Goals: Patient demonstrates understanding of HTN, no goals identified for the next 30 days      Goals Met  HTN Goals Met: Follow low sodium diet;Take medication as prescribed;Exercises regularly      HTN Interventions  HTN Interventions: Diet consult;Therapist/patient discussion;Offer educational classes;Provide written material      HTN Education Completed  HTN Education Completed: Low sodium diet;Medication review;Risk factor overview      Tobacco Risk Factor: NA           PSYCHOSOCIAL  Psychosocial Assessment: Reassessment       Psychosocial Risk Factor: NA      Psychosocial Plan  Interventions    Interventions: Individual education and counseling;Provide written material;Offer educational videos and classes      Education Completed  Education Completed: Relaxation/Coping Techniques;S/S of depression;Effects of stress on body      Goals  Goals (Next 30 days): Patient demonstrates understanding of stress, no goals identified for the next 30 days      Goals Met  Goals Met: Identified Support system;Oriented to stress management classes;Identify stressors;Practicing stress management " skills      Psychosocial Follow-up  Follow-up/Discharge: Pt enjoys bike riding, skate boarding and being with his grandchildren.              Patient involved in Goal setting?: Yes      Signature: _____________________________________________________________    Date: __________________    Time: __________________

## 2021-05-30 NOTE — PROGRESS NOTES
ITP ASSESSMENT   Assessment Day: Last Day    Session Number: 23    Diagnosis: MI;CAB (x4. 2/17/19, 2/20/19)    Risk Stratification: Medium    Referring Provider: Shawn Lee CNP   ITP:Dr. Russell  EXERCISE  Exercise Assessment: Discharge       6 Minute Walk Test   Pre   Pre Exercise HR: 71                    Pre Exercise BP: 112/78      Peak  Peak HR: 104                   Peak BP: 150/88    Peak feet: 2075    Peak O2 SAT: 98    Peak RPE: 11    Peak MPH: 3.93      Symptoms:  Peak Symptoms: none      5 mins. Post  5 Min Post HR: 74    5 Min Post BP: 128/76                           Exercise Plan    Education Goals: All goals in this section met    Education Goals Met: Patient can state cardiac s/s and appropriate emergency response.;Has system for taking medication.;Medication review.                          Goals Met                                    120 day ADL'S goals met: Pt has increased MET level to 5.9-goal met    120 day Leisure goals met: Pt has been lifting wts consistently-goal met    120 day Work goals met: Goal met    120 Day Progress: Pt has progressed tp 5.9 METS< requesting to be done next session      90 day ADL'S goals met: Goal partially met: Pt continues to increase MET level. Pt currently on 5.6 METs in TM    90 day Leisure goals met: Goal partially met: Pt continues to work on upper body strength    90 day Work goals met: Goal met: Pt is able to lift 30-40lb without any difficulty    90 Day Progress: Pt has reached 5.6 on TM       60 day ADL'S goals met: Goal partially met. Patient has reached a MET level of 5.1 on the treadmill.    60 day Leisure goals met: Goal partially met. Patient has noticed an increase in upper extremity strength and endurance. Patient would still like to continue to increase upper extremity strength.    60 day Work goals met: Patient has been lifting up to 20 pounds and is tolerating this well.    60 Day Progression: Patient is making good progress towards his  "stated goals.      30 day ADL'S goals met: Goal met: pt has resumed grocery shopping.     30 day Leisure goals met: Goal not met: pt is using arms on Nustep and gets muscular soreness and left pinched n. in arm    30 day Work goals met: Pt has returned to work but is on restrictions. COntinue to build strength and stamina. He walks alot and tolerates well. Goal is 30-40lbs in future.     30 Day Progression: Pt has reached a 5.1 MET on TM        Exercise Prescription      Current Exercise (mins/week): 305      Interventions  Home Exercise:  Mode: walking, biking    Frequency: 5x/week    Duration: 40-50 min      Education Material : Educational videos;Provide written material;Individual education and counseling;Offer educational classes      Education Completed  Exercise Education Completed: Cardiac Anatomy;Signs and Symptoms;Medication review;RPE;Emergency Plan;Home Exercise;Warm up/cool down;FITT Principles;BP/HR Reponse to exercise;Stretching;Strength training;Benefits of Exercise;End point of exercise              Exercise Follow-up/Discharge  Follow up/Discharge: Pt has reached 5.9 METS, Discharge   NUTRITION  Nutrition Assessment: Discharge      Nutrition Risk Factors:  Nutrition Risk Factors: Dyslipidemia;Overweight      Nutrition Plan  Interventions  Other Nutrition Intervention: Diet Class;Therapist/Pt Discussion;Provide with Written Material    Initial Rate Your Plate Score: 57    Pre     Follow-Up Rate Your Plate Score: 63      Education Completed  Nutrition Education Completed: Low Saturated fat diet;Risk factor overview;Low sodium diet          Goals Met  Nutrition Goals Met: Patient follows a low sodium diet;Patient states following a low saturated fat diet;Patient knows appropriate portion size;Reviewed Dietitian schedule;Provided Rate your Plate Survey;Completed Nutritional Risk Screen;Patient can identify their risk factors for CAD      Height, Weight, and  BMI  Weight: 165 lb (74.8 kg)  Height: 5' 6\" " (1.676 m)  BMI: 26.64    Pre BMI: 26.64     Nutrition Follow-up  Follow-up/Discharge: Discharge         Other Risk Factors  Other Risk Factor Assessment: Discharge      HTN Risk Factor: Hypertension      Pre Exercise BP: 112/78  Post Exercise BP: 104/76      Hypertension Plan      Goals Met  HTN Goals Met: Follow low sodium diet;Take medication as prescribed;Exercises regularly      HTN Interventions  HTN Interventions: Diet consult;Therapist/patient discussion;Offer educational classes;Provide written material      HTN Education Completed  HTN Education Completed: Low sodium diet;Medication review;Risk factor overview      Tobacco Risk Factor: NA         PSYCHOSOCIAL  Psychosocial Assessment: Discharge       [place] COOP Q of L Summary Score: 11      Psychosocial Risk Factor: NA      Psychosocial Plan  Interventions    Interventions: Individual education and counseling;Provide written material;Offer educational videos and classes      Education Completed  Education Completed: Relaxation/Coping Techniques;S/S of depression;Effects of stress on body          Goals Met  Goals Met: Identified Support system;Oriented to stress management classes;Identify stressors;Practicing stress management skills      Psychosocial Follow-up  Follow-up/Discharge: Discharge             Patient involved in Goal setting?: Yes, Pt ready for discharge.  Pt is pleased with progress made in cardiac rehab.  Home program instructions given to pt, pt verbalized good understanding.      Signature: _____________________________________________________________    Date: __________________    Time: __________________

## 2021-05-30 NOTE — PROGRESS NOTES
[facility] Home Exercise Program/Discharge Summary  You have reached a 5.9 MET level and have completed 23 sessions of Cardiac Rehab.   Exercise Goals:   4-6x/week for aerobic exercise 30-60 minutes and 2-3x/week for strength training.   Modality Duration Intensity/  Rate of Perceived Exertion  (RPE: 11-14)   Warm-up 5 minutes 8-10   Walk 35-45 minutes 11-14   Bike 35-45 minutes 11-14   Cool Down 5 minutes 8-10   Strength Training *every other day 10-15 minutes 3 sets of 10 reps  Increase weights as tolerated.   Stretching 5 minutes 8-10   Continuous Exercise Heart Rate Guidelines:   20-30bpm> RHR (Resting Heart Rate)     Special Recommendations:    Continue to follow low fat, low salt, heart healthy diet.    Continue to follow up with your doctors/providers as recommended (e.g cholesterol).    A well rounded exercise program will included aerobic/cardiovascular exercise (e.g like walking, biking, or swimming ), strength training (e.g. free weights, exercise bands, or weight machines) and stretching program.   Stop Exercise!!! If any of the following occur:    Angina/chest pain    Dizziness    Excessive perspiration/cold sweats    Abnormal shortness of breath    Changes in heart rate (slow, fast, irregular)    Sudden fatigue or numbness    Nausea  Also...    Avoid extreme temperatures - exercise indoors if necessary:   Temp+ Humidity >160, Temp-Wind Chill <20    Wait at least 1 hour after a meal before strenuous activity    Do not exercise if you have a fever or are ill    Wear comfortable, supportive athletic clothing and shoes.  You are now on your way to a heart healthy lifestyle on your own. You can do it!

## 2021-06-01 VITALS — HEIGHT: 68 IN | BODY MASS INDEX: 25.4 KG/M2 | WEIGHT: 167.6 LBS

## 2021-06-01 NOTE — PATIENT INSTRUCTIONS - HE
Try to avoid using q-tips in the ears.    Try some over the counter sudafed and flonase nasal spray and see if that crackle and discomfort feeling go away. If this is bothering you in 1 month, let me know and I can connect you with ENT (ears, nose, and throat).    If you get fevers, chills, or a lot of pain with laying down, it may be growing into an infection. Check back if that happens.    Thank you for coming in today!    If you receive a survey from [employer] about your experience today, it would be very helpful if you could fill it out to let us know what went well and what we can improve!    General Information:    Today you had your appointment with Shawn Lee NP    My hours are:    Monday : Out of clinic  Tuesday : 8:00AM - 5:00 PM  Wednesday: 8:00AM - 5:00 PM  Thursday: 8:00AM - 5:00 PM  Friday: 8:00AM - 5:00 PM    I am not in the office Mondays. Therefore non-urgent calls and medical messages received on Monday will be addressed when I am back in the office on Tuesday. Urgent matters will be reviewed and addressed by one of my partners in the office as needed.    If lab work was done today as part of your evaluation you will generally be contacted via [employer], mail, or phone with the results within 1-5 days. If there is an alarming result we will contact you by phone. Lab results come back at varying times, I generally wait until all lab results are available before making comments on the results.     If you need refills please contact your pharmacy. They will send a refill request to me to review. Please allow 3-5 business days for us to process all refill requests.     My Clinical Assistant is Taylor. Please call us at [phone] or send a medical message with any questions or concerns.

## 2021-06-01 NOTE — PROGRESS NOTES
"Chief Complaint   Patient presents with     Ear Fullness     Feels cracking in his right ear. Has been going on for about a week. Starting to be painful.        HPI: Patient presents today with 1 week of a crackling sensation in his right ear along with some discomfort.  No issues with the left.  Atraumatic.  He does use Q-tips.  He has not noticed any drainage out of it.  No recent upper respiratory infections or seasonal allergy issues right now.  This is never happened before.    ROS:Review of Systems - negative except for what's listed in the HPI    SH: The Patient's  reports that he has never smoked. He has never used smokeless tobacco. He reports that he does not drink alcohol or use drugs.      FH: The Patient's family history includes Heart attack (age of onset: 42) in his father; Heart disease in his paternal grandfather; Hyperlipidemia in his sister.     Meds:    Current Outpatient Medications on File Prior to Visit   Medication Sig Dispense Refill     aspirin 81 mg chewable tablet Chew 81 mg daily.       clopidogrel (PLAVIX) 75 mg tablet TAKE 1 TABLET BY MOUTH EVERY DAY 90 tablet 0     evolocumab 140 mg/mL PnIj Inject 140 mg under the skin every 14 (fourteen) days. 2 Syringe 11     ezetimibe (ZETIA) 10 mg tablet Take 1 tablet (10 mg total) by mouth daily. 90 tablet 1     lisinopril (PRINIVIL,ZESTRIL) 10 MG tablet TAKE 1 TABLET DAILY 90 tablet 1     metoprolol tartrate (LOPRESSOR) 50 MG tablet TAKE 1 TABLET BY MOUTH TWICE A DAY 60 tablet 0     rosuvastatin (CRESTOR) 40 MG tablet TAKE 1 TABLET DAILY 90 tablet 1     acetaminophen (TYLENOL) 325 MG tablet Take 2 tablets (650 mg total) by mouth every 6 (six) hours as needed for pain.  0     metoprolol tartrate (LOPRESSOR) 50 MG tablet TAKE 1 TABLET BY MOUTH TWICE A  tablet 2     No current facility-administered medications on file prior to visit.        O:  /90   Pulse 63   Temp 98.1  F (36.7  C) (Oral)   Ht 5' 6.25\" (1.683 m)   Wt 171 lb (77.6 " kg)   SpO2 95%   BMI 27.39 kg/m      Physical Examination:   General appearance - alert, well appearing, and in no distress  Mental status - alert, oriented to person, place, and time  Eyes - pupils equal and reactive, extraocular eye movements intact  Ears -left TM and canal normal.  Right TM shows hard cerumen practicing against the tympanic membrane.    Nose - normal and patent, no erythema, discharge or polyps  Mouth - mucous membranes moist, pharynx normal without lesions  Neck - supple, no significant adenopathy  Lymphatics - no palpable lymphadenopathy, no hepatosplenomegaly  Chest - clear to auscultation, no wheezes, rales or rhonchi, symmetric air entry  Heart - normal rate and regular rhythm, S1 and S2 normal, no murmurs noted    A/P:     Problem List Items Addressed This Visit     None      Visit Diagnoses     Impacted cerumen of right ear    -  Primary    Needs flu shot        Relevant Orders    Influenza, Recombinant, Inj, Quadrivalent, PF, 18+YRS (Completed)            1. Impacted cerumen of right ear  Cerumen was removed with lavage and plastic curette.  Well-tolerated.  Patient notes improvement in his symptoms.  If crackling sensation continues, trial of over-the-counter decongestants for the next month.  Avoid pseudoephedrine given cardiac history.  If persistent beyond 1 month, ENT.    2. Needs flu shot  - Influenza, Recombinant, Inj, Quadrivalent, PF, 18+YRS        Shawn Lee, CNP

## 2021-06-02 VITALS — WEIGHT: 159 LBS | BODY MASS INDEX: 25.47 KG/M2

## 2021-06-02 VITALS — WEIGHT: 158 LBS | BODY MASS INDEX: 25.31 KG/M2

## 2021-06-02 VITALS
WEIGHT: 160.1 LBS | BODY MASS INDEX: 25.73 KG/M2 | HEIGHT: 66 IN | HEIGHT: 66 IN | BODY MASS INDEX: 25.73 KG/M2 | WEIGHT: 160.1 LBS

## 2021-06-02 VITALS — BODY MASS INDEX: 25.98 KG/M2 | WEIGHT: 162.2 LBS

## 2021-06-02 VITALS — BODY MASS INDEX: 25.95 KG/M2 | WEIGHT: 162 LBS

## 2021-06-02 VITALS — WEIGHT: 161.2 LBS | BODY MASS INDEX: 25.82 KG/M2

## 2021-06-02 VITALS — BODY MASS INDEX: 25.37 KG/M2 | WEIGHT: 158.4 LBS

## 2021-06-02 VITALS — BODY MASS INDEX: 25.44 KG/M2 | WEIGHT: 158.8 LBS

## 2021-06-02 VITALS — BODY MASS INDEX: 25.89 KG/M2 | WEIGHT: 161.6 LBS

## 2021-06-02 VITALS — WEIGHT: 160 LBS | HEIGHT: 66 IN | BODY MASS INDEX: 25.71 KG/M2

## 2021-06-02 VITALS — WEIGHT: 159.2 LBS | BODY MASS INDEX: 25.5 KG/M2

## 2021-06-02 VITALS — WEIGHT: 160.6 LBS | BODY MASS INDEX: 25.73 KG/M2

## 2021-06-02 VITALS — WEIGHT: 162 LBS | BODY MASS INDEX: 25.95 KG/M2

## 2021-06-02 VITALS — WEIGHT: 160.2 LBS | BODY MASS INDEX: 25.66 KG/M2

## 2021-06-02 VITALS — BODY MASS INDEX: 25.39 KG/M2 | HEIGHT: 66 IN | WEIGHT: 158 LBS

## 2021-06-02 VITALS — BODY MASS INDEX: 26.27 KG/M2 | WEIGHT: 164 LBS

## 2021-06-02 VITALS — BODY MASS INDEX: 25.88 KG/M2 | HEIGHT: 66 IN | WEIGHT: 161 LBS

## 2021-06-02 VITALS — BODY MASS INDEX: 26.43 KG/M2 | WEIGHT: 165 LBS

## 2021-06-02 VITALS — WEIGHT: 164 LBS | BODY MASS INDEX: 26.27 KG/M2

## 2021-06-02 VITALS — WEIGHT: 160.4 LBS | BODY MASS INDEX: 25.69 KG/M2

## 2021-06-02 VITALS — WEIGHT: 161 LBS | BODY MASS INDEX: 25.79 KG/M2

## 2021-06-02 VITALS — WEIGHT: 164 LBS | BODY MASS INDEX: 26.36 KG/M2 | HEIGHT: 66 IN

## 2021-06-03 VITALS
OXYGEN SATURATION: 95 % | HEART RATE: 63 BPM | DIASTOLIC BLOOD PRESSURE: 90 MMHG | SYSTOLIC BLOOD PRESSURE: 110 MMHG | TEMPERATURE: 98.1 F | WEIGHT: 171 LBS | HEIGHT: 66 IN | BODY MASS INDEX: 27.48 KG/M2

## 2021-06-03 VITALS — WEIGHT: 164 LBS | BODY MASS INDEX: 26.27 KG/M2

## 2021-06-03 VITALS — WEIGHT: 166 LBS | BODY MASS INDEX: 26.68 KG/M2 | HEIGHT: 66 IN

## 2021-06-03 VITALS — WEIGHT: 165 LBS | BODY MASS INDEX: 26.43 KG/M2

## 2021-06-03 VITALS — WEIGHT: 160 LBS | BODY MASS INDEX: 25.63 KG/M2

## 2021-06-04 VITALS
BODY MASS INDEX: 27.48 KG/M2 | HEIGHT: 66 IN | SYSTOLIC BLOOD PRESSURE: 132 MMHG | WEIGHT: 171 LBS | RESPIRATION RATE: 16 BRPM | HEART RATE: 64 BPM | DIASTOLIC BLOOD PRESSURE: 86 MMHG

## 2021-06-05 VITALS
WEIGHT: 173 LBS | HEIGHT: 66 IN | BODY MASS INDEX: 27.8 KG/M2 | HEART RATE: 69 BPM | DIASTOLIC BLOOD PRESSURE: 90 MMHG | SYSTOLIC BLOOD PRESSURE: 144 MMHG | RESPIRATION RATE: 14 BRPM

## 2021-06-05 NOTE — TELEPHONE ENCOUNTER
Lipid 7/25/19 results shared. She will contact pt's other physicians to discuss more recent labs and new cholesterol medication that is an injection. H.DeGree, CMA

## 2021-06-05 NOTE — TELEPHONE ENCOUNTER
Called [facility] pharmacy and updated them that a copy of a repatha co-pay card would be faxed over. This was done to [facility] in [employer] in [place]. Pt was updated that this was done and it should hopefully work but just in case, the co-pay card will be mailed to his home. If the CO-pay card does not work, he will call back for other options but he confirms that he does have commercial insurance. -[facility]        ----- Message from Mehran Montoya) MD [place] sent at 1/14/2020  9:05 AM CST -----  Regarding: RE: repatha co pay coard  Go ahead  ----- Message -----  From: Arlette Manning RN  Sent: 1/14/2020   8:49 AM CST  To: Mehran Lozada MD (Ted)  Subject: repatha co pay coard                             Hi,  I see that patient advice request in your in-basket. If it is ok with you, I can offer him a Repatha co-pay card as it looks like he has commercial insurance. We could see if that works for him before changing it up?  Thanks,  David

## 2021-06-05 NOTE — TELEPHONE ENCOUNTER
Attempted again to call Mary [facility]. Left message for callback to clinic.     Taylor Estevez, [facility]

## 2021-06-05 NOTE — TELEPHONE ENCOUNTER
Test Results  Who is calling?:  [facility]   Who ordered the test:  N/A  Type of test: Lab  Date of test:    Where was the test performed:    What are your questions/concerns?:  [facility] is requesting a call with test results of patient   Okay to leave a detailed message?:  Yes

## 2021-06-05 NOTE — TELEPHONE ENCOUNTER
Attempted to call [facility] regarding this message. Will try again in a little bit.     Taylor Estevez, CMA

## 2021-06-06 ENCOUNTER — HEALTH MAINTENANCE LETTER (OUTPATIENT)
Age: 66
End: 2021-06-06

## 2021-06-06 NOTE — PATIENT INSTRUCTIONS - HE
Eleuterio Camejo,    It was a pleasure to see you today at the [facility].     My recommendations after this visit include:    Stop Plavix    WJb Lozada MD, FACC, KAITLYN

## 2021-06-08 NOTE — PROGRESS NOTES
"Cardiology Progress Note    Assessment:  Coronary artery disease status post multivessel intervention(distal RCA and diagonal 1) in 2003, no angina  Severe hyperlipidemia/suspect familial hyperlipidemia on maximal dose of Crestor and Crestor  Hypertension, good control      Plan:  Check lipid profile today  No medication changes  Follow-up in 1 year    Subjective:   This is 61 y.o. male who comes in today for follow-up visit.  He reports no new chest symptoms.  He remains physically active.  He tolerates Crestor and Zetia well.    Review of Systems:   General: WNL  Eyes: WNL  Ears/Nose/Throat: WNL  Lungs: WNL  Heart: WNL  Stomach: WNL  Bladder: WNL  Muscle/Joints: WNL  Skin: WNL  Nervous System: WNL  Mental Health: WNL     Blood: WNL    Objective:   Visit Vitals     /64 (Patient Site: Right Arm, Patient Position: Sitting, Cuff Size: Adult Regular)     Pulse 72     Resp 16     Ht 5' 6\" (1.676 m)     Wt 161 lb (73 kg)     BMI 25.99 kg/m2     Physical Exam:  GENERAL: no distress  NECK: No JVD  LUNGS: Clear to auscultation.  CARDIAC: regular rhythm, S1 & S2 normal.  No heaves, thrills, gallops or murmurs.  ABDOMEN: flat, negative hepatosplenomegaly, soft and non-tender.  EXTREMITIES: No evidence of cyanosis, clubbing or edema.    Current Outpatient Prescriptions   Medication Sig Dispense Refill     aspirin 325 MG tablet Take 325 mg by mouth daily.       atenolol (TENORMIN) 100 MG tablet TAKE 1 TABLET BY MOUTH EVERY DAY 90 tablet 0     ezetimibe (ZETIA) 10 mg tablet Take 1 tablet (10 mg total) by mouth daily. 90 tablet 1     lisinopril (PRINIVIL,ZESTRIL) 10 MG tablet TAKE 1 TABLET BY MOUTH DAILY 90 tablet 0     rosuvastatin (CRESTOR) 40 MG tablet TAKE 1 TABLET BY MOUTH EVERY DAY 90 tablet 0     No current facility-administered medications for this visit.        Cardiographics:      Stress echo: November 2015 normal LV function and no ischemia  Coronary angio: 2003 stent to distal RCA and diagonal branch, mild " disease elsewhere    Lab Results:       Lab Results   Component Value Date    CHOL 204 (H) 11/05/2015    CHOL 190 06/11/2014    CHOL 200 (H) 06/24/2013     Lab Results   Component Value Date    HDL 38 (L) 11/05/2015    HDL 40 06/11/2014    HDL 36 (L) 06/24/2013     Lab Results   Component Value Date    LDLCALC 122 11/05/2015    LDLCALC 121 06/11/2014    LDLCALC 127 06/24/2013     Lab Results   Component Value Date    TRIG 221 (H) 11/05/2015    TRIG 147 06/11/2014    TRIG 187 (H) 06/24/2013     No components found for: CHOLHDL  No results found for: BNP    Mehran (Michael)  MD [place]

## 2021-06-08 NOTE — TELEPHONE ENCOUNTER
[facility] [employer] is no longer covered by pt's insurance. We are switching to [employer]ral[employer]. New order placed. Not sure if a new PA will be needed.  Pt does have a month supply left of his Reptha. Thank you!

## 2021-06-08 NOTE — TELEPHONE ENCOUNTER
Received notification that pt's insurance will no longer provide coverage for Repatha. He will need to switch over to Praluent. Called pt to discuss further. Will need to find out how much supply of Repatha he has and if he is agreeable to making the switch.  -kcl

## 2021-06-15 NOTE — TELEPHONE ENCOUNTER
PA for Praluent came through but looks like pt is on Repatha. I faxed the forms to you but let me know what is paid for the best this year.

## 2021-06-16 PROBLEM — I25.2 HISTORY OF NON-ST ELEVATION MYOCARDIAL INFARCTION (NSTEMI): Status: ACTIVE | Noted: 2019-05-23

## 2021-06-16 PROBLEM — E78.01 FAMILIAL HYPERCHOLESTEROLEMIA: Status: ACTIVE | Noted: 2020-03-13

## 2021-06-16 NOTE — PATIENT INSTRUCTIONS - HE
Eleuterio Camejo,    It was a pleasure to see you today at the [facility].     My recommendations after this visit include:    Check your blood pressure 3 or 4 times a week and then call me with blood pressure readings in 3 weeks    Blood work    CHANDAN Lozada MD, FACC, KAITLYN

## 2021-06-17 NOTE — TELEPHONE ENCOUNTER
Telephone Encounter by Lizy Dubon at 3/4/2021 10:50 AM     Author: Lizy Dubon Service: -- Author Type: Financial Resource Guide    Filed: 3/4/2021 10:54 AM Encounter Date: 3/1/2021 Status: Signed    : Lizy Dubon (Financial Resource Guide)       Prior Authorization Approval  Medication: Praluent 75mg/ml  Qty: 2 pens for 28 days  Effective Dates: 3/2/21 - 3/2/22  Reference Number: NA  Insurance Company: [employer]  Pharmacy: [facility] [id number]  Expected Copay: $443.72  Copay Card Available: Yes  Foundation Assistance Needed/Available: not needed  Patient Assistance Program Needed: not needed  Patient Notified? Yes  Pharmacy Notified? Yes

## 2021-06-17 NOTE — TELEPHONE ENCOUNTER
Telephone Encounter by Lizy Dubon at 3/1/2021  2:34 PM     Author: Lizy Dubon Service: -- Author Type: Financial Resource Guide    Filed: 3/1/2021  2:36 PM Encounter Date: 3/1/2021 Status: Signed    : Lizy Dubon (Financial Resource Guide)

## 2021-06-17 NOTE — TELEPHONE ENCOUNTER
Telephone Encounter by Lizy Dubon at 3/4/2021 11:33 AM     Author: Lizy Dubon Service: -- Author Type: Financial Resource Guide    Filed: 3/4/2021 11:37 AM Encounter Date: 3/1/2021 Status: Addendum    : Lizy Dubon (Financial Resource Guide)    Related Notes: Original Note by Lizy Dubon (Financial Resource Guide) filed at 3/4/2021 11:33 AM       Called pharmacy to inform of PA approval and copay card info

## 2021-06-17 NOTE — PROGRESS NOTES
"Cardiology Progress Note    Assessment:  Coronary artery disease status post multivessel intervention(distal RCA and diagonal 1) in 2003, no angina  Severe hyperlipidemia/suspect familial hyperlipidemia on maximal dose of Crestor and Zetia, good control  Hypertension, good control      Plan:  Lipid profile, BMP and CBC today  Continue current cardiac medications    I discussed stress testing with him.  As long as he is active and free of any chest symptoms there would be very little benefits of stress test.    Follow-up in 1 year    Subjective:   This is 62 y.o. male who comes in today for follow-up visit.  He has done well.  He denies chest pain or shortness of breath.  He continues to be very active.  He does all kinds of outdoor activity including grafting mountain biking and hiking.  He has not had any chest symptoms with those activities.  He is compliant with cardiac medications    Review of Systems:   General: WNL  Eyes: WNL  Ears/Nose/Throat: WNL  Lungs: WNL  Heart: WNL  Stomach: WNL  Bladder: WNL  Muscle/Joints: WNL  Skin: WNL  Nervous System: WNL  Mental Health: WNL     Blood: WNL    Objective:   /82 (Patient Site: Left Arm, Patient Position: Sitting, Cuff Size: Adult Regular)  Pulse 68  Resp 16  Ht 5' 7.5\" (1.715 m) Comment: shoes on  Wt 167 lb 9.6 oz (76 kg) Comment: shoes on  BMI 25.86 kg/m2  Physical Exam:  GENERAL: no distress  NECK: No JVD  LUNGS: Clear to auscultation.  CARDIAC: regular rhythm, S1 & S2 normal.  No heaves, thrills, gallops or murmurs.  ABDOMEN: flat, negative hepatosplenomegaly, soft and non-tender.  EXTREMITIES: No evidence of cyanosis, clubbing or edema.    Current Outpatient Prescriptions   Medication Sig Dispense Refill     aspirin 325 MG tablet Take 81 mg by mouth daily.       atenolol (TENORMIN) 100 MG tablet TAKE 1 TABLET BY MOUTH EVERY DAY 90 tablet 0     ezetimibe (ZETIA) 10 mg tablet TAKE 1 TABLET(10 MG) BY MOUTH DAILY 90 tablet 0     lisinopril " (PRINIVIL,ZESTRIL) 10 MG tablet TAKE 1 TABLET BY MOUTH DAILY 90 tablet 0     rosuvastatin (CRESTOR) 40 MG tablet TAKE 1 TABLET BY MOUTH ONCE DAILY 90 tablet 1     No current facility-administered medications for this visit.        Cardiographics:    Stress echo: November 2015   normal LV function and no ischemia    Coronary angio: 2003   stent to distal RCA and diagonal branch, mild disease elsewhere    Lab Results:       Lab Results   Component Value Date    CHOL 135 01/23/2017    CHOL 204 (H) 11/05/2015    CHOL 190 06/11/2014     Lab Results   Component Value Date    HDL 31 (L) 01/23/2017    HDL 38 (L) 11/05/2015    HDL 40 06/11/2014     Lab Results   Component Value Date    LDLCALC 82 01/23/2017    LDLCALC 122 11/05/2015    LDLCALC 121 06/11/2014     Lab Results   Component Value Date    TRIG 110 01/23/2017    TRIG 221 (H) 11/05/2015    TRIG 147 06/11/2014     No components found for: CHOLHDL  No results found for: BNP    Mehran (Michael)  MD Neville

## 2021-06-17 NOTE — TELEPHONE ENCOUNTER
Telephone Encounter by Estephania Escamilla RN at 6/2/2020 12:43 PM     Author: Estephania Escamilla RN Service: -- Author Type: Registered Nurse    Filed: 6/2/2020 12:48 PM Encounter Date: 6/1/2020 Status: Signed    : Estephania Escamilla RN (Registered Nurse)       Michael Amanda  Estephania Escamilla RN    Caller: Unspecified (Today, 12:38 PM)               Patient has already contacted their pharmacy. The medication or refill issue is below:     Primary Cardiologist: DR SAUL   Medication: REPATHA   Issue / Concern: REPATHA WASN'T COVERED, AND NOW FINDS OUT IT IS COVERED AND NEEDS A REFILL, THANKS   Preferred Pharmacy/City: [facility] [place] [facility] Phone Number for Patient: [phone]     Additional Info:      Received notification that the pharmacy made a mistake and Repatha is indeed still covered by insurance. Discontinued Praluent and re-ordered Repatha as it was originally ordered.

## 2021-06-17 NOTE — TELEPHONE ENCOUNTER
Telephone Encounter by Estephania Escamilla RN at 6/2/2020  8:55 AM     Author: Estephania Escamilla RN Service: -- Author Type: Registered Nurse    Filed: 6/2/2020  9:00 AM Encounter Date: 6/1/2020 Status: Signed    : Estephania Escamilla RN (Registered Nurse)       EdnacarinaRika Kellie C, RN    Caller: Unspecified (Today,  8:09 AM)               General phone call:     Caller: Eleuterio   Primary cardiologist: KRISTI     Detailed reason for call: Eleuterio is returning a call regarding a medication change     Best phone number: [phone]   Best time to contact: anytime   Ok to leave a detailed message? yes   Device? no     Additional Info:      Pt's insurance no longer covers Repatha and is requesting that pt switch to praluent. Pt reports that he has about a month left of his repatha medication?    Dr. Lozada, pt's insurance no longer covers Repatha and is requesting the switch to the alternative Praluent. Ok to make this switch? Which dose would you like ordered.

## 2021-06-18 NOTE — LETTER
Letter by Mehran Lozada MD (Ted) at      Author: Mehran Lozada MD (Ted) Service: -- Author Type: --    Filed:  Encounter Date: 2/7/2019 Status: (Other)       Eleuterio Camejo  [address] [facility] [address]      February 7, 2019      Dear Eleuterio,    This letter is to remind you that you will be due for your follow up appointment with Dr. Michael Lozada  . To help ensure you are in the best health possible, a regular follow-up with your cardiologist is essential.     Please call our Patient Scheduling Line at [phone] to schedule your appointment at your earliest convenience.  If you have recently scheduled an appointment, please disregard this letter.    We look forward to seeing you again. As always, we are available at the number  above for any questions or concerns you may have.      Sincerely,     The Physicians and Staff of [facility]

## 2021-06-19 NOTE — LETTER
Letter by Mehran Lozada MD (Ted) at      Author: Mehran Lozada MD (Ted) Service: -- Author Type: --    Filed:  Encounter Date: 5/9/2019 Status: (Other)         Eleuterio Camejo  [address]  [place] [address]      May 9, 2019      Dear Eleuterio,    This letter is to remind you that you will be due for your follow up appointment with Dr. Michael Lozada . To help ensure you are in the best health possible, a regular follow-up with your cardiologist is essential.     Please call our Patient Scheduling Line at [phone] to schedule your appointment at your earliest convenience.  If you have recently scheduled an appointment, please disregard this letter.    We look forward to seeing you again. As always, we are available at the number  above for any questions or concerns you may have.      Sincerely,     The Physicians and Staff of [facility]

## 2021-06-19 NOTE — LETTER
Letter by Shawn Lee CNP at      Author: Shawn Lee CNP Service: -- Author Type: --    Filed:  Encounter Date: 2019 Status: (Other)               To whom it may concern,       Eleuterio Camejo,  1955, was last evaluated on 19 and is cleared to return back to work without any restrictions.  If you have any questions or concerns, please do not hesitate to reach out to me M-F 8A-5P.            Shawn Lee CNP

## 2021-06-21 ENCOUNTER — COMMUNICATION - HEALTHEAST (OUTPATIENT)
Dept: CARDIOLOGY | Facility: CLINIC | Age: 66
End: 2021-06-21

## 2021-06-21 DIAGNOSIS — E78.5 HYPERLIPIDEMIA: ICD-10-CM

## 2021-06-21 RX ORDER — EZETIMIBE 10 MG/1
TABLET ORAL
Qty: 90 TABLET | Refills: 0 | Status: SHIPPED | OUTPATIENT
Start: 2021-06-21 | End: 2021-09-13

## 2021-06-21 RX ORDER — ROSUVASTATIN CALCIUM 40 MG/1
TABLET, COATED ORAL
Qty: 90 TABLET | Refills: 0 | Status: SHIPPED | OUTPATIENT
Start: 2021-06-21 | End: 2021-09-13

## 2021-06-24 NOTE — ANESTHESIA CARE TRANSFER NOTE
Last vitals:   Vitals:    02/20/19 1155   BP: 108/66   Pulse: 85   Resp: 16   Temp: 36.6  C (97.8  F)   SpO2: 100%     Patient's level of consciousness is unresponsive and intubated/sedated  Spontaneous respirations: no: intubated/sedated  Maintains airway independently: no: intubated/sedated  Dentition unchanged: yes  Oropharynx: endotracheal tube in place    QCDR Measures:  ASA# 20 - Surgical Safety Checklist: WHO surgical safety checklist completed prior to induction    PQRS# 430 - Adult PONV Prevention: 4558F-1P - Medical reason for NOT administering combination therapy  ASA# 8 - Peds PONV Prevention: NA - Not pediatric patient, not GA or 2 or more risk factors NOT present  PQRS# 424 - Blanca-op Temp Management: 4559F - At least one body temp DOCUMENTED => 35.5C or 95.9F within required timeframe  PQRS# 426 - PACU Transfer Protocol: - Transfer of care checklist used  ASA# 14 - Acute Post-op Pain: ASA14B - Patient did NOT experience pain >= 7 out of 10

## 2021-06-24 NOTE — ANESTHESIA PREPROCEDURE EVALUATION
Anesthesia Evaluation      History of anesthetic complications (PONV)     Airway   Mallampati: I   Pulmonary     breath sounds clear to auscultation  (-) rales                         Cardiovascular   (+) hypertension, past MI (NSTEMI 2/18/19 - see cath report - now s/f CABG 2/20/19. ), CAD, CABG/stent ( LIZ x3 in 2003), angina, , hypercholesterolemia,     (-) CHF, cardiomyopathy  Rhythm: regular  Rate: normal,      ROS comment: Coronary angiogram 2/18/19:  LM normal  LAD mod to severe dz in prox LAD; severe dz in mid with JANEL 2 flow into LAD with collaterals via RCA; first diagonal stent 100%  with collaterals  Circ OM3 small 100% with collaterals  RCA mild to mod diffuse dz with stent in %  with collaterals via LAD  Imp/plan  1. Severe 3 vessel dz - including  of diagonal and PDA in-stent; OM3 and severe dz in mid LAD.  Start iv NTG given flow in LAD; restart heparin; discussed with CT surgery and they will see in AM, carotid US.  If symptoms tonight on iv NTG can consider PTCA mid LAD and possible IABP to bridge to CABG.   PCI is an option but CABG favored given multi vessel dz and lower risk for revascularization.      TTE 2/19/19-    When compared to the previous study dated 12/4/2015,    Left ventricle ejection fraction is normal. The calculated left ventricular ejection fraction is 66%.    Normal left ventricular size and systolic function.    Normal right ventricular size and systolic function.    Mild mitral regurgitation     Carotid Doppler 2/19/19-  CONCLUSION:  1.  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 Patent common and external carotid arteries.  2.  LEFT: Patent common, internal and external carotid arteries. Trace atheromatous plaque. No hemodynamically significant stenosis.     Neuro/Psych      Endo/Other    (-) no diabetes (A1c 5.3), no obesity (BMI 27)     GI/Hepatic/Renal    (+) GERD (PUD),     (-) renal disease (Cr 0.77)     Other  findings: 2/19/19-  Hgb 15.6,    T&S complete 2/19/19      Dental - normal exam                        Anesthesia Plan  Planned anesthetic: general endotracheal  Pre-induction arterial line   Intraop NICHOLAS  ASA 4   Induction: intravenous   Anesthetic plan and risks discussed with: patient  Anesthesia plan special considerations: CVP line, arterial catheterization, IV therapy two IVs, dexmedetomidine  Post-op plan: extended intubation/vent support

## 2021-06-24 NOTE — PROGRESS NOTES
Cardiac Rehab  Phase II Assessment    Assessment Date: 3/4/2019      Diagnosis: NSTEMI, CABG x4  Date of Onset: 2/17/19, 2/20/19  Procedure: 2/18/19 Angio-3 vessel disease, CABG x4  Date of Onset: 2/18/19, 2/20/19  ICD/Pacemaker: No Parameters: N/A  Post-op Complications: anemia  ECG History: NSR/ST   EF%:66 TTE 2/18/19  Past Medical History:   Past Medical History:   Diagnosis Date     Coronary artery disease 2003    Stent placement x3     Essential hypertension      Hyperlipidemia      Peptic ulcer disease      Postoperative nausea and vomiting      Past Surgical History:   Procedure Laterality Date     CV CORONARY ANGIOGRAM N/A 2/18/2019    Procedure: Coronary Angiogram;  Surgeon: Sesar Macdonald MD;  Location: [facility];  Service: Cardiology     CV LEFT HEART CATHETERIZATION WO LEFT VETRICULOGRAM Left 2/18/2019    Procedure: Left Heart Catheterization Without Left Ventriculogram;  Surgeon: Sesar Macdonald MD;  Location: [facility];  Service: Cardiology     TN CABG, ARTERIAL, FOUR+ N/A 2/20/2019    Procedure: anesthesia, transesophageal echocardiogram, CORONARY ARTERY BYPASS GRAFT X4, left internal mammary artery, endoscopic vein harvest;  Surgeon: Radha Sandoval MD;  Location: [facility] OR;  Service: Cardiovascular     TN INSERT INTRACORONARY STENT  2003    x3- Cath Stent Placement;  Proc Date: 01/01/2003;  Comments: LIZ MID RIGHT POSTEIOR ; ; PROX FIRST DIAGNOL     TONSILLECTOMY       TUMOR REMOVAL      Scalp.  Benign.     Social History     Socioeconomic History     Marital status:      Spouse name: Not on file     Number of children: Not on file     Years of education: Not on file     Highest education level: Not on file   Occupational History     Not on file   Social Needs     Financial resource strain: Not on file     Food insecurity:     Worry: Not on file     Inability: Not on file     Transportation needs:     Medical: Not on file     Non-medical: Not  on file   Tobacco Use     Smoking status: Never Smoker     Smokeless tobacco: Never Used   Substance and Sexual Activity     Alcohol use: No     Frequency: Never     Drug use: No     Sexual activity: Not on file   Lifestyle     Physical activity:     Days per week: Not on file     Minutes per session: Not on file     Stress: Not on file   Relationships     Social connections:     Talks on phone: Not on file     Gets together: Not on file     Attends Rastafari service: Not on file     Active member of club or organization: Not on file     Attends meetings of clubs or organizations: Not on file     Relationship status: Not on file     Intimate partner violence:     Fear of current or ex partner: Not on file     Emotionally abused: Not on file     Physically abused: Not on file     Forced sexual activity: Not on file   Other Topics Concern     Not on file   Social History Narrative     Not on file     Family History   Problem Relation Age of Onset     Heart attack Father 42     Hyperlipidemia Sister      Heart disease Paternal Grandfather      Physical Assessment  Precautions/ Physical Limitations: Surgical, CAD  Oxygen: No  O2 Sats: 97 Lung Sounds:LLL diminshed Edema: none  Incisions: healing well, minimal clear drainage and band-aide applied healing well  Sleeping Pattern: fair   Appetite: fair   Nutrition Risk Screen: Weight loss goal 15lbs weight loss    Pain  Location: 3 (chest and 6/10 Left arm  Characteristics:ache/tingling  Intensity: (0-10 scale) 6  Current Pain Management: Tramadol, Tylenol  Intervention: avoid arm use, pain medications  Response:  Help- pain is mangable    Psychosocial/ Emotional Health  1. In the past 12 months, have you been in a relationship where you have been abused physically, emotionally, sexually or financially? No  notified: N/A  2. Who do you turn to for emotional support?: kids/friend/family/Lutheran community  3. Do you have cultural or spiritual needs? No  4. Have  there been any major life changes in the past 12 months? MI/CAB    Referral Information  Primary Physician: LAKEISHA: Shawn Lee  Cardiologist: Dr. Lozada  Surgeon: Dr. Jaime Dawson exercise/Equipment: bike -for bike    Patient's long-term goal(s): resume skiing, snowboarding, skateboarding, fishing and all household activities and build houses/mission trip    1. Living Accommodations: [facility] Steps: Yes-3 levels      Support people at home: son   2. Marital Status:   3. Family is able to assist with cares      Christian/Community involvement: [employer]ology  4. Recreation/Hobbies: see above LTG

## 2021-06-24 NOTE — ANESTHESIA POSTPROCEDURE EVALUATION
Patient: Eleuterio Camejo  anesthesia, transesophageal echocardiogram, CORONARY ARTERY BYPASS GRAFT X4, left internal mammary artery, endoscopic vein harvest  Anesthesia type: general    Patient location: ICU  Last vitals:   Vitals:    02/20/19 1646   BP:    Pulse:    Resp:    Temp:    SpO2: 94%     Post vital signs: stable  Level of consciousness: responds to simple questions, responds to stimulation and sedated  Post-anesthesia pain: pain controlled  Post-anesthesia nausea and vomiting: no  Pulmonary: ETT, ventilator, weaning to extubation  Cardiovascular: on vasoactives  Hydration: adequate  Anesthetic events: no    QCDR Measures:  ASA# 11 - Blanca-op Cardiac Arrest: ASA11B - Patient did NOT experience unanticipated cardiac arrest  ASA# 12 - Blanca-op Mortality Rate: ASA12B - Patient did NOT die  ASA# 13 - PACU Re-Intubation Rate: ASA13X - Exclusion: organ donor or direct ICU transfer  ASA# 10 - Composite Anes Safety: ASA10A - No serious adverse event    Additional Notes:  Report given by me to Leslie Carbone, CNP

## 2021-06-24 NOTE — ANESTHESIA PROCEDURE NOTES
NICHOLAS    Patient location during procedure: OR  Start time: 2/20/2019 8:03 AM  Staffing:  Performing  Anesthesiologist: Marylu Pierce MD  NICHOLAS:  Type/Reason: Monitoring NICHOLAS  Technique: blind insertion  Difficulty: easy  Anesthesia Monitoring: see additional note    Additional Notes  Findings of note:  Mild MVR  Normal EF 56%, no notable RWMAs  Aortic valve trileaflet, freely mobile  Mild calcification in aorta  Central line in appropriate position  Normal coronary sinus  No clot noted in LA.  No evidence of PFO

## 2021-06-24 NOTE — PROGRESS NOTES
ITP ASSESSMENT   Assessment Day: Initial    Session Number: 1  Precautions: Surgical    Diagnosis: MI;CAB (x4. 2/17/19, 2/20/19)    Risk Stratification: Medium    Referring Provider: Dr. Tenorio  ITP: Dr. Russell  EXERCISE  Exercise Assessment: Initial       6 Minute Walk Test   Pre   Pre Exercise HR: 87                    Pre Exercise BP: 110/78      Peak  Peak HR: 103                   Peak BP: 138/70    Peak feet: 1550    Peak O2 SAT: 95    Peak RPE: 11    Peak MPH: 2.94      Symptoms:  Peak Symptoms: No change in Incisional chest discomfor or left arm ache/tingling-since he has had from surgery      5 mins. Post  5 Min Post HR: 87    5 Min Post BP: 118/68                           Exercise Plan  Goals Next 30 days  ADL'S: Resume driving    Leisure: Resume light household cleaning: Dishes/Laundry 2-2.3METs    Work: Medical Leave-Continue to build stamina. 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      Exercise Prescription  Exercise Mode: Treadmill;Bike;Nustep;Hallway Walking;Stairs    Frequency: 2x/week    Duration: 30-45minutes    Intensity / THR: 20-30 beats above resting heart rate    RPE 11-14  Progression / Met level: 3.2-4.5+    Resistive Training?: No      Current Exercise (mins/week): 140 (10' walks 2x/day)      Interventions  Home Exercise:  Mode: Walking    Frequency: 2-3x/week    Duration: 15' x2/day on non rehab days      Education Material : Educational videos;Provide written material;Individual education and counseling;Offer educational classes      Education Completed  Exercise Education Completed: Cardiac Anatomy;Signs and Symptoms;Medication review;RPE;Emergency Plan;Home Exercise;Warm up/cool down;FITT Principles;BP/HR Reponse to exercise;Stretching;Strength training;Benefits of Exercise;End point  "of exercise              Exercise Follow-up/Discharge  Follow up/Discharge: Pt plans to walk in the house because weather does not permit. Pt was encouraged to wait on riding his bike due to the pressure on his arms on his bike that he puts on a  during the winter.    NUTRITION  Nutrition Assessment: Initial      Nutrition Risk Factors:  Nutrition Risk Factors: Dyslipidemia;Overweight  Cholesterol: 2/18/19: 141  LDL: 85  HDL: 32  Triglycerides: 118      Nutrition Plan  Interventions  Diet Consult: NA (Offer to patient next session)    Other Nutrition Intervention: Diet Class;Therapist/Pt Discussion;Provide with Written Material    Initial Rate Your Plate Score: 0 (Diet Habit Survey given-encouraged to complete before next x)      Education Completed  Nutrition Education Completed: Low Saturated fat diet;Risk factor overview;Low sodium diet      Goals  Nutrition Goals (Next 30 days): Patient will follow a low sodium diet;Patient will follow a low saturated fat diet;Patient knows appropriate portion size;Patient can identify their risk factors for CAD      Goals Met  Nutrition Goals Met: Patient follows a low sodium diet;Patient states following a low saturated fat diet;Reviewed Dietitian schedule;Provided Rate your Plate Survey;Completed Nutritional Risk Screen      Height, Weight, and  BMI  Weight: 161 lb 3.2 oz (73.1 kg) (Pt had layers and heavy shoes on)  Height: 5' 6\" (1.676 m)  BMI: 26.03      Nutrition Follow-up  Follow-up/Discharge: Pt's daughters have set up for their dad to get meals on a daily basis through Facebook group/[employer]-which is set up to gear towards heart healthy meals: low salt and low fat. Pt has known CAD and stents in 2003-Pt will continue to follow low fat and low salt heart healthy diet. Pt would like to lose about 15lbs.          Other Risk Factors  Other Risk Factor Assessment: Initial      HTN Risk Factor: Hypertension      Pre Exercise BP: 110/78  Post Exercise BP: " 118/68      Hypertension Plan  Goals  HTN Goals: Patient demonstrates understanding of HTN, no goals identified for the next 30 days      Goals Met  HTN Goals Met: Follow low sodium diet;Take medication as prescribed;Exercises regularly      HTN Interventions  HTN Interventions: Diet consult;Therapist/patient discussion;Offer educational classes;Provide written material      HTN Education Completed  HTN Education Completed: Low sodium diet;Medication review;Risk factor overview      Tobacco Risk Factor: NA  Risk Factor Follow-up   Follow-up/Discharge: CRF: Family Hx, HTN, HLD and slightly overweight. Pt overall eats heart healthy, walking daily and takes medications as prescribed to manage CRF.      PSYCHOSOCIAL  Psychosocial Assessment: Initial       [facility] Q of L Summary Score: 22      NASREEN-D Score: 4      Psychosocial Risk Factor: NA (Pt feels that his stress is mangeable.)      Psychosocial Plan  Interventions  If NASREEN-D > 15 send letter to MD  Interventions: Individual education and counseling;Provide written material;Offer educational videos and classes      Education Completed  Education Completed: Relaxation/Coping Techniques;S/S of depression;Effects of stress on body      Goals  Goals (Next 30 days): Patient demonstrates understanding of stress, no goals identified for the next 30 days      Goals Met  Goals Met: Identified Support system;Oriented to stress management classes;Identify stressors;Practicing stress management skills      Psychosocial Follow-up  Follow-up/Discharge: Pt feels that his stressors are manageable at this time. He reports a great support system with 4 kids, family and friends and [employer]. He enjoys relaxing by keeping active with skiing, snowboarding, skate boarding and also bikes/cycles outside when weather permits.              Patient involved in Goal setting?: Yes

## 2021-06-24 NOTE — PATIENT INSTRUCTIONS - HE
Keep doing everything you're doing!    If you start having those arm issues again, give your surgeons office a call. I anticipate this is just some compression from the surgery, but they'll want to know about it.    We'll give you a call in about 6 months to come on in for a physical.    Thank you for coming in today!    If you receive a survey from [employer] about your experience today, it would be very helpful if you could fill it out to let us know what went well and what we can improve!    General Information:    Today you had your appointment with Shawn Lee NP    My hours are:    Monday : Out of clinic  Tuesday : 8:00AM - 5:00 PM  Wednesday: 8:00AM - 5:00 PM  Thursday: 8:00AM - 5:00 PM  Friday: 8:00AM - 5:00 PM    I am not in the office Mondays. Therefore non-urgent calls and medical messages received on Monday will be addressed when I am back in the office on Tuesday. Urgent matters will be reviewed and addressed by one of my partners in the office as needed.    If lab work was done today as part of your evaluation you will generally be contacted via [employer], mail, or phone with the results within 1-5 days. If there is an alarming result we will contact you by phone. Lab results come back at varying times, I generally wait until all lab results are available before making comments on the results.     If you need refills please contact your pharmacy. They will send a refill request to me to review. Please allow 3-5 business days for us to process all refill requests.     My Clinical Assistant is Taylor. Please call us at [phone] or send a medical message with any questions or concerns.

## 2021-06-24 NOTE — ANESTHESIA PROCEDURE NOTES
Central line    Start time: 2/20/2019 7:44 AM  End time: 2/20/2019 7:52 AM  Patient location: OR Post-induction  Indications: central pressure monitoring and vascular access  Performing Anesthesiologist: Marylu Pierce MD  Pre-procedure Checklist  Completed: patient identified, site marked, risks, benefits, and alternatives discussed, timeout performed, consent obtained, hand hygiene performed, all elements of maximal sterile barriers used including cap, mask, gown, sterile gloves, and large sheet and skin prep agent completely dried prior to procedure    Procedure Details:  Preparation: 2% chlorhexidine  Location details: right internal jugular  Site selection rationale: widely patent upon ultrasound examination  Catheter type: Introducer with Selkirk-Cm  Introducer type: MAC  Lumens:double lumenWedged at: 49   Withdrawn and locked at: 47Number of attempts: 1  Ultrasound evaluation of access site: yes  Vessel patent by US exam  Concurrent real time visualization of needle entry  ultrasound permanent image saved  Transduced for venous waveform  manometry confirmation of venous access    Post-procedure:   line sutured and Antimicrobial disks with CHG applied  Assessment: blood return through all ports and free fluid flow  Complications: none

## 2021-06-24 NOTE — PROGRESS NOTES
Chief Complaint   Patient presents with     Hospital Visit Follow Up     Had a quadruple bypass a couple of days ago. From 2/18/19 to 2/25/19.      Arm Pain     Left arm pain and numbness. Started in hospital but last night it woke him up form sleep.      Establish Care     HPI: Patient presents today following his hospital admission from 12/17/19-2/25/19.  He presented with complaints of chest pain along with radiation down both his arms and associated shortness of breath.  Coronary angiogram performed on 2/18/19 showed severe three-vessel disease. Initially there was plans to attempt stenting, but given the severity of his multivessel disease, CABG was preferred.  He underwent four-vessel CABG on 2/28/19 and was subsequently transferred to the ICU.  Recovery went as expected.  Initial postoperative anemia slowly recovered by the time of discharge.  Electrolytes stayed stable throughout.  He was given prescriptions for Zetia, metoprolol, rosuvastatin, and clopidogrel.  Notes mentioned being on clopidogrel for at least a year.  He has a plan follow-up with cardiovascular surgery on 3/26/19 and cardiology on 4/12/19.  He meets with cardiac rehab early next week.    The patient notes that during his hospital stay after his procedure and since discharge home he has been having intermittent issues where he has pain running down the left side of his arm and intermittent numbness and tingling.  It seems to be worse with positional changes, particularly laying on his back.  No weakness in the left extremity or clumsiness.  Atraumatic.  No nausea, diaphoresis, shortness of breath, chest palpitations, chest pain, lightheadedness, dizziness.    His bowels have slowly been becoming more regular.  They are always soft.  No drainage or spreading erythema from his incision.  He is washing it daily with the antibacterial soap.  Afebrile.      ROS:Review of Systems - History obtained from the patient  General ROS:  negative  Psychological ROS: negative  Hematological and Lymphatic ROS: negative  Endocrine ROS: negative  Respiratory ROS: negative  Cardiovascular ROS: negative  Gastrointestinal ROS: negative  Genito-Urinary ROS: negative  Musculoskeletal ROS: negative  Neurological ROS: positive for - numbness/tingling  Dermatological ROS: negative    SH: The Patient's  reports that  has never smoked. he has never used smokeless tobacco. He reports that he does not drink alcohol or use drugs.      FH: The Patient's family history includes Heart attack (age of onset: 42) in his father; Heart disease in his paternal grandfather; Hyperlipidemia in his sister.     Meds:    Current Outpatient Medications on File Prior to Visit   Medication Sig Dispense Refill     acetaminophen (TYLENOL) 325 MG tablet Take 2 tablets (650 mg total) by mouth every 6 (six) hours as needed for pain.  0     aspirin 81 mg chewable tablet Chew 81 mg daily.       clopidogrel (PLAVIX) 75 mg tablet Take 1 tablet (75 mg total) by mouth daily. 30 tablet 11     docusate sodium (COLACE) 100 MG capsule Take 1 capsule (100 mg total) by mouth 2 (two) times a day.  0     ezetimibe (ZETIA) 10 mg tablet Take 1 tablet (10 mg total) by mouth daily. 90 tablet 1     metoprolol tartrate (LOPRESSOR) 50 MG tablet Take 1 tablet (50 mg total) by mouth 2 (two) times a day. 60 tablet 3     omeprazole (PRILOSEC) 20 MG capsule Take 1 capsule (20 mg total) by mouth daily before breakfast. 14 capsule 0     polyethylene glycol (MIRALAX) 17 gram packet Take 1 packet (17 g total) by mouth daily for 10 days.  0     rosuvastatin (CRESTOR) 40 MG tablet Take 1 tablet (40 mg total) by mouth daily. 90 tablet 1     sodium phosphates 133 mL (FOR FLEET) 19-7 gram/118 mL Enem rectal enema As needed  0     traMADol (ULTRAM) 50 mg tablet Take 1 tablet (50 mg total) by mouth every 6 (six) hours as needed. 12 tablet 0     No current facility-administered medications on file prior to visit.   "      O:  /80   Pulse 91   Temp 97.7  F (36.5  C) (Oral)   Ht 5' 6.25\" (1.683 m)   Wt 158 lb (71.7 kg)   SpO2 97%   BMI 25.31 kg/m      Physical Examination:   General appearance - alert, well appearing, and in no distress  Mental status - alert, oriented to person, place, and time  Eyes - pupils equal and reactive, extraocular eye movements intact  Ears - bilateral TM's and external ear canals normal  Nose - normal and patent, no erythema, discharge or polyps  Mouth - mucous membranes moist, pharynx normal without lesions  Neck - supple, no significant adenopathy  Lymphatics - no palpable lymphadenopathy, no hepatosplenomegaly  Chest - clear to auscultation, no wheezes, rales or rhonchi, symmetric air entry  Heart - normal rate and regular rhythm, S1 and S2 normal, no murmurs noted  Abdomen - soft, nontender, nondistended, no masses or organomegaly  Neurological - alert, oriented, normal speech, no focal findings or movement disorder noted, neck supple without rigidity, cranial nerves II through XII intact, motor and sensory grossly normal bilaterally, normal muscle tone, no tremors, strength 5/5  Musculoskeletal - no joint tenderness, deformity or swelling  Extremities - peripheral pulses normal, no pedal edema, no clubbing or cyanosis  Skin - normal coloration and turgor, no rashes, no suspicious skin lesions noted.  Incision clean dry and intact.  No dehiscence.  No drainage.  Slightly pink without erythema.  Not warm to the touch.      A/P:     Problem List Items Addressed This Visit        ENT/CARD/PULM/ENDO Problems    Mixed hyperlipidemia    Hypertension    Non-ST elevation myocardial infarction (NSTEMI) (H) - Primary       Other    S/P CABG (coronary artery bypass graft)      Other Visit Diagnoses     Counseling on health care directive                1. Non-ST elevation myocardial infarction (NSTEMI) (H)  Status post four-vessel CABG.  Doing well.    2. S/P CABG (coronary artery bypass " graft)  Healing well.  Appropriate follow-up as planned.  Suspect left arm numbness is related to a possible brachial plexus injury and nerve impingement.  No signs of cardiac disease on exam and interview.  The patient does admit that the symptoms have resolved today and actually had been getting slowly better.  Continue to monitor.  If symptoms return, inform cardiothoracic surgery.    3. Mixed hyperlipidemia  On statin therapy.    4. Hypertension  Well-controlled.    5. Counseling on health care directive        Shawn Lee, CNP

## 2021-06-24 NOTE — ANESTHESIA PROCEDURE NOTES
Arterial Line  Reason for Procedure: hemodynamic monitoring  Patient location during procedure: OR pre-induction  Start time: 2/20/2019 7:21 AM  End time: 2/20/2019 7:30 AM  Staffing:  Performing  Anesthesiologist: Marylu Pierce MD  Performing CRNA: Katie Servin CRNA  Sterile Precautions:  sterile barriers used during insertion: cap, mask, sterile gloves, large sheet, and hand hygiene used.  Arterial Line:   Immediately prior to procedure a time out was called to verify the correct patient, procedure, equipment, support staff and site/side marked as required  Laterality: right  Location: brachial  Prepped with: ChloroPrep    Needle gauge: 20 G  Number of Attempts: 1  Secured with: tape, transparent dressing and pressure dressing  Flushed with: saline  1% lidocaine local anesthesia used for skin prep.   See MAR for additional medications given.  Ultrasound evaluation of access site: yes  Vessel patent by US exam    Concurrent real time visualization of needle entry  Permanent ultrasound image captured  Additional Notes:  Radial site too small on ultrasound examination.  Brachial widely patent.  Facile placement, patient tolerated well and without complication.

## 2021-06-24 NOTE — PROGRESS NOTES
Eleuterio Camejo has participated in 1 sessions of Phase II Cardiac Rehab.    Progress Report: [facility] with 5 beat complex arrythmia  Cardiac Rehab Treatment Progress Report 3/4/2019   Weight 161 lbs 3 oz   Pre Exercise  HR 87   Pre Exercise /78   Heart Rate 87   Post Exercise /68   ECG SR with 5 beat wide complex arrythmia at rest-no cv s/s with ectopy (see EKG scanned into encounter). Rare PVC's, Couplet and Triplet seen.   Total Exercise Minutes 6   Current Status:  Currently exercising without complaints or symptoms.  No cv s/s with exercise. Pt has baseline Chest incisional pain 3/10 and 6/10 Left arm pain and tingling since surgery-no increase in pain with ex.   Therapists Comments: Pt had a 5 beat complex arrythmia without cv s/s at rest before 6' walk test. Pt's exercise HR: 103-104bpm /72 and EKG showed SR/ST with rare PVC's, Couplets and Triplet during ex-no s/s.   If Physician recommends change in treatment plan, please place orders.        __________________________________________________      _____________  Signature                                                                                                  Date

## 2021-06-24 NOTE — TELEPHONE ENCOUNTER
Detailed message left on pt's voicemail. Pt was advised to call back and speak with scheduling line to set up appointment.  H.DeGree, CMA

## 2021-06-24 NOTE — TELEPHONE ENCOUNTER
New Appointment Needed  What is the reason for the visit:  NEW PATIENT/INF  Inpatient/ED Follow Up Appt Request  At what hospital or facility were you seen?: [facility]  What is the reason you were seen?: CORONARY ARTERY BYPASS GRAFT  What date were you admitted?: date: 2/18/19  What date were you discharged?: date: 2/25/19  What was the recommended timeframe for your follow up appointment?: 3-5 BUSINESS DAYS   Provider Preference: Any available  How soon do you need to be seen?: next week  Waitlist offered?: no  Okay to leave a detailed message:  Yes

## 2021-06-28 NOTE — PROGRESS NOTES
"Progress Notes by Mehran Lozada MD (Ted) at 3/13/2020 11:10 AM     Author: Mehran Lozada MD (Ted) Service: -- Author Type: Physician    Filed: 3/13/2020 11:33 AM Encounter Date: 3/13/2020 Status: Signed    : Mehran Lozada MD (Ted) (Physician)           Cardiology Progress Note    Assessment:  Coronary artery disease status post recent non-ST segment elevation myocardial infarction and coronary artery bypass graft surgery(LIMA to LAD and vein grafts to diagonal branch, obtuse marginal 3 and RPDA) in February 2019, stable, no angina  Hypercholesterolemia, excellent control on maximal dose of Crestor, Zetia and Repatha  Hypertension, good control      Plan:  He can stop taking Plavix     Continue rest of cardiac medications with no changes      Follow-up in 1 year    Subjective:   This is 64 y.o. male who comes in today for follow-up visit.  He reports no new cardiac symptoms.  He went back to his regular physical activities including bicycle riding and skiing in wintertime.  He denies any excess incisional chest discomfort.  He has not had heart palpitations or syncope.    Review of Systems:   General: WNL  Eyes: WNL  Ears/Nose/Throat: WNL  Lungs: WNL  Heart: WNL  Stomach: WNL  Bladder: WNL  Muscle/Joints: WNL  Skin: WNL  Nervous System: WNL  Mental Health: WNL     Blood: WNL    Objective:   /86 (Patient Site: Right Arm, Patient Position: Sitting, Cuff Size: Adult Regular)   Pulse 64   Resp 16   Ht 5' 6.25\" (1.683 m)   Wt 171 lb (77.6 kg)   BMI 27.39 kg/m    Physical Exam:  GENERAL: no distress  NECK: No JVD  LUNGS: Clear to auscultation.  CARDIAC: regular rhythm, S1 & S2 normal.  No heaves, thrills, gallops or murmurs.  ABDOMEN: flat, negative hepatosplenomegaly, soft and non-tender.  EXTREMITIES: No evidence of cyanosis, clubbing or edema.    Current Outpatient Medications   Medication Sig Dispense Refill   ? aspirin 81 mg chewable tablet Chew 81 mg daily.   "   ? evolocumab 140 mg/mL PnIj Inject 140 mg under the skin every 14 (fourteen) days. 2 Syringe 5   ? ezetimibe (ZETIA) 10 mg tablet TAKE 1 TABLET DAILY 90 tablet 1   ? metoprolol tartrate (LOPRESSOR) 50 MG tablet TAKE 1 TABLET BY MOUTH TWICE A  tablet 0   ? acetaminophen (TYLENOL) 325 MG tablet Take 2 tablets (650 mg total) by mouth every 6 (six) hours as needed for pain.  0   ? lisinopril (PRINIVIL,ZESTRIL) 10 MG tablet TAKE 1 TABLET DAILY 90 tablet 1   ? rosuvastatin (CRESTOR) 40 MG tablet TAKE 1 TABLET DAILY 90 tablet 1     No current facility-administered medications for this visit.        Cardiographics:    Echocardiogram: February 2019    Left ventricle ejection fraction is normal. The calculated left ventricular ejection fraction is 66%.    Normal left ventricular size and systolic function.    Normal right ventricular size and systolic function.    Mild mitral regurgitation        Coronary angio: February 2019  LM normal  LAD mod to severe dz in prox LAD; severe dz in mid with JANEL 2 flow into LAD with collaterals via RCA; first diagonal stent 100%  with collaterals  Circ OM3 small 100% with collaterals  RCA mild to mod diffuse dz with stent in %  with collaterals via LAD     LVEDP 11mmHg    Lab Results:       Lab Results   Component Value Date    CHOL 84 07/25/2019    CHOL 141 02/18/2019    CHOL 186 03/30/2018     Lab Results   Component Value Date    HDL 38 (L) 07/25/2019    HDL 32 (L) 02/18/2019    HDL 39 (L) 03/30/2018     Lab Results   Component Value Date    LDLCALC 14 07/25/2019    LDLCALC 85 02/18/2019    LDLCALC 105 03/30/2018     Lab Results   Component Value Date    TRIG 159 (H) 07/25/2019    TRIG 118 02/18/2019    TRIG 212 (H) 03/30/2018     No results found for: BNP    Mehran (Michael)  MD Neville

## 2021-06-30 NOTE — PROGRESS NOTES
"Progress Notes by Mehran Lozada MD (Ted) at 4/9/2021  8:30 AM     Author: Mehran Lozada MD (Ted) Service: -- Author Type: Physician    Filed: 4/9/2021  9:32 AM Encounter Date: 4/9/2021 Status: Signed    : Mehran Lozada MD (Ted) (Physician)           Cardiology Progress Note    Assessment:  Coronary artery disease, status post non-ST segment elevation myocardial infarction and coronary artery bypass graft surgery(LIMA to LAD and vein grafts to diagonal branch, obtuse marginal 3 and RPDA) in February 2019, stable, no angina  Hypercholesterolemia, on Crestor, Zetia, Praluenta  Hypertension, uncertain control      Plan:  I asked him to acquire blood pressure cuff and start checking blood pressure at home. He should call me with blood pressure readings in about 3 weeks. We'll decide if we need to adjust his blood pressure medications.     Lipid profile today    We discussed secondary prevention of cardiac events including medication adherence, regular exercise and diet    Follow-up in 1 year    Subjective:   This is 65 y.o. male who comes in today for routine follow-up visit. He has done well. He denies chest pain or shortness of breath. He has been physically active. He has not had weight gain, PND, orthopnea. He denies heart palpitations or syncope. He is compliant with all his cardiac medications.    Review of Systems:   General: WNL  Eyes: WNL  Ears/Nose/Throat: WNL  Lungs: WNL  Heart: WNL  Stomach: WNL  Bladder: WNL  Muscle/Joints: WNL  Skin: WNL  Nervous System: WNL  Mental Health: WNL     Blood: WNL    Objective:   /90 (Patient Site: Right Arm, Patient Position: Sitting, Cuff Size: Adult Regular)   Pulse 69   Resp 14   Ht 5' 6.25\" (1.683 m)   Wt 173 lb (78.5 kg)   BMI 27.71 kg/m    Physical Exam:  GENERAL: no distress  NECK: No JVD  LUNGS: Clear to auscultation.  CARDIAC: regular rhythm, S1 & S2 normal.  No heaves, thrills, gallops or murmurs.  ABDOMEN: " flat, negative hepatosplenomegaly, soft and non-tender.  EXTREMITIES: No evidence of cyanosis, clubbing or edema.    Current Outpatient Medications   Medication Sig Dispense Refill   ? alirocumab 75 mg/mL PnIj Inject 75 mg under the skin every 14 (fourteen) days. 6 Syringe 1   ? aspirin 81 mg chewable tablet Chew 81 mg daily.     ? ezetimibe (ZETIA) 10 mg tablet TAKE 1 TABLET DAILY 90 tablet 0   ? metoprolol tartrate (LOPRESSOR) 50 MG tablet TAKE 1 TABLET BY MOUTH TWICE A  tablet 1   ? rosuvastatin (CRESTOR) 40 MG tablet TAKE 1 TABLET DAILY 90 tablet 0   ? acetaminophen (TYLENOL) 325 MG tablet Take 2 tablets (650 mg total) by mouth every 6 (six) hours as needed for pain.  0   ? lisinopril (PRINIVIL,ZESTRIL) 10 MG tablet TAKE 1 TABLET DAILY 90 tablet 1     No current facility-administered medications for this visit.        Cardiographics:    Echocardiogram: February 2019    Left ventricle ejection fraction is normal. The calculated left ventricular ejection fraction is 66%.    Normal left ventricular size and systolic function.    Normal right ventricular size and systolic function.    Mild mitral regurgitation        Coronary angio: February 2019  LM normal  LAD mod to severe dz in prox LAD; severe dz in mid with JANEL 2 flow into LAD with collaterals via RCA; first diagonal stent 100%  with collaterals  Circ OM3 small 100% with collaterals  RCA mild to mod diffuse dz with stent in %  with collaterals via LAD     LVEDP 11mmHg    Lab Results:       Lab Results   Component Value Date    CHOL 84 07/25/2019    CHOL 141 02/18/2019    CHOL 186 03/30/2018     Lab Results   Component Value Date    HDL 38 (L) 07/25/2019    HDL 32 (L) 02/18/2019    HDL 39 (L) 03/30/2018     Lab Results   Component Value Date    LDLCALC 14 07/25/2019    LDLCALC 85 02/18/2019    LDLCALC 105 03/30/2018     Lab Results   Component Value Date    TRIG 159 (H) 07/25/2019    TRIG 118 02/18/2019    TRIG 212 (H) 03/30/2018     No  results found for: HERMELINDO Lozada MD (Ted)

## 2021-07-02 ENCOUNTER — RECORDS - HEALTHEAST (OUTPATIENT)
Dept: ADMINISTRATIVE | Facility: OTHER | Age: 66
End: 2021-07-02

## 2021-07-02 DIAGNOSIS — Z95.1 S/P CABG (CORONARY ARTERY BYPASS GRAFT): ICD-10-CM

## 2021-07-02 RX ORDER — METOPROLOL TARTRATE 50 MG
TABLET ORAL
Qty: 180 TABLET | Refills: 1 | Status: SHIPPED | OUTPATIENT
Start: 2021-07-02 | End: 2023-05-11

## 2021-07-14 PROBLEM — I25.10 CORONARY ARTERY DISEASE: Status: RESOLVED | Noted: 2019-02-18 | Resolved: 2019-05-23

## 2021-07-14 PROBLEM — I21.4 ACUTE NON Q WAVE MI (MYOCARDIAL INFARCTION), INITIAL EPISODE OF CARE (H): Status: RESOLVED | Noted: 2019-02-18 | Resolved: 2019-05-23

## 2021-07-14 PROBLEM — J96.00 ARF (ACUTE RESPIRATORY FAILURE) (H): Status: RESOLVED | Noted: 2019-02-20 | Resolved: 2019-05-23

## 2021-09-13 DIAGNOSIS — E78.5 HYPERLIPIDEMIA: ICD-10-CM

## 2021-09-13 RX ORDER — EZETIMIBE 10 MG/1
10 TABLET ORAL DAILY
Qty: 90 TABLET | Refills: 1 | Status: SHIPPED | OUTPATIENT
Start: 2021-09-13 | End: 2022-03-15

## 2021-09-13 RX ORDER — ROSUVASTATIN CALCIUM 40 MG/1
40 TABLET, COATED ORAL DAILY
Qty: 90 TABLET | Refills: 1 | Status: SHIPPED | OUTPATIENT
Start: 2021-09-13 | End: 2022-03-15

## 2021-09-26 ENCOUNTER — HEALTH MAINTENANCE LETTER (OUTPATIENT)
Age: 66
End: 2021-09-26

## 2021-10-18 ENCOUNTER — TRANSFERRED RECORDS (OUTPATIENT)
Dept: HEALTH INFORMATION MANAGEMENT | Facility: CLINIC | Age: 66
End: 2021-10-18

## 2021-11-08 DIAGNOSIS — E78.01 FAMILIAL HYPERCHOLESTEROLEMIA: Primary | ICD-10-CM

## 2021-11-10 ENCOUNTER — TELEPHONE (OUTPATIENT)
Dept: CARDIOLOGY | Facility: CLINIC | Age: 66
End: 2021-11-10

## 2021-11-10 DIAGNOSIS — E78.01 FAMILIAL HYPERCHOLESTEROLEMIA: ICD-10-CM

## 2021-11-10 NOTE — TELEPHONE ENCOUNTER
Patient is eligible to fill with [facility].  Patient currently filling with local [facility] pharmacy and prefers to stay with local retail pharmacy.

## 2021-12-27 DIAGNOSIS — Z95.1 S/P CABG (CORONARY ARTERY BYPASS GRAFT): ICD-10-CM

## 2021-12-27 RX ORDER — METOPROLOL TARTRATE 50 MG
50 TABLET ORAL 2 TIMES DAILY
Qty: 180 TABLET | Refills: 1 | Status: SHIPPED | OUTPATIENT
Start: 2021-12-27 | End: 2022-06-22

## 2022-01-16 ENCOUNTER — HEALTH MAINTENANCE LETTER (OUTPATIENT)
Age: 67
End: 2022-01-16

## 2022-01-31 ENCOUNTER — TELEPHONE (OUTPATIENT)
Dept: CARDIOLOGY | Facility: CLINIC | Age: 67
End: 2022-01-31
Payer: COMMERCIAL

## 2022-01-31 NOTE — TELEPHONE ENCOUNTER
Central Prior Authorization Team   Phone: [phone]      Question set received from patient's insurance for the patient's alirocumab (PRALUENT) 75 MG/ML injectable pen, it has been placed in the G FOLDER for completion.

## 2022-01-31 NOTE — TELEPHONE ENCOUNTER
Central Prior Authorization Team  Phone: [phone]    PA Initiation    Medication: alirocumab (PRALUENT) 75 MG/ML injectable pen  Insurance Company: [employer] - Phone [phone] Fax [phone]  Pharm[facility] Filling the [id number] [place] [facility] [place], [place] - [address]  Filling Pharmacy Phone: [phone]  Filling Pharmacy Fax:    Start Date: 1/31/2022

## 2022-02-02 NOTE — TELEPHONE ENCOUNTER
Prior Authorization Approval    Authorization Effective Date: 2/1/2022  Authorization Expiration Date: 2/1/2023  Medication: alirocumab (PRALUENT) 75 MG/ML injectable pen- APPROVED   Approved Dose/Quantity:   Reference #:     Insurance Company: [employer] - Phone [phone] Fax [phone]  Expected CoPay:       CoPay Card Available:      Foundation Assistance Needed:    Which Pharmacy is filling the prescription (Not needed for infusion/clinic administered): [id number] [place] [address] [place]  Pharmacy Notified: Yes  Patient Notified:  **Instructed pharmacy to notify patient when script is ready to /ship.**

## 2022-02-03 ENCOUNTER — OFFICE VISIT (OUTPATIENT)
Dept: FAMILY MEDICINE | Facility: CLINIC | Age: 67
End: 2022-02-03
Payer: COMMERCIAL

## 2022-02-03 VITALS
HEART RATE: 78 BPM | SYSTOLIC BLOOD PRESSURE: 128 MMHG | BODY MASS INDEX: 26.06 KG/M2 | OXYGEN SATURATION: 100 % | DIASTOLIC BLOOD PRESSURE: 80 MMHG | WEIGHT: 166 LBS | HEIGHT: 67 IN

## 2022-02-03 DIAGNOSIS — H69.92 DYSFUNCTION OF LEFT EUSTACHIAN TUBE: Primary | ICD-10-CM

## 2022-02-03 PROCEDURE — 99213 OFFICE O/P EST LOW 20 MIN: CPT | Performed by: NURSE PRACTITIONER

## 2022-02-03 RX ORDER — FLUTICASONE PROPIONATE 50 MCG
1 SPRAY, SUSPENSION (ML) NASAL DAILY
Qty: 16 G | Refills: 1 | Status: SHIPPED | OUTPATIENT
Start: 2022-02-03 | End: 2022-04-29

## 2022-02-03 ASSESSMENT — MIFFLIN-ST. JEOR: SCORE: 1488.66

## 2022-02-03 NOTE — PROGRESS NOTES
"Chief Complaint   Patient presents with     Otalgia     Having left ear pain for about 2 weeks now. Thinks that he may have an ear infection. Pain does go into the left jaw.        HPI: Patient presents today with 2 weeks of left ear discomfort, muffled hearing, and intermittent pain at the temporomandibular joint. Atraumatic. Teeth feel fine. Afebrile without chills. No recent illness. Right side of the face feels fine. No chest pain, shoulder pain, diaphoresis, nausea, fatigue, chest pain. Eating chewy foods and hard foods are more uncomfortable. Notes a little bit of clicking in the jaw.    ROS:Review of Systems - negative except for what's listed in the HPI    SH: The Patient's  reports that he has never smoked. He has never used smokeless tobacco. He reports that he does not drink alcohol and does not use drugs.      FH: The Patient's family history includes Coronary Artery Disease (age of onset: 42.00) in his father; Heart Disease in his paternal grandfather; Hyperlipidemia in his sister.     Meds:    Current Outpatient Medications   Medication     alirocumab (PRALUENT) 75 MG/ML injectable pen     aspirin 81 mg chewable tablet     ezetimibe (ZETIA) 10 MG tablet     fluticasone (FLONASE) 50 MCG/ACT nasal spray     lisinopriL (PRINIVIL,ZESTRIL) 10 MG tablet     metoprolol tartrate (LOPRESSOR) 50 MG tablet     rosuvastatin (CRESTOR) 40 MG tablet     ezetimibe (ZETIA) 10 mg tablet     metoprolol tartrate (LOPRESSOR) 50 MG tablet     No current facility-administered medications for this visit.       O:  /80   Pulse 78   Ht 1.689 m (5' 6.5\")   Wt 75.3 kg (166 lb)   SpO2 100%   BMI 26.39 kg/m      Physical Examination:   General appearance - alert, well appearing, and in no distress  Mental status - alert, oriented to person, place, and time  Eyes -PERRLA  Ears - bilateral TM's and external ear canals normal  Nose - normal and patent, no erythema, discharge or polyps  Mouth - mucous membranes moist. No " oral lesions.  Neck - no significant adenopathy  Lymphatics - no palpable lymphadenopathy  Chest - clear to auscultation, no wheezes, rales or rhonchi, symmetric air entry  Heart - normal rate and regular rhythm, S1 and S2 normal, no murmurs noted  Neurological -grossly intact  Musculoskeletal -faint crepitus sensation along the left temporomandibular joint. Right smooth.  A/P:       ICD-10-CM    1. Dysfunction of left eustachian tube  H69.82 fluticasone (FLONASE) 50 MCG/ACT nasal spray       Dysfunction of left eustachian tube  Eustachian tube dysfunction versus temporomandibular joint issues. As needed over-the-counter pain medication. Try to focus on chewing soft foods. Add in fluticasone nasal spray. Let me know in 4 weeks if not improving. No signs to suggest jaw pain is related to cardiovascular disease.    - fluticasone (FLONASE) 50 MCG/ACT nasal spray  Dispense: 16 g; Refill: 1        Shawn Lee CNP      This note has been dictated using voice recognition software. Any grammatical or context distortions are unintentional and inherent to the software.

## 2022-02-03 NOTE — PATIENT INSTRUCTIONS
I sent in that steroid nasal spray called fluticasone to your pharmacy.  You can use 2 squirts up each nostril daily.    It's okay to increase your ibuprofen to 2 tablets 2-3 times a day as needed as long as you are taking it with food.    Work on focusing on soft foods and avoid gum and hard candies.    If after 4 more weeks you are still having the crackling or pressure sensation, let me know and I can connect you with ENT to investigate other potential causes

## 2022-03-07 ENCOUNTER — MYC MEDICAL ADVICE (OUTPATIENT)
Dept: FAMILY MEDICINE | Facility: CLINIC | Age: 67
End: 2022-03-07
Payer: COMMERCIAL

## 2022-03-07 DIAGNOSIS — H91.92 DECREASED HEARING OF LEFT EAR: Primary | ICD-10-CM

## 2022-03-12 DIAGNOSIS — E78.5 HYPERLIPIDEMIA: ICD-10-CM

## 2022-03-15 RX ORDER — EZETIMIBE 10 MG/1
TABLET ORAL
Qty: 90 TABLET | Refills: 0 | Status: SHIPPED | OUTPATIENT
Start: 2022-03-15 | End: 2022-06-10

## 2022-03-15 RX ORDER — ROSUVASTATIN CALCIUM 40 MG/1
TABLET, COATED ORAL
Qty: 90 TABLET | Refills: 0 | Status: SHIPPED | OUTPATIENT
Start: 2022-03-15 | End: 2022-06-10

## 2022-04-14 ENCOUNTER — OFFICE VISIT (OUTPATIENT)
Dept: AUDIOLOGY | Facility: CLINIC | Age: 67
End: 2022-04-14
Payer: COMMERCIAL

## 2022-04-14 ENCOUNTER — OFFICE VISIT (OUTPATIENT)
Dept: OTOLARYNGOLOGY | Facility: CLINIC | Age: 67
End: 2022-04-14
Payer: COMMERCIAL

## 2022-04-14 DIAGNOSIS — H92.02 LEFT EAR PAIN: ICD-10-CM

## 2022-04-14 DIAGNOSIS — M26.622 ARTHRALGIA OF LEFT TEMPOROMANDIBULAR JOINT: Primary | ICD-10-CM

## 2022-04-14 DIAGNOSIS — Z77.122 HISTORY OF EXPOSURE TO NOISE: ICD-10-CM

## 2022-04-14 DIAGNOSIS — H91.92 DECREASED HEARING OF LEFT EAR: ICD-10-CM

## 2022-04-14 DIAGNOSIS — H90.A22 SENSORINEURAL HEARING LOSS (SNHL) OF LEFT EAR WITH RESTRICTED HEARING OF RIGHT EAR: Primary | ICD-10-CM

## 2022-04-14 DIAGNOSIS — H90.3 SENSORINEURAL HEARING LOSS (SNHL) OF BOTH EARS: ICD-10-CM

## 2022-04-14 PROCEDURE — 99204 OFFICE O/P NEW MOD 45 MIN: CPT | Performed by: OTOLARYNGOLOGY

## 2022-04-14 PROCEDURE — 92550 TYMPANOMETRY & REFLEX THRESH: CPT | Performed by: AUDIOLOGIST

## 2022-04-14 PROCEDURE — 92557 COMPREHENSIVE HEARING TEST: CPT | Performed by: AUDIOLOGIST

## 2022-04-14 PROCEDURE — 99207 PR NO CHARGE LOS: CPT | Performed by: AUDIOLOGIST

## 2022-04-14 NOTE — PROGRESS NOTES
Mri  CHIEF COMPLAINT:  Hearing Concern      HISTORY OF PRESENT ILLNESS    Hong was seen at the behest of Shawn Lee NP for hearing loss    Patient complains of left-sided ear pain and fullness.  The pain is sharp.  He also has a crunching sound in the left ear when he talks or chews.  He used to chew gum regularly but is recently stopped.  He denies any history of otologic surgery.  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  He has no balance concerns.  He recently had a dentist appointment and there was no concerns about abnormal wear.  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  I have been using the steroid nasal spray daily as directed and taking Ibuprofen as needed for pain.  There has been very little improvement.  I am still experiencing the crackling and some pressure sensation.  You told me to contact you in four weeks if things did not improve so we could explore the possibility of an ENT to investigate other potential causes.  I can be contacted any time during the day (9:00 AM - 5:00 PM).  Thank You  Eleuterio Camejo  [phone]  [email or web address]  ( 1955)       REVIEW OF SYSTEMS    Review of Systems as per HPI and PMHx, otherwise 10 system review system are negative.     Amoxicillin and Penicillins     There were no vitals taken for this visit.    HEAD: Normal appearance and symmetry:  No cutaneous lesions.      NECK:  supple     EARS: normal TM, EACs    EYES:  EOMI    CN VII/XII:  intact     NOSE:     Dorsum:   straight  Septum:  midline  Mucosa:  moist        ORAL CAVITY/OROPHARYNX:     Lips:  Normal.  Tongue: normal, midline  Mucosa:   no lesions     NECK:  Trachea:  midline.              Thyroid:  normal              Adenopathy:  none         NEURO:   Alert and Oriented     GAIT AND STATION:  normal     RESPIRATORY:   Symmetry and Respiratory effort     PSYCH:  Normal mood and affect     SKIN:   warm and dry     AUDIOGRAM: Consistent with prior noise exposure    IMPRESSION:    Encounter Diagnoses   Name Primary?     Decreased hearing of left ear      Arthralgia of left temporomandibular joint Yes     History of exposure to noise      Sensorineural hearing loss (SNHL) of both ears    Patient patient has audiogram is consistent with noise-induced hearing loss.  Recommend noise precautions and hearing aid consultation.  For TMJ crepitus we will proceed with MRI of the TM joints.  He may need a restoral surgery referral depending on the findings.  Also discussed conservative measures such as warm compresses massage and avoidance of chewing gum.       RECOMMENDATIONS:      Referral for hearing aid consult  MRI of TMJ  Conservative measures discussed

## 2022-04-14 NOTE — PROGRESS NOTES
AUDIOLOGY REPORT    SUMMARY: Audiology visit completed. See audiogram for results. Abuse screening not completed due to same day appt with ENT clinic, where this is addressed.      RECOMMENDATIONS: Follow-up with ENT.      Ade Obregon, [facility]  [place] Licensed Audiologist [id number]

## 2022-04-14 NOTE — LETTER
2022         RE: Eleuterio Camejo  [address] [address]        Dear Colleague,    Thank you for referring your patient, Eleuterio Camejo, to the [facility]. Please see a copy of my visit note below.    Mri  CHIEF COMPLAINT:  Hearing Concern      HISTORY OF PRESENT ILLNESS    Hong was seen at the behest of Shawn Lee NP for hearing loss    Patient complains of left-sided ear pain and fullness.  The pain is sharp.  He also has a crunching sound in the left ear when he talks or chews.  He used to chew gum regularly but is recently stopped.  He denies any history of otologic surgery.  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  He has no balance concerns.  He recently had a dentist appointment and there was no concerns about abnormal wear.  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  I have been using the steroid nasal spray daily as directed and taking Ibuprofen as needed for pain.  There has been very little improvement.  I am still experiencing the crackling and some pressure sensation.  You told me to contact you in four weeks if things did not improve so we could explore the possibility of an ENT to investigate other potential causes.  I can be contacted any time during the day (9:00 AM - 5:00 PM).  Thank You  Eleuterio Camejo  [phone]  [email or web address]  ( 1955)       REVIEW OF SYSTEMS    Review of Systems as per HPI and PMHx, otherwise 10 system review system are negative.     Amoxicillin and Penicillins     There were no vitals taken for this visit.    HEAD: Normal appearance and symmetry:  No cutaneous lesions.      NECK:  supple     EARS: normal TM, EACs    EYES:  EOMI    CN VII/XII:   intact     NOSE:     Dorsum:   straight  Septum:  midline  Mucosa:  moist        ORAL CAVITY/OROPHARYNX:     Lips:  Normal.  Tongue: normal, midline  Mucosa:   no lesions     NECK:  Trachea:  midline.              Thyroid:  normal              Adenopathy:  none        NEURO:   Alert and Oriented     GAIT AND STATION:  normal     RESPIRATORY:   Symmetry and Respiratory effort     PSYCH:  Normal mood and affect     SKIN:   warm and dry     AUDIOGRAM: Consistent with prior noise exposure    IMPRESSION:    Encounter Diagnoses   Name Primary?     Decreased hearing of left ear      Arthralgia of left temporomandibular joint Yes     History of exposure to noise      Sensorineural hearing loss (SNHL) of both ears    Patient patient has audiogram is consistent with noise-induced hearing loss.  Recommend noise precautions and hearing aid consultation.  For TMJ crepitus we will proceed with MRI of the TM joints.  He may need a restoral surgery referral depending on the findings.  Also discussed conservative measures such as warm compresses massage and avoidance of chewing gum.       RECOMMENDATIONS:      Referral for hearing aid consult  MRI of TMJ  Conservative measures discussed        Again, thank you for allowing me to participate in the care of your patient.        Sincerely,        Pranay Child MD

## 2022-04-27 DIAGNOSIS — H69.92 DYSFUNCTION OF LEFT EUSTACHIAN TUBE: ICD-10-CM

## 2022-04-29 RX ORDER — FLUTICASONE PROPIONATE 50 MCG
SPRAY, SUSPENSION (ML) NASAL
Qty: 32 ML | Refills: 1 | Status: SHIPPED | OUTPATIENT
Start: 2022-04-29 | End: 2022-05-23

## 2022-04-30 NOTE — TELEPHONE ENCOUNTER
"Last Written Prescription Date:  2/3/22  Last Fill Quantity: 16g,  # refills: 1   Last office visit provider:  2/3/22     Requested Prescriptions   Pending Prescriptions Disp Refills     fluticasone (FLONASE) 50 MCG/ACT nasal spray [Pharmacy Med Name: FLUTICASONE PROP 50 MCG SPRAY] 32 mL      Sig: INSTILL 1 SPRAY INTO BOTH NOSTRILS DAILY       Nasal Allergy Protocol Passed - 4/27/2022  7:31 AM        Passed - Patient is age 12 or older        Passed - Recent (12 mo) or future (30 days) visit within the authorizing provider's specialty     Patient has had an office visit with the authorizing provider or a provider within the authorizing providers department within the previous 12 mos or has a future within next 30 days. See \"Patient Info\" tab in inbasket, or \"Choose Columns\" in Meds & Orders section of the refill encounter.              Passed - Medication is active on med list             Lu Reis 04/29/22 9:35 PM    "

## 2022-05-06 ENCOUNTER — OFFICE VISIT (OUTPATIENT)
Dept: CARDIOLOGY | Facility: CLINIC | Age: 67
End: 2022-05-06
Payer: COMMERCIAL

## 2022-05-06 VITALS
RESPIRATION RATE: 18 BRPM | BODY MASS INDEX: 25.2 KG/M2 | SYSTOLIC BLOOD PRESSURE: 124 MMHG | OXYGEN SATURATION: 97 % | DIASTOLIC BLOOD PRESSURE: 84 MMHG | HEART RATE: 57 BPM | WEIGHT: 158.5 LBS

## 2022-05-06 DIAGNOSIS — I25.10 CORONARY ARTERY DISEASE INVOLVING NATIVE CORONARY ARTERY OF NATIVE HEART WITHOUT ANGINA PECTORIS: ICD-10-CM

## 2022-05-06 DIAGNOSIS — E78.01 FAMILIAL HYPERCHOLESTEROLEMIA: Primary | ICD-10-CM

## 2022-05-06 LAB
CHOLEST SERPL-MCNC: 98 MG/DL
FASTING STATUS PATIENT QL REPORTED: ABNORMAL
HDLC SERPL-MCNC: 39 MG/DL
LDLC SERPL CALC-MCNC: 29 MG/DL
TRIGL SERPL-MCNC: 150 MG/DL

## 2022-05-06 PROCEDURE — 99214 OFFICE O/P EST MOD 30 MIN: CPT | Performed by: INTERNAL MEDICINE

## 2022-05-06 PROCEDURE — 36415 COLL VENOUS BLD VENIPUNCTURE: CPT | Performed by: INTERNAL MEDICINE

## 2022-05-06 PROCEDURE — 80061 LIPID PANEL: CPT | Performed by: INTERNAL MEDICINE

## 2022-05-06 NOTE — PATIENT INSTRUCTIONS
Eleuterio Camejo,    It was a pleasure to see you today at the [facility].     My recommendations after this visit include:    Check lipids    CHANDAN Lozada MD, FACC, KAITLYN

## 2022-05-06 NOTE — LETTER
5/6/2022    Shawn Lee, NP  [address] [place] [address]    RE: Eleuterio Camejo       Dear Colleague,     I had the pleasure of seeing Eleuterio Camejo in the [facility].      Cardiology Progress Note     Assessment:  Coronary artery disease, status post non-ST segment elevation myocardial infarction and coronary artery bypass graft surgery(LIMA to LAD and vein grafts to diagonal branch, obtuse marginal 3 and RPDA) in February 2019, stable, no angina  Hypercholesterolemia, on Crestor, Zetia, Praluenta, excellent control  Hypertension, good control    Plan:  Continue current medications  Check lipid profile  Encouraged him to stay physically active    We discussed again secondary prevention of cardiac events.    Follow-up in 1 year    Subjective:   This is 66 year old male who comes in today for follow-up visit.  He has done well.  He has not had chest pain or shortness of breath.  He rides his bicycle several times a week.  He is planning to go on  bike riding trip to [place] later this spring.  He tolerates medication well.    Review of Systems:   Negative other than history of present illness    Objective:   /84 (BP Location: Left arm, Patient Position: Sitting, Cuff Size: Adult Regular)   Pulse 57   Resp 18   Wt 71.9 kg (158 lb 8 oz)   SpO2 97%   BMI 25.20 kg/m    Physical Exam:  GENERAL: no distress  NECK: No JVD  LUNGS: Clear to auscultation.  CARDIAC: regular rhythm, S1 & S2 normal.  No heaves, thrills, gallops or murmurs.  ABDOMEN: flat, negative hepatosplenomegaly, soft and non-tender.  EXTREMITIES: No evidence of cyanosis, clubbing or edema.    Current Outpatient Medications   Medication Sig Dispense Refill     alirocumab (PRALUENT) 75 MG/ML injectable pen Inject 1 mL (75 mg) Subcutaneous every 14 days 6 mL 1     aspirin 81 mg chewable tablet [ASPIRIN 81 MG CHEWABLE TABLET] Chew 81 mg daily.       ezetimibe (ZETIA) 10 mg tablet [EZETIMIBE (ZETIA) 10 MG TABLET]  TAKE 1 TABLET DAILY 90 tablet 0     fluticasone (FLONASE) 50 MCG/ACT nasal spray INSTILL 1 SPRAY INTO BOTH NOSTRILS DAILY 32 mL 1     lisinopril (ZESTRIL) 10 MG tablet TAKE 1 TABLET BY MOUTH EVERY DAY 90 tablet 1     metoprolol tartrate (LOPRESSOR) 50 MG tablet [METOPROLOL TARTRATE (LOPRESSOR) 50 MG TABLET] TAKE 1 TABLET BY MOUTH TWICE A  tablet 1     rosuvastatin (CRESTOR) 40 MG tablet TAKE 1 TABLET DAILY. 90 tablet 0     ezetimibe (ZETIA) 10 MG tablet TAKE 1 TABLET DAILY. (Patient not taking: Reported on 5/6/2022) 90 tablet 0     metoprolol tartrate (LOPRESSOR) 50 MG tablet Take 1 tablet (50 mg) by mouth 2 times daily (Patient not taking: Reported on 5/6/2022) 180 tablet 1       Cardiographics:    Echocardiogram: February 2019    Left ventricle ejection fraction is normal. The calculated left ventricular ejection fraction is 66%.    Normal left ventricular size and systolic function.    Normal right ventricular size and systolic function.    Mild mitral regurgitation        Coronary angio: February 2019  LM normal  LAD mod to severe dz in prox LAD; severe dz in mid with JANEL 2 flow into LAD with collaterals via RCA; first diagonal stent 100%  with collaterals  Circ OM3 small 100% with collaterals  RCA mild to mod diffuse dz with stent in %  with collaterals via LAD     LVEDP 11mmHg     Lab Results    Chemistry/lipid CBC Cardiac Enzymes/BNP/TSH/INR   Recent Labs   Lab Test 04/09/21  0900   CHOL 95   HDL 39*   LDL 14   TRIG 210*     Recent Labs   Lab Test 04/09/21  0900 07/25/19  1534 04/12/19  1108   LDL 14 14 128     Recent Labs   Lab Test 04/09/21  0900      POTASSIUM 3.8   CHLORIDE 108*   CO2 22   GLC 95   BUN 15   CR 0.93   GFRESTIMATED >60   DANGELO 8.8     Recent Labs   Lab Test 04/09/21  0900 02/23/19  0608 02/21/19  0357   CR 0.93 0.72 0.80     Recent Labs   Lab Test 02/19/19  0715   A1C 5.3          Recent Labs   Lab Test 04/09/21  0900   WBC 7.5   HGB 17.2   HCT 49.8   MCV 93   PLT  284     Recent Labs   Lab Test 04/09/21  0900 02/25/19  0554 02/23/19  0608   HGB 17.2 13.8* 12.7*    Recent Labs   Lab Test 02/18/19  0927 02/18/19  0324 02/17/19  2109   TROPONINI 4.53* 6.39* 4.39*     No results for input(s): BNP, NTBNPI, NTBNP in the last 88022 hours.  No results for input(s): TSH in the last 64461 hours.  Recent Labs   Lab Test 02/21/19  0357 02/20/19  1154 02/19/19  0715   INR 1.24* 1.28* 0.97                        Thank you for allowing me to participate in the care of your patient.      Sincerely,     Michael Lozada MD     [facility]  cc:   No referring provider defined for this encounter.

## 2022-05-06 NOTE — PROGRESS NOTES
Cardiology Progress Note     Assessment:  Coronary artery disease, status post non-ST segment elevation myocardial infarction and coronary artery bypass graft surgery(LIMA to LAD and vein grafts to diagonal branch, obtuse marginal 3 and RPDA) in February 2019, stable, no angina  Hypercholesterolemia, on Crestor, Zetia, Praluenta, excellent control  Hypertension, good control    Plan:  Continue current medications  Check lipid profile  Encouraged him to stay physically active    We discussed again secondary prevention of cardiac events.    Follow-up in 1 year    Subjective:   This is 66 year old male who comes in today for follow-up visit.  He has done well.  He has not had chest pain or shortness of breath.  He rides his bicycle several times a week.  He is planning to go on  bike riding trip to [place] later this spring.  He tolerates medication well.    Review of Systems:   Negative other than history of present illness    Objective:   /84 (BP Location: Left arm, Patient Position: Sitting, Cuff Size: Adult Regular)   Pulse 57   Resp 18   Wt 71.9 kg (158 lb 8 oz)   SpO2 97%   BMI 25.20 kg/m    Physical Exam:  GENERAL: no distress  NECK: No JVD  LUNGS: Clear to auscultation.  CARDIAC: regular rhythm, S1 & S2 normal.  No heaves, thrills, gallops or murmurs.  ABDOMEN: flat, negative hepatosplenomegaly, soft and non-tender.  EXTREMITIES: No evidence of cyanosis, clubbing or edema.    Current Outpatient Medications   Medication Sig Dispense Refill     alirocumab (PRALUENT) 75 MG/ML injectable pen Inject 1 mL (75 mg) Subcutaneous every 14 days 6 mL 1     aspirin 81 mg chewable tablet [ASPIRIN 81 MG CHEWABLE TABLET] Chew 81 mg daily.       ezetimibe (ZETIA) 10 mg tablet [EZETIMIBE (ZETIA) 10 MG TABLET] TAKE 1 TABLET DAILY 90 tablet 0     fluticasone (FLONASE) 50 MCG/ACT nasal spray INSTILL 1 SPRAY INTO BOTH NOSTRILS DAILY 32 mL 1     lisinopril (ZESTRIL) 10 MG tablet TAKE 1 TABLET BY MOUTH EVERY DAY 90 tablet  1     metoprolol tartrate (LOPRESSOR) 50 MG tablet [METOPROLOL TARTRATE (LOPRESSOR) 50 MG TABLET] TAKE 1 TABLET BY MOUTH TWICE A  tablet 1     rosuvastatin (CRESTOR) 40 MG tablet TAKE 1 TABLET DAILY. 90 tablet 0     ezetimibe (ZETIA) 10 MG tablet TAKE 1 TABLET DAILY. (Patient not taking: Reported on 5/6/2022) 90 tablet 0     metoprolol tartrate (LOPRESSOR) 50 MG tablet Take 1 tablet (50 mg) by mouth 2 times daily (Patient not taking: Reported on 5/6/2022) 180 tablet 1       Cardiographics:    Echocardiogram: February 2019    Left ventricle ejection fraction is normal. The calculated left ventricular ejection fraction is 66%.    Normal left ventricular size and systolic function.    Normal right ventricular size and systolic function.    Mild mitral regurgitation        Coronary angio: February 2019  LM normal  LAD mod to severe dz in prox LAD; severe dz in mid with JANEL 2 flow into LAD with collaterals via RCA; first diagonal stent 100%  with collaterals  Circ OM3 small 100% with collaterals  RCA mild to mod diffuse dz with stent in %  with collaterals via LAD     LVEDP 11mmHg     Lab Results    Chemistry/lipid CBC Cardiac Enzymes/BNP/TSH/INR   Recent Labs   Lab Test 04/09/21  0900   CHOL 95   HDL 39*   LDL 14   TRIG 210*     Recent Labs   Lab Test 04/09/21  0900 07/25/19  1534 04/12/19  1108   LDL 14 14 128     Recent Labs   Lab Test 04/09/21  0900      POTASSIUM 3.8   CHLORIDE 108*   CO2 22   GLC 95   BUN 15   CR 0.93   GFRESTIMATED >60   DANGELO 8.8     Recent Labs   Lab Test 04/09/21  0900 02/23/19  0608 02/21/19  0357   CR 0.93 0.72 0.80     Recent Labs   Lab Test 02/19/19  0715   A1C 5.3          Recent Labs   Lab Test 04/09/21  0900   WBC 7.5   HGB 17.2   HCT 49.8   MCV 93        Recent Labs   Lab Test 04/09/21  0900 02/25/19  0554 02/23/19  0608   HGB 17.2 13.8* 12.7*    Recent Labs   Lab Test 02/18/19  0927 02/18/19  0324 02/17/19  2109   TROPONINI 4.53* 6.39* 4.39*     No  results for input(s): BNP, NTBNPI, NTBNP in the last 64315 hours.  No results for input(s): TSH in the last 51380 hours.  Recent Labs   Lab Test 02/21/19  0357 02/20/19  1154 02/19/19  0715   INR 1.24* 1.28* 0.97

## 2022-05-22 DIAGNOSIS — H69.92 DYSFUNCTION OF LEFT EUSTACHIAN TUBE: ICD-10-CM

## 2022-05-23 RX ORDER — FLUTICASONE PROPIONATE 50 MCG
SPRAY, SUSPENSION (ML) NASAL
Qty: 32 ML | Refills: 3 | Status: SHIPPED | OUTPATIENT
Start: 2022-05-23 | End: 2023-08-30

## 2022-05-23 NOTE — TELEPHONE ENCOUNTER
"Last Written Prescription Date:  4/29/22  Last Fill Quantity: 32,  # refills: 1   Last office visit provider:  2/3/22     Requested Prescriptions   Pending Prescriptions Disp Refills     fluticasone (FLONASE) 50 MCG/ACT nasal spray [Pharmacy Med Name: FLUTICASONE PROP 50 MCG SPRAY] 16 mL 3     Sig: INSTILL 1 SPRAY INTO BOTH NOSTRILS DAILY       Nasal Allergy Protocol Passed - 5/22/2022 11:31 AM        Passed - Patient is age 12 or older        Passed - Recent (12 mo) or future (30 days) visit within the authorizing provider's specialty     Patient has had an office visit with the authorizing provider or a provider within the authorizing providers department within the previous 12 mos or has a future within next 30 days. See \"Patient Info\" tab in inbasket, or \"Choose Columns\" in Meds & Orders section of the refill encounter.              Passed - Medication is active on med list             Fadia Will RN 05/23/22 10:42 AM  "

## 2022-06-27 ENCOUNTER — OFFICE VISIT (OUTPATIENT)
Dept: AUDIOLOGY | Facility: CLINIC | Age: 67
End: 2022-06-27
Attending: OTOLARYNGOLOGY
Payer: COMMERCIAL

## 2022-06-27 DIAGNOSIS — H90.3 SENSORINEURAL HEARING LOSS (SNHL) OF BOTH EARS: ICD-10-CM

## 2022-06-27 DIAGNOSIS — H90.A22 SENSORINEURAL HEARING LOSS (SNHL) OF LEFT EAR WITH RESTRICTED HEARING OF RIGHT EAR: Primary | ICD-10-CM

## 2022-06-27 PROCEDURE — 92591 PR HEARING AID EXAM BINAURAL: CPT | Performed by: AUDIOLOGIST

## 2022-06-28 NOTE — PROGRESS NOTES
AUDIOLOGY REPORT    SUBJECTIVE: Eleuterio Camejo is a 66 year old male was seen in the Audiology Clinic at  [facility] on 6/27/22 to discuss concerns with hearing and functional communication difficulties. Eleuterio has been seen previously on 4-14-22, and results revealed a normal, sloping to severe sensorineural hearing loss, bilaterally, slightly worse in the left ear. The patient was medically evaluated and determined to be cleared for amplification by Pranay Child MD, at ENT consult dated 4-14-22. Eleuterio notes difficulty with communication in a variety of listening situations.    OBJECTIVE:  Abuse Screening:  Do you feel unsafe at home or work/school? No  Do you feel threatened by someone? No  Does anyone try to keep you from having contact with others, or doing things outside of your home? No  Physical signs of abuse present? No    Patient is a hearing aid candidate. Patient would like to move forward with a hearing aid evaluation today. Therefore, the patient was presented with different options for amplification to help aid in communication. Discussed styles, levels of technology and monaural vs. binaural fitting.     The hearing aid(s) mutually chosen were:  Binaural: Phonak Audeo P50-R CLARITZA  COLOR: H0  BATTERY SIZE: rechargeable  EARMOLD/TIPS: generic domes; size to be determined at fitting appointment  CANAL/ LENGTH: 1 S, bilaterally      ASSESSMENT:     ICD-10-CM    1. Sensorineural hearing loss (SNHL) of left ear with restricted hearing of right ear  H90.A22    2. Sensorineural hearing loss (SNHL) of both ears  H90.3 Adult Audiology Referral       Reviewed purchase information and warranty information with patient. The 45 day trial period was explained to patient. The patient was given a copy of the [place] Department of Health consumer brochure on purchasing hearing instruments. Patient risk factors have been provided to the patient in writing prior to the sale of the  hearing aid per FDA regulation. The risk factors are also available in the User Instructional Booklet to be presented on the day of the hearing aid fitting. Hearing aid(s) ordered. Hearing aid evaluation completed.    PLAN: Eleuterio is scheduled to return 9-14-22 for a hearing aid fitting and programming. Purchase agreement will be completed on that date. IRP appointment was scheduled 9-28-22. Please contact this clinic with any questions or concerns.      Blair Obregon., [facility]-A  [place] Licensed Audiologist [id number]

## 2022-08-25 DIAGNOSIS — E78.01 FAMILIAL HYPERCHOLESTEROLEMIA: ICD-10-CM

## 2022-08-25 RX ORDER — ALIROCUMAB 75 MG/ML
INJECTION, SOLUTION SUBCUTANEOUS
Qty: 6 ML | Refills: 1 | Status: SHIPPED | OUTPATIENT
Start: 2022-08-25 | End: 2023-02-06

## 2022-09-14 ENCOUNTER — OFFICE VISIT (OUTPATIENT)
Dept: AUDIOLOGY | Facility: CLINIC | Age: 67
End: 2022-09-14
Payer: COMMERCIAL

## 2022-09-14 DIAGNOSIS — H90.A22 SENSORINEURAL HEARING LOSS (SNHL) OF LEFT EAR WITH RESTRICTED HEARING OF RIGHT EAR: Primary | ICD-10-CM

## 2022-09-14 PROCEDURE — V5020 CONFORMITY EVALUATION: HCPCS | Mod: RT | Performed by: AUDIOLOGIST

## 2022-09-14 PROCEDURE — V5011 HEARING AID FITTING/CHECKING: HCPCS | Mod: RT | Performed by: AUDIOLOGIST

## 2022-09-14 PROCEDURE — 92593 PR HEARING AID CHECK, BINAURAL: CPT | Performed by: AUDIOLOGIST

## 2022-09-14 PROCEDURE — V5261 HEARING AID, DIGIT, BIN, BTE: HCPCS | Performed by: AUDIOLOGIST

## 2022-09-14 PROCEDURE — V5160 DISPENSING FEE BINAURAL: HCPCS | Performed by: AUDIOLOGIST

## 2022-09-14 NOTE — PROGRESS NOTES
AUDIOLOGY REPORT    SUBJECTIVE: Eleuterio Camejo, a 66 year old male, was seen in the Audiology Clinic at [facility] today for a Binaural hearing aid fitting. Previous results have revealed a bilateral, normal sloping to severe sensorineural hearing loss. The patient was given medical clearance to pursue amplification by Pranay Child MD, at ENT consult dated 4-14-22.    OBJECTIVE:  Prior to fitting, a hearing aid check was performed to ensure device functionality. The hearing aid conformity evaluation was completed.The hearing aids were placed and they provided a good fit. Real-ear-probe-microphone measurements were completed on the Open[employer]ark system and were a good match to NAL-NL2 target with soft sounds audible, moderate sounds comfortable, and loud sounds below discomfort. UCLs are verified through maximum power output measures and demonstrate appropriate limiting of loud inputs. Mr. Camejo was oriented to proper hearing aid use, care, cleaning (no water, dry brush), use of , aid insertion/removal, user booklet, warranty information, storage cases, and other hearing aid details. The patient confirmed understanding of hearing aid use and care, and showed proper insertion of hearing aid and  use while in the office today. Mr. Camejo reported good volume and sound quality today. No Bluetooth pairing was completed in office today, at patient request. This topic will be revisited at the IRP appointment.    EAR(S) FIT: Binaural  MA HEARING AID MAKE: Right: Phonak Audeo P50-R; Left: Phonak Audeo P50-R  HEARING AID STYLE: Right: CLARITZA; Left: CLARITZA  DOME SIZE: Right: small open  ; Left:: small open     LENGTH: Right:  1S; Left:  1S  SERIAL NUMBERS: Right: [id number]; Left: [id number]  WARRANTY END DATE: Right: 11/28/2025; Left:: 11/28/2025      CHARGES:   Hearing Aid Check: Binaural, 95742, $81.00  Dispensing Fee: Binaural, , $500.00,   Fit/Orientation: Binaural, ,  $416.00  Hearing Aid Conformity Evaluation: 2, , $174.00  Hearing Aid Digital: Binaural, BTE,  $2429.00  Total: $3600.00       ASSESSMENT: Binaural hearing aid fitting completed today. Verification measures were performed. The 45 day trial period was explained to patient, and they expressed understanding. Mr. Camejo signed the Hearing Aid Purchase Agreement and was given a copy, as well as details on his hearing aids. Patient was counseled that exact out of pocket amounts cannot be determined for hearing aid claims being sent to insurance. Any insurance coverage information presented to the patient is an estimate only, and is not a guarantee of payment. Patient has been advised to check with their own insurance.    PLAN: Mr. Camejo will return for follow-up 9-28-22 for a hearing aid review appointment. Please call this clinic with questions regarding today s appointment.    Ade Obregon, [facility]-A  [place] Licensed Audiologist [phone]

## 2022-09-15 DIAGNOSIS — E78.5 HYPERLIPIDEMIA: ICD-10-CM

## 2022-09-15 RX ORDER — LISINOPRIL 10 MG/1
TABLET ORAL
Qty: 90 TABLET | Refills: 1 | Status: SHIPPED | OUTPATIENT
Start: 2022-09-15 | End: 2023-03-09

## 2022-09-27 ENCOUNTER — MYC MEDICAL ADVICE (OUTPATIENT)
Dept: FAMILY MEDICINE | Facility: CLINIC | Age: 67
End: 2022-09-27

## 2022-09-27 NOTE — TELEPHONE ENCOUNTER
Chief Complaint   Patient presents with     Covid Concern     Medical message sent to patient. Advised virtual visit.  Jeannette William RN on 9/27/2022 at 12:56 PM

## 2022-09-28 ENCOUNTER — VIRTUAL VISIT (OUTPATIENT)
Dept: FAMILY MEDICINE | Facility: CLINIC | Age: 67
End: 2022-09-28
Payer: COMMERCIAL

## 2022-09-28 DIAGNOSIS — I25.10 CORONARY ARTERY DISEASE INVOLVING NATIVE CORONARY ARTERY OF NATIVE HEART WITHOUT ANGINA PECTORIS: ICD-10-CM

## 2022-09-28 DIAGNOSIS — R05.9 COUGH: ICD-10-CM

## 2022-09-28 DIAGNOSIS — U07.1 INFECTION DUE TO 2019 NOVEL CORONAVIRUS: Primary | ICD-10-CM

## 2022-09-28 DIAGNOSIS — I10 ESSENTIAL HYPERTENSION: ICD-10-CM

## 2022-09-28 PROCEDURE — 99213 OFFICE O/P EST LOW 20 MIN: CPT | Mod: CS | Performed by: NURSE PRACTITIONER

## 2022-09-28 RX ORDER — BENZONATATE 100 MG/1
100 CAPSULE ORAL 3 TIMES DAILY PRN
Qty: 30 CAPSULE | Refills: 0 | Status: SHIPPED | OUTPATIENT
Start: 2022-09-28 | End: 2023-02-28

## 2022-09-28 NOTE — PROGRESS NOTES
"Hong is a 66 year old who is being evaluated via a billable video visit.      How would you like to obtain your AVS? MyChart  If the video visit is dropped, the invitation should be resent by: Text to cell phone: [phone]  Will anyone else be joining your video visit? No        Assessment & Plan     Infection due to 2019 novel coronavirus  High risk for severe disease  - nirmatrelvir and ritonavir (PAXLOVID) therapy pack; Take 3 tablets by mouth 2 times daily for 5 days (Take 2 Nirmatrelvir tablets and 1 Ritonavir tablet twice daily for 5 days)  - benzonatate (TESSALON) 100 MG capsule; Take 1 capsule (100 mg) by mouth 3 times daily as needed for cough    Cough  benzonatate (TESSALON) 100 MG capsule; Take 1 capsule (100 mg) by mouth 3 times daily as needed for cough      Hypertension  nirmatrelvir and ritonavir (PAXLOVID) therapy pack; Take 3 tablets by mouth 2 times daily for 5 days (Take 2 Nirmatrelvir tablets and 1 Ritonavir tablet twice daily for 5 days)      Coronary artery disease involving native coronary artery of native heart without angina pectoris  nirmatrelvir and ritonavir (PAXLOVID) therapy pack; Take 3 tablets by mouth 2 times daily for 5 days (Take 2 Nirmatrelvir tablets and 1 Ritonavir tablet twice daily for 5 days)          Review of external notes as documented elsewhere in note  Prescription drug management  22 minutes spent on the date of the encounter doing chart review, review of test results, interpretation of tests, patient visit and documentation        BMI:   Estimated body mass index is 25.2 kg/m  as calculated from the following:    Height as of 2/3/22: 1.689 m (5' 6.5\").    Weight as of 5/6/22: 71.9 kg (158 lb 8 oz).       See Patient Instructions  Patient Instructions     Instructions for Patients    HOLD ROSUVASTATIN FOR 5 DAYS      What are the symptoms of COVID-19?  Symptoms can include fever, cough, shortness of breath, chills, headache, muscle pain sore throat, fatigue, runny " or stuffy nose, and loss of taste and smell. Other less common symptoms include nausea, vomiting, or diarrhea (watery stools).    Know when to call 911. Emergency warning signs include:    Trouble breathing or shortness of breath    Pain or pressure in the chest that doesn't go away    Feeling confused like you haven't felt before, or not being able to wake up    Bluish-colored lips or face    How can I take care of myself?  1. Get lots of rest. Drink extra fluids (unless a doctor has told you not to).  2. Take Tylenol (acetaminophen) for fever or pain. If you have liver or kidney problems, ask your family doctor if it's okay to take Tylenol   Adults:   650 mg (two 325 mg pills or tablets) every 4 to 6 hours, or...   1,000 mg (two 500 mg pills or tablets) every 8 hours as needed.  Note: Don't take more than 3,000 mg in one day. Acetaminophen is found in many medicines (both prescribed and over the counter). Read all labels to be sure you don't take too much.  For children, check the Tylenol bottle for the right dose. The dose is based on the child's age or weight.  3. Take over the counter medicines for your symptoms as needed. Talk to your pharmacist.  4. If you have other health problems (like cancer, heart failure, an organ transplant, or severe kidney disease): Call your specialty clinic if you don't feel better in the next 2 days.    Where can I get more information?    [facility] COVID-19 Resource Hub: [email or web address]/covid19/     CDC Quarantine & Isolation: https://www.cdc.gov/coronavirus/2019-ncov/your-health/quarantine-isolation.html     CDC - What to Do If You're Sick: https://www.cdc.gov/coronavirus/2019-ncov/if-you-are-sick/index.html    [facility] clinical trials (COVID-19 research studies): clinicalaffairs.[facility]/umn-clinical-trials    [place] Department [place] COVID-19 Public Hotline: 1-431.295.6241      No follow-ups on file.    SALIMA [facility]  [facility]    Conner Pitts is a 66 year old accompanied by his self, presenting for the following health issues:  Covid Concern      HPI   Tested Positive 1 day ago  Symptoms onset 1.5 days  Body aches, dry cough , runny nose, Headache.   First episode of Covid   O2 Sats is 97   Took Rosuvastatin this AM       COVID-19 Symptom Review  How many days ago did these symptoms start? Tuesday 9/27    Are any of the following symptoms significant for you?    New or worsening difficulty breathing? No    Worsening cough? Yes, it's a dry cough.     Fever or chills? No    Headache: YES    Sore throat: YES    Chest pain: No    Diarrhea: No    Body aches? YES    What treatments has patient tried? Tylenol   Does patient live in a nursing home, group home, or shelter? No  Does patient have a way to get food/medications during quarantined? Yes, I have a friend or family member who can help me.          Review of Systems   Constitutional, HEENT, cardiovascular, pulmonary, gi and gu systems are negative, except as otherwise noted.      Objective           Vitals:  No vitals were obtained today due to virtual visit.    Physical Exam   GENERAL: Healthy, alert and no distress  EYES: Eyes grossly normal to inspection.  No discharge or erythema, or obvious scleral/conjunctival abnormalities.  RESP:Dry cough during visit .  No audible wheeze,or visible cyanosis.  No visible retractions or increased work of breathing.    SKIN: Visible skin clear. No significant rash, abnormal pigmentation or lesions.  NEURO: Cranial nerves grossly intact.  Mentation and speech appropriate for age.  PSYCH: Mentation appears normal, affect normal/bright, judgement and insight intact, normal speech and appearance well-groomed.    Office Visit on 05/06/2022   Component Date Value Ref Range Status     Cholesterol 05/06/2022 98  <=199 mg/dL Final     Triglycerides 05/06/2022 150 (A) <=149 mg/dL Final     Direct Measure HDL 05/06/2022 39 (A) >=40  mg/dL Final    HDL Cholesterol Reference Range:     0-2 years:   No reference ranges established for patients under 2 years old  at [facility] for lipid analytes.    2-8 years:  Greater than 45 mg/dL     18 years and older:   Female: Greater than or equal to 50 mg/dL   Male:   Greater than or equal to 40 mg/dL     LDL Cholesterol Calculated 05/06/2022 29  <=129 mg/dL Final     Patient Fasting > 8hrs? 05/06/2022 Unknown   Final     Sodium 136 - 145 mmol/L 138    138   142  142      Potassium 3.5 - 5.0 mmol/L 3.8  3.7  4.1  4.3  3.9  3.6  4.1     Chloride 98 - 107 mmol/L 108 High     106   112 High   115 High      Carbon Dioxide (CO2) 22 - 31 mmol/L 22    27   21 Low   23     Anion Gap 5 - 18 mmol/L 8    5   9  4 Low      Glucose 70 - 125 mg/dL 95    109   109  122     Calcium 8.5 - 10.5 mg/dL 8.8    8.6   8.0 Low   8.6     Urea Nitrogen 8 - 22 mg/dL 15    14   13  13     Creatinine 0.70 - 1.30 mg/dL 0.93    0.72   0.80  0.73     GFR Estimate If Black >60 mL/min/1.73m2 >60    >60   >60  >60     GFR Estimate >60 mL/min/1.73m2 >60                  Video-Visit Details    Video Start Time: 4:52 PM  ( 16:45 )     Type of service:  Video Visit    Video End Time:5:08 PM    Originating Location (pt. Location): Home    Distant Location (provider location):  [facility]     Platform used for Video Visit: Emily Turner (Lori) CNP  [facility]  Certificate in Travel Health

## 2022-09-28 NOTE — PATIENT INSTRUCTIONS
Instructions for Patients    HOLD ROSUVASTATIN FOR 5 DAYS      What are the symptoms of COVID-19?  Symptoms can include fever, cough, shortness of breath, chills, headache, muscle pain sore throat, fatigue, runny or stuffy nose, and loss of taste and smell. Other less common symptoms include nausea, vomiting, or diarrhea (watery stools).    Know when to call 911. Emergency warning signs include:  Trouble breathing or shortness of breath  Pain or pressure in the chest that doesn't go away  Feeling confused like you haven't felt before, or not being able to wake up  Bluish-colored lips or face    How can I take care of myself?  Get lots of rest. Drink extra fluids (unless a doctor has told you not to).  Take Tylenol (acetaminophen) for fever or pain. If you have liver or kidney problems, ask your family doctor if it's okay to take Tylenol   Adults:   650 mg (two 325 mg pills or tablets) every 4 to 6 hours, or...   1,000 mg (two 500 mg pills or tablets) every 8 hours as needed.  Note: Don't take more than 3,000 mg in one day. Acetaminophen is found in many medicines (both prescribed and over the counter). Read all labels to be sure you don't take too much.  For children, check the Tylenol bottle for the right dose. The dose is based on the child's age or weight.  Take over the counter medicines for your symptoms as needed. Talk to your pharmacist.  If you have other health problems (like cancer, heart failure, an organ transplant, or severe kidney disease): Call your specialty clinic if you don't feel better in the next 2 days.    Where can I get more information?  [facility] COVID-19 Resource Hub: [email or web address]/[email or web address]/   CDC Quarantine & Isolation: https://www.cdc.gov/coronavirus/2019-ncov/your-health/quarantine-isolation.html   CDC - What to Do If You're Sick: https://www.cdc.gov/coronavirus/2019-ncov/if-you-are-sick/index.html  [facility] clinical trials (COVID-19 research studies):  clinicalaffairs.[facility]/n-clinical-trials  [place] Department of [place] COVID-19 Public Hotline: 1-353.978.6823

## 2022-10-05 ENCOUNTER — OFFICE VISIT (OUTPATIENT)
Dept: AUDIOLOGY | Facility: CLINIC | Age: 67
End: 2022-10-05
Payer: COMMERCIAL

## 2022-10-05 DIAGNOSIS — H90.A22 SENSORINEURAL HEARING LOSS (SNHL) OF LEFT EAR WITH RESTRICTED HEARING OF RIGHT EAR: Primary | ICD-10-CM

## 2022-10-05 PROCEDURE — V5299 HEARING SERVICE: HCPCS | Performed by: AUDIOLOGIST

## 2022-10-05 NOTE — PROGRESS NOTES
"AUDIOLOGY REPORT    SUBJECTIVE: Eleuterio Camejo is a 66 year old male who was seen in the Audiology Clinic at the [facility] [place] on 10/5/2022  for a follow-up check regarding the fitting of new hearing aids. Previous results have revealed normal, sloping to severe sensorineural hearing loss, bilaterally, with a slight asymmetry between ears (left ear slightly worse than right ear).  The patient has been seen previously in this clinic and was fit binaurally with Roxro Pharmaeo P50-R CLARITZA devices on 9-14-22. Mr. Camejo reports \"about 95% improvement\" in hearing ability in a wide range of listening situations. He still reported some difficulty understanding his five-year-old granddaughter.    OBJECTIVE:   Datalogging indicated average daily wear of nine to eleven hours per day, for the past 21 days. Targets are at 100% in both devices, up from 90% at the fitting appointment 9-14-22. Based on patient report, gain was increased in high frequencies for moderate female speech.    Reviewed 45 day trial period, care, cleaning (no water, dry brush),  use, aid insertion/removal, volume adjustment, user booklet, warranty information, storage cases, and other hearing aid details.     The process of removing the dome and exchanging the wax guard was demonstrated on one device; Mr. Camejo was able to perform this task independently on the second device. Mr. Camejo's phone was successfully paired to the hearing aids via Bluetooth; streaming functionality was confirmed. An additional storage case and two additional small open domes were provided at patient request.    No charge visit today (in warranty hearing aid check).     ASSESSMENT: A follow-up appointment for hearing aid fitting was completed today. Changes to hearing aid was completed as outlined above.     PLAN: Mr. Camejo will return for follow-up as needed; he is aware that his trial period ends 10-31-22, and any returns or exchanges must be completed in " writing prior to that date. Mr. Camejo expressed verbal understanding of this information and plan. Please call this clinic with any questions regarding today s appointment.    Ade Obregon, [facility]-A  [place] Licensed Audiologist [id number]

## 2022-12-18 DIAGNOSIS — Z95.1 S/P CABG (CORONARY ARTERY BYPASS GRAFT): ICD-10-CM

## 2022-12-19 RX ORDER — METOPROLOL TARTRATE 50 MG
TABLET ORAL
Qty: 180 TABLET | Refills: 1 | Status: SHIPPED | OUTPATIENT
Start: 2022-12-19 | End: 2023-02-28

## 2023-01-02 ENCOUNTER — TELEPHONE (OUTPATIENT)
Dept: CARDIOLOGY | Facility: CLINIC | Age: 68
End: 2023-01-02

## 2023-01-02 NOTE — TELEPHONE ENCOUNTER
Central Prior Authorization Team   Phone: [phone]    PA Initiation    Medication: PRALUENT 75 MG/ML injectable pen   Insurance Company: [employer] - Phone [phone] Fax [phone]  Pharm[facility] Filling [id number] [place], [place] - [address]  Filling Pharmacy Phone: [phone]  Filling Pharmacy Fax:    Start Date: 1/2/2023    Answered question set below, faxed back for review

## 2023-01-02 NOTE — TELEPHONE ENCOUNTER
Prior Authorization Retail Medication Request    Medication/Dose: PRALUENT 75 MG/ML injectable pen   ICD code (if different than what is on RX):    Previously Tried and Failed:    Rationale:      Insurance Name:    Insurance ID:        Pharmacy Information (if different than what is on RX)  Name:    Phone:

## 2023-01-05 NOTE — TELEPHONE ENCOUNTER
Prior Authorization Approval    Authorization Effective Date: 1/4/2023  Authorization Expiration Date: 1/4/2024  Medication: PRALUENT 75 MG/ML injectable pen   Approved Dose/Quantity:   Reference #:     Insurance Company: [employer] - Phone [phone] Fax [phone]  Expected CoPay:       CoPay Card Available:      Foundation Assistance Needed:    Which Pharmacy is filling the prescription (Not needed for infusion/clinic administered): [id number] [place] [address] [place]  Pharmacy Notified: Yes  Patient Notified: Yes

## 2023-02-28 ENCOUNTER — OFFICE VISIT (OUTPATIENT)
Dept: FAMILY MEDICINE | Facility: CLINIC | Age: 68
End: 2023-02-28
Payer: COMMERCIAL

## 2023-02-28 VITALS
BODY MASS INDEX: 27.15 KG/M2 | SYSTOLIC BLOOD PRESSURE: 118 MMHG | RESPIRATION RATE: 16 BRPM | HEIGHT: 67 IN | HEART RATE: 67 BPM | DIASTOLIC BLOOD PRESSURE: 70 MMHG | WEIGHT: 173 LBS | TEMPERATURE: 97.6 F | OXYGEN SATURATION: 99 %

## 2023-02-28 DIAGNOSIS — Z12.5 SCREENING FOR PROSTATE CANCER: ICD-10-CM

## 2023-02-28 DIAGNOSIS — Z95.1 S/P CABG (CORONARY ARTERY BYPASS GRAFT): ICD-10-CM

## 2023-02-28 DIAGNOSIS — Z11.59 NEED FOR HEPATITIS C SCREENING TEST: ICD-10-CM

## 2023-02-28 DIAGNOSIS — L98.9 SKIN LESION: ICD-10-CM

## 2023-02-28 DIAGNOSIS — Z23 NEED FOR VACCINATION FOR PNEUMOCOCCUS: ICD-10-CM

## 2023-02-28 DIAGNOSIS — Z00.00 ANNUAL PHYSICAL EXAM: Primary | ICD-10-CM

## 2023-02-28 DIAGNOSIS — I25.10 CORONARY ARTERY DISEASE INVOLVING NATIVE CORONARY ARTERY OF NATIVE HEART WITHOUT ANGINA PECTORIS: ICD-10-CM

## 2023-02-28 DIAGNOSIS — H60.11 CELLULITIS OF HELIX OF RIGHT EAR: ICD-10-CM

## 2023-02-28 LAB
ALBUMIN SERPL BCG-MCNC: 4.3 G/DL (ref 3.5–5.2)
ALP SERPL-CCNC: 72 U/L (ref 40–129)
ALT SERPL W P-5'-P-CCNC: 54 U/L (ref 10–50)
ANION GAP SERPL CALCULATED.3IONS-SCNC: 12 MMOL/L (ref 7–15)
AST SERPL W P-5'-P-CCNC: 44 U/L (ref 10–50)
BILIRUB SERPL-MCNC: 0.5 MG/DL
BUN SERPL-MCNC: 18.2 MG/DL (ref 8–23)
CALCIUM SERPL-MCNC: 9.2 MG/DL (ref 8.8–10.2)
CHLORIDE SERPL-SCNC: 108 MMOL/L (ref 98–107)
CHOLEST SERPL-MCNC: 74 MG/DL
CREAT SERPL-MCNC: 0.95 MG/DL (ref 0.67–1.17)
DEPRECATED HCO3 PLAS-SCNC: 23 MMOL/L (ref 22–29)
GFR SERPL CREATININE-BSD FRML MDRD: 88 ML/MIN/1.73M2
GLUCOSE SERPL-MCNC: 131 MG/DL (ref 70–99)
HDLC SERPL-MCNC: 37 MG/DL
LDLC SERPL CALC-MCNC: 16 MG/DL
NONHDLC SERPL-MCNC: 37 MG/DL
POTASSIUM SERPL-SCNC: 4.2 MMOL/L (ref 3.4–5.3)
PROT SERPL-MCNC: 6.6 G/DL (ref 6.4–8.3)
PSA SERPL-MCNC: 4.3 NG/ML (ref 0–4.5)
SODIUM SERPL-SCNC: 143 MMOL/L (ref 136–145)
TRIGL SERPL-MCNC: 104 MG/DL

## 2023-02-28 PROCEDURE — 90471 IMMUNIZATION ADMIN: CPT | Performed by: NURSE PRACTITIONER

## 2023-02-28 PROCEDURE — 99397 PER PM REEVAL EST PAT 65+ YR: CPT | Mod: 25 | Performed by: NURSE PRACTITIONER

## 2023-02-28 PROCEDURE — 90677 PCV20 VACCINE IM: CPT | Performed by: NURSE PRACTITIONER

## 2023-02-28 PROCEDURE — G0103 PSA SCREENING: HCPCS | Performed by: NURSE PRACTITIONER

## 2023-02-28 PROCEDURE — 80053 COMPREHEN METABOLIC PANEL: CPT | Performed by: NURSE PRACTITIONER

## 2023-02-28 PROCEDURE — 36415 COLL VENOUS BLD VENIPUNCTURE: CPT | Performed by: NURSE PRACTITIONER

## 2023-02-28 PROCEDURE — 80061 LIPID PANEL: CPT | Performed by: NURSE PRACTITIONER

## 2023-02-28 PROCEDURE — 86803 HEPATITIS C AB TEST: CPT | Performed by: NURSE PRACTITIONER

## 2023-02-28 RX ORDER — CIPROFLOXACIN 250 MG/1
250 TABLET, FILM COATED ORAL 2 TIMES DAILY
Qty: 20 TABLET | Refills: 0 | Status: SHIPPED | OUTPATIENT
Start: 2023-02-28 | End: 2023-08-30

## 2023-02-28 RX ORDER — MUPIROCIN 20 MG/G
OINTMENT TOPICAL 2 TIMES DAILY
Qty: 15 G | Refills: 0 | Status: SHIPPED | OUTPATIENT
Start: 2023-02-28 | End: 2023-08-30

## 2023-02-28 ASSESSMENT — PAIN SCALES - GENERAL: PAINLEVEL: MODERATE PAIN (4)

## 2023-02-28 NOTE — PROGRESS NOTES
SUBJECTIVE:   CC: Hong is an 67 year old who presents for preventative health visit.      3 months ago scraped ear. Cleaned it up. Now its still persisting.  Skiing will rip it off. Leaks clear fluid.  Tried neosporin which doesn't do anything for clearing it.  No pain unless the scab rips off. Does have new hearing aids which push behind it but don't touch it proper.  No fever or chills.  Rest of the skin is fine.      CAD  Sees Dr. Eller manages. BP good. No CP. No LE swelling.         Patient has been advised of split billing requirements and indicates understanding: No  HPI    Today's PHQ-2 Score:   PHQ-2 ( 1999 Pfizer) 2/21/2023   Q1: Little interest or pleasure in doing things 0   Q2: Feeling down, depressed or hopeless 0   PHQ-2 Score 0   Q1: Little interest or pleasure in doing things Not at all   Q2: Feeling down, depressed or hopeless Not at all   PHQ-2 Score 0           Social History     Tobacco Use     Smoking status: Never     Smokeless tobacco: Never   Substance Use Topics     Alcohol use: No       Last PSA: No results found for: PSA    Reviewed orders with patient. Reviewed health maintenance and updated orders accordingly - Yes  Lab work is in process    Reviewed and updated as needed this visit by clinical staff   Tobacco  Allergies  Meds              Reviewed and updated as needed this visit by Provider                 Past Medical History:   Diagnosis Date     Acute non Q wave MI (myocardial infarction), initial episode of care (H) 2/18/2019     Coronary artery disease 2003    Stent placement x3     Essential hypertension      Hyperlipidemia      Non-ST elevation myocardial infarction (NSTEMI) (H)      Peptic ulcer disease      Postoperative nausea and vomiting       Past Surgical History:   Procedure Laterality Date     CV CORONARY ANGIOGRAM N/A 2/18/2019    Procedure: Coronary Angiogram;  Surgeon: Sesar Macdonald MD;  Location: [facility] Cath Lab;  Service: Cardiology     CV  "LEFT HEART CATHETERIZATION WITHOUT LEFT VENTRICULOGRAM Left 2/18/2019    Procedure: Left Heart Catheterization Without Left Ventriculogram;  Surgeon: Sesar Macdonald MD;  Location: [facility] Cath Lab;  Service: Cardiology     TONSILLECTOMY       TUMOR REMOVAL      Scalp.  Benign.     [facility] CABG, ARTERIAL, FOUR+ N/A 2/20/2019    Procedure: anesthesia, transesophageal echocardiogram, CORONARY ARTERY BYPASS GRAFT X4, left internal mammary artery, endoscopic vein harvest;  Surgeon: Radha Sandoval MD;  Location: [facility] OR;  Service: Cardiovascular     [facility] CORONARY STENT PERCUT, INITIAL VESSEL  2003    x3- Cath Stent Placement;  Proc Date: 01/01/2003;  Comments: LIZ MID RIGHT POSTEIOR ; ; PROX FIRST DIAGNOL       Review of Systems  CONSTITUTIONAL: NEGATIVE for fever, chills, change in weight  INTEGUMENTARY/SKIN: NEGATIVE for worrisome rashes, moles or lesions  EYES: NEGATIVE for vision changes or irritation  ENT: NEGATIVE mouth and throat problems.  Positive for ear sore.  RESP: NEGATIVE for significant cough or SOB  CV: NEGATIVE for chest pain, palpitations or peripheral edema  GI: NEGATIVE for nausea, abdominal pain, heartburn, or change in bowel habits   male: negative for dysuria, hematuria, decreased urinary stream, erectile dysfunction, urethral discharge  MUSCULOSKELETAL: NEGATIVE for significant arthralgias or myalgia  NEURO: NEGATIVE for weakness, dizziness or paresthesias  PSYCHIATRIC: NEGATIVE for changes in mood or affect    OBJECTIVE:   /70   Pulse 67   Temp 97.6  F (36.4  C)   Resp 16   Ht 1.689 m (5' 6.5\")   Wt 78.5 kg (173 lb)   SpO2 99%   BMI 27.50 kg/m      Physical Exam  GENERAL: healthy, alert and no distress  EYES: Eyes grossly normal to inspection, PERRL and conjunctivae and sclerae normal  HENT: Canal normal.  Along the helix there is a raised firm lesion with serous fluid around it.  No fluctuance palpated  NECK: no adenopathy, no asymmetry, masses, or scars and " "thyroid normal to palpation  RESP: lungs clear to auscultation - no rales, rhonchi or wheezes  CV: regular rate and rhythm, normal S1 S2, no S3 or S4, no murmur, click or rub, no peripheral edema and peripheral pulses strong  ABDOMEN: soft, nontender, no hepatosplenomegaly, no masses and bowel sounds normal  MS: no gross musculoskeletal defects noted, no edema  SKIN: no suspicious lesions or rashes  NEURO: Normal strength and tone, mentation intact and speech normal  PSYCH: mentation appears normal, affect normal/bright    Diagnostic Test Results:  Labs reviewed in Epic    ASSESSMENT/PLAN:       ICD-10-CM    1. Annual physical exam  Z00.00       2. Need for hepatitis C screening test  Z11.59 Hepatitis C Screen Reflex to HCV RNA Quant and Genotype      3. S/P CABG (coronary artery bypass graft)  Z95.1       4. Coronary artery disease involving native coronary artery of native heart without angina pectoris  I25.10 Lipid panel     Comprehensive metabolic panel (BMP + Alb, Alk Phos, ALT, AST, Total. Bili, TP)      5. Need for vaccination for pneumococcus  Z23 Pneumococcal 20 Valent Conjugate (PCV20)      6. Screening for prostate cancer  Z12.5 PSA, screen        Doing well.  Continue active lifestyle.  Plans to retire within the next year and will be on Medicare at that time.  Pneumococcal vaccine given.  Update screening labs.  Neupro sent in ciprofloxacin for the helix and dermatology if failing to improve as anticipated.    Patient has been advised of split billing requirements and indicates understanding: No      COUNSELING:   Reviewed preventive health counseling, as reflected in patient instructions      BMI:   Estimated body mass index is 27.5 kg/m  as calculated from the following:    Height as of this encounter: 1.689 m (5' 6.5\").    Weight as of this encounter: 78.5 kg (173 lb).         He reports that he has never smoked. He has never used smokeless tobacco.    Shawn Lee NP  [facility] " ROSELYN BLEVINS

## 2023-02-28 NOTE — PATIENT INSTRUCTIONS
Updating blood work.    Pneumonia shot given.    Let's hit this with topical mupirocin and oral ciprofloxacin.  If you start to develop all over tendon/joint pains, stop it and let me know.    As a backup plan, if this does not take care of the problem then we should connect with dermatology as well.

## 2023-03-01 LAB — HCV AB SERPL QL IA: NONREACTIVE

## 2023-03-09 DIAGNOSIS — E78.5 HYPERLIPIDEMIA: ICD-10-CM

## 2023-03-09 RX ORDER — LISINOPRIL 10 MG/1
TABLET ORAL
Qty: 90 TABLET | Refills: 1 | Status: SHIPPED | OUTPATIENT
Start: 2023-03-09 | End: 2023-10-27

## 2023-04-23 ENCOUNTER — HEALTH MAINTENANCE LETTER (OUTPATIENT)
Age: 68
End: 2023-04-23

## 2023-05-07 ENCOUNTER — MYC REFILL (OUTPATIENT)
Dept: FAMILY MEDICINE | Facility: CLINIC | Age: 68
End: 2023-05-07
Payer: COMMERCIAL

## 2023-05-07 DIAGNOSIS — Z95.1 S/P CABG (CORONARY ARTERY BYPASS GRAFT): ICD-10-CM

## 2023-05-08 RX ORDER — METOPROLOL TARTRATE 50 MG
TABLET ORAL
Qty: 180 TABLET | Refills: 1 | OUTPATIENT
Start: 2023-05-08

## 2023-05-11 ENCOUNTER — MYC REFILL (OUTPATIENT)
Dept: FAMILY MEDICINE | Facility: CLINIC | Age: 68
End: 2023-05-11
Payer: COMMERCIAL

## 2023-05-11 DIAGNOSIS — I10 ESSENTIAL HYPERTENSION: Primary | ICD-10-CM

## 2023-05-11 DIAGNOSIS — Z95.1 S/P CABG (CORONARY ARTERY BYPASS GRAFT): ICD-10-CM

## 2023-05-12 ENCOUNTER — MYC MEDICAL ADVICE (OUTPATIENT)
Dept: CARDIOLOGY | Facility: CLINIC | Age: 68
End: 2023-05-12
Payer: COMMERCIAL

## 2023-05-12 DIAGNOSIS — E78.5 HYPERLIPIDEMIA, UNSPECIFIED HYPERLIPIDEMIA TYPE: Primary | ICD-10-CM

## 2023-05-12 RX ORDER — METOPROLOL TARTRATE 50 MG
50 TABLET ORAL 2 TIMES DAILY
Qty: 180 TABLET | Refills: 0 | Status: SHIPPED | OUTPATIENT
Start: 2023-05-12 | End: 2023-08-07

## 2023-05-12 NOTE — TELEPHONE ENCOUNTER
Will send through "[employer] in place of Praluent. Chart review. Last LDL with PMD in February was 16.     Called Hong and he confirmed that he is on both Zetia and Rosuvastatin 40 mg in addition to his PCSK9. Will clarify all 3 agents. -[facility] [place]

## 2023-05-12 NOTE — TELEPHONE ENCOUNTER
Michael Lozada MD [place], Mallory J RN  Should remain on rosuvastatin and Repatha.  He can stop Zetia           ==Zetia removed from medication list.  but will also send via [employer]. -[facility] [place]

## 2023-05-18 ENCOUNTER — TELEPHONE (OUTPATIENT)
Dept: CARDIOLOGY | Facility: CLINIC | Age: 68
End: 2023-05-18
Payer: COMMERCIAL

## 2023-05-18 DIAGNOSIS — E78.5 HYPERLIPIDEMIA, UNSPECIFIED HYPERLIPIDEMIA TYPE: Primary | ICD-10-CM

## 2023-05-18 NOTE — TELEPHONE ENCOUNTER
----- Message from Jeannette Rodriguez RN sent at 5/18/2023  2:09 PM CDT -----  Regarding: Need for PA?  Laurence Lea- should I just submit a PA for this med instead of waiting for [facility] to choose alternative?

## 2023-05-18 NOTE — TELEPHONE ENCOUNTER
Called Hong to discuss. Apparently his insurance formulary change does not take place until July 1st. We will have to submit Repatha after this date. Reminder set. Praluent re-ordered at previous dose.-[facility] [place]

## 2023-05-29 DIAGNOSIS — E78.5 HYPERLIPIDEMIA: ICD-10-CM

## 2023-05-30 RX ORDER — ROSUVASTATIN CALCIUM 40 MG/1
TABLET, COATED ORAL
Qty: 90 TABLET | Refills: 3 | Status: SHIPPED | OUTPATIENT
Start: 2023-05-30 | End: 2024-05-16

## 2023-07-06 ENCOUNTER — TELEPHONE (OUTPATIENT)
Dept: CARDIOLOGY | Facility: CLINIC | Age: 68
End: 2023-07-06
Payer: COMMERCIAL

## 2023-07-06 DIAGNOSIS — E78.5 HYPERLIPIDEMIA, UNSPECIFIED HYPERLIPIDEMIA TYPE: Primary | ICD-10-CM

## 2023-07-06 NOTE — TELEPHONE ENCOUNTER
Called Hong to discuss if he is ready to switch per his insurance, from Praluent to Repatha. He is ready now. Will submit Rx and FYI to Dr. Lozada. Follow up is scheduled 8/30/23.         Dr. Lozada,  See [employer] messaging 5/12 + telephone encounter dated 5/18. Pt has to switch to Repatha due to insurance change that is effective July 1. I sent this through and you had previously said it was OK to change to Repatha from Praluent. This is just an FYI for when you sign off again- follow up scheduled with you end of August.  Thanks,  Mal

## 2023-07-06 NOTE — TELEPHONE ENCOUNTER
----- Message from Arlette Manning RN sent at 5/18/2023  2:38 PM CDT -----  Regarding: submit repatha july 1  Submit repatha;clari/c praluent

## 2023-08-07 DIAGNOSIS — Z95.1 S/P CABG (CORONARY ARTERY BYPASS GRAFT): ICD-10-CM

## 2023-08-07 RX ORDER — METOPROLOL TARTRATE 50 MG
TABLET ORAL
Qty: 180 TABLET | Refills: 2 | Status: SHIPPED | OUTPATIENT
Start: 2023-08-07 | End: 2024-04-29

## 2023-08-07 NOTE — TELEPHONE ENCOUNTER
"Last Written Prescription Date:  5/12/2023  Last Fill Quantity: 180,  # refills: 0   Last office visit provider:  2/28/2026     Requested Prescriptions   Pending Prescriptions Disp Refills    metoprolol tartrate (LOPRESSOR) 50 MG tablet [Pharmacy Med Name: METOPROLOL TARTRATE 50 MG TAB] 180 tablet 0     Sig: TAKE 1 TABLET (50 MG) BY MOUTH 2 TIMES DAILY NEEDS TO SCHEDULE CARDIOLOGY FOLLOW-UP APPT FOR REFILLS       Beta-Blockers Protocol Passed - 8/7/2023  9:10 AM        Passed - Blood pressure under 140/90 in past 12 months     BP Readings from Last 3 Encounters:   02/28/23 118/70   05/06/22 124/84   02/03/22 128/80                 Passed - Patient is age 6 or older        Passed - Recent (12 mo) or future (30 days) visit within the authorizing provider's specialty     Patient has had an office visit with the authorizing provider or a provider within the authorizing providers department within the previous 12 mos or has a future within next 30 days. See \"Patient Info\" tab in inbasket, or \"Choose Columns\" in Meds & Orders section of the refill encounter.              Passed - Medication is active on med list             Delmi Laurent RN 08/07/23 1:03 PM  "

## 2023-08-30 ENCOUNTER — OFFICE VISIT (OUTPATIENT)
Dept: CARDIOLOGY | Facility: CLINIC | Age: 68
End: 2023-08-30
Payer: COMMERCIAL

## 2023-08-30 VITALS
HEIGHT: 66 IN | HEART RATE: 60 BPM | WEIGHT: 172 LBS | RESPIRATION RATE: 16 BRPM | DIASTOLIC BLOOD PRESSURE: 82 MMHG | SYSTOLIC BLOOD PRESSURE: 122 MMHG | BODY MASS INDEX: 27.64 KG/M2

## 2023-08-30 DIAGNOSIS — I10 ESSENTIAL HYPERTENSION: ICD-10-CM

## 2023-08-30 DIAGNOSIS — Z95.1 S/P CABG (CORONARY ARTERY BYPASS GRAFT): ICD-10-CM

## 2023-08-30 PROCEDURE — 99214 OFFICE O/P EST MOD 30 MIN: CPT | Performed by: INTERNAL MEDICINE

## 2023-08-30 RX ORDER — CYCLOBENZAPRINE HCL 10 MG
10 TABLET ORAL PRN
COMMUNITY
Start: 2023-08-01 | End: 2024-05-09

## 2023-08-30 NOTE — LETTER
"8/30/2023    Shawn Lee, NP  [address] [place] [address]    RE: Eleuterio Camejo       Dear Colleague,     I had the pleasure of seeing Eleuterio Camejo in the [facility].      Cardiology Progress Note     Assessment:  Coronary artery disease, status post non-ST segment elevation myocardial infarction and coronary artery bypass graft surgery(LIMA to LAD and vein grafts to diagonal branch, obtuse marginal 3 and RPDA) in February 2019, stable, no angina  Hypercholesterolemia, on Crestor, Zetia, Repatha, excellent control  Hypertension, good control      Plan:  Continue aggressive secondary prevention of cardiac events.  Encouraged him to stay active continue current medications.  He was told to contact me if he ever develops any chest discomfort or shortness of breath    Follow-up in 1 year    Subjective:   This is 67 year old male who comes in today for follow-up visit.  He has done well.  He denies chest pain or shortness of breath.  He stays physically active.  He rides mountain bikes in the summertime and snowbirds in wintertime.  He has not had any cardiac symptoms.  He tolerates medication well    Review of Systems:   Negative other than history of present illness    Objective:   /82 (BP Location: Left arm, Patient Position: Sitting, Cuff Size: Adult Regular)   Pulse 60   Resp 16   Ht 1.676 m (5' 6\")   Wt 78 kg (172 lb)   BMI 27.76 kg/m    Physical Exam:  GENERAL: no distress  NECK: No JVD  LUNGS: Clear to auscultation.  CARDIAC: regular rhythm, S1 & S2 normal.  No heaves, thrills, gallops or murmurs.  ABDOMEN: flat, negative hepatosplenomegaly, soft and non-tender.  EXTREMITIES: No evidence of cyanosis, clubbing or edema.    Current Outpatient Medications   Medication Sig Dispense Refill    aspirin 81 mg chewable tablet [ASPIRIN 81 MG CHEWABLE TABLET] Chew 81 mg daily.      cyclobenzaprine (FLEXERIL) 10 MG tablet Take 10 mg by mouth as needed      evolocumab " (REPATHA) 140 MG/ML prefilled autoinjector Inject 1 mL (140 mg) Subcutaneous every 14 days 2 mL 11    lisinopril (ZESTRIL) 10 MG tablet TAKE 1 TABLET BY MOUTH EVERY DAY 90 tablet 1    metoprolol tartrate (LOPRESSOR) 50 MG tablet TAKE 1 TABLET (50 MG) BY MOUTH 2 TIMES DAILY NEEDS TO SCHEDULE CARDIOLOGY FOLLOW-UP APPT FOR REFILLS 180 tablet 2    rosuvastatin (CRESTOR) 40 MG tablet TAKE 1 TABLET DAILY. 90 tablet 3       Cardiographics:    Echocardiogram: February 2019  Left ventricle ejection fraction is normal. The calculated left ventricular ejection fraction is 66%.  Normal left ventricular size and systolic function.  Normal right ventricular size and systolic function.  Mild mitral regurgitation        Coronary angio: February 2019  LM normal  LAD mod to severe dz in prox LAD; severe dz in mid with JANEL 2 flow into LAD with collaterals via RCA; first diagonal stent 100%  with collaterals  Circ OM3 small 100% with collaterals  RCA mild to mod diffuse dz with stent in %  with collaterals via LAD     LVEDP 11mmHg        Lab Results    Chemistry/lipid CBC Cardiac Enzymes/BNP/TSH/INR   Recent Labs   Lab Test 02/28/23  0803   CHOL 74   HDL 37*   LDL 16   TRIG 104     Recent Labs   Lab Test 02/28/23  0803 05/06/22  1537 04/09/21  0900   LDL 16 29 14     Recent Labs   Lab Test 02/28/23  0803      POTASSIUM 4.2   CHLORIDE 108*   CO2 23   *   BUN 18.2   CR 0.95   GFRESTIMATED 88   DANGELO 9.2     Recent Labs   Lab Test 02/28/23  0803 04/09/21  0900 02/23/19  0608   CR 0.95 0.93 0.72     Recent Labs   Lab Test 02/19/19  0715   A1C 5.3          Recent Labs   Lab Test 04/09/21  0900   WBC 7.5   HGB 17.2   HCT 49.8   MCV 93        Recent Labs   Lab Test 04/09/21  0900 02/25/19  0554 02/23/19  0608   HGB 17.2 13.8* 12.7*    Recent Labs   Lab Test 02/18/19  0927 02/18/19  0324 02/17/19  2109   TROPONINI 4.53* 6.39* 4.39*     No results for input(s): BNP, NTBNPI, NTBNP in the last 95951 hours.  No  results for input(s): TSH in the last 30761 hours.  Recent Labs   Lab Test 02/21/19  0357 02/20/19  1154 02/19/19  0715   INR 1.24* 1.28* 0.97                         Thank you for allowing me to participate in the care of your patient.      Sincerely,     Michael Lozada MD     [facility]  cc:   Michael Lozada MD  [address]  [address]  [place] [id number]

## 2023-08-30 NOTE — PROGRESS NOTES
"    Cardiology Progress Note     Assessment:  Coronary artery disease, status post non-ST segment elevation myocardial infarction and coronary artery bypass graft surgery(LIMA to LAD and vein grafts to diagonal branch, obtuse marginal 3 and RPDA) in February 2019, stable, no angina  Hypercholesterolemia, on Crestor, Zetia, Repatha, excellent control  Hypertension, good control      Plan:  Continue aggressive secondary prevention of cardiac events.  Encouraged him to stay active continue current medications.  He was told to contact me if he ever develops any chest discomfort or shortness of breath    Follow-up in 1 year    Subjective:   This is 67 year old male who comes in today for follow-up visit.  He has done well.  He denies chest pain or shortness of breath.  He stays physically active.  He rides mountain bikes in the summertime and snowbirds in wintertime.  He has not had any cardiac symptoms.  He tolerates medication well    Review of Systems:   Negative other than history of present illness    Objective:   /82 (BP Location: Left arm, Patient Position: Sitting, Cuff Size: Adult Regular)   Pulse 60   Resp 16   Ht 1.676 m (5' 6\")   Wt 78 kg (172 lb)   BMI 27.76 kg/m    Physical Exam:  GENERAL: no distress  NECK: No JVD  LUNGS: Clear to auscultation.  CARDIAC: regular rhythm, S1 & S2 normal.  No heaves, thrills, gallops or murmurs.  ABDOMEN: flat, negative hepatosplenomegaly, soft and non-tender.  EXTREMITIES: No evidence of cyanosis, clubbing or edema.    Current Outpatient Medications   Medication Sig Dispense Refill    aspirin 81 mg chewable tablet [ASPIRIN 81 MG CHEWABLE TABLET] Chew 81 mg daily.      cyclobenzaprine (FLEXERIL) 10 MG tablet Take 10 mg by mouth as needed      evolocumab (REPATHA) 140 MG/ML prefilled autoinjector Inject 1 mL (140 mg) Subcutaneous every 14 days 2 mL 11    lisinopril (ZESTRIL) 10 MG tablet TAKE 1 TABLET BY MOUTH EVERY DAY 90 tablet 1    metoprolol tartrate (LOPRESSOR) " 50 MG tablet TAKE 1 TABLET (50 MG) BY MOUTH 2 TIMES DAILY NEEDS TO SCHEDULE CARDIOLOGY FOLLOW-UP APPT FOR REFILLS 180 tablet 2    rosuvastatin (CRESTOR) 40 MG tablet TAKE 1 TABLET DAILY. 90 tablet 3       Cardiographics:    Echocardiogram: February 2019  Left ventricle ejection fraction is normal. The calculated left ventricular ejection fraction is 66%.  Normal left ventricular size and systolic function.  Normal right ventricular size and systolic function.  Mild mitral regurgitation        Coronary angio: February 2019  LM normal  LAD mod to severe dz in prox LAD; severe dz in mid with JANEL 2 flow into LAD with collaterals via RCA; first diagonal stent 100%  with collaterals  Circ OM3 small 100% with collaterals  RCA mild to mod diffuse dz with stent in %  with collaterals via LAD     LVEDP 11mmHg        Lab Results    Chemistry/lipid CBC Cardiac Enzymes/BNP/TSH/INR   Recent Labs   Lab Test 02/28/23  0803   CHOL 74   HDL 37*   LDL 16   TRIG 104     Recent Labs   Lab Test 02/28/23  0803 05/06/22  1537 04/09/21  0900   LDL 16 29 14     Recent Labs   Lab Test 02/28/23  0803      POTASSIUM 4.2   CHLORIDE 108*   CO2 23   *   BUN 18.2   CR 0.95   GFRESTIMATED 88   DANGELO 9.2     Recent Labs   Lab Test 02/28/23  0803 04/09/21  0900 02/23/19  0608   CR 0.95 0.93 0.72     Recent Labs   Lab Test 02/19/19  0715   A1C 5.3          Recent Labs   Lab Test 04/09/21  0900   WBC 7.5   HGB 17.2   HCT 49.8   MCV 93        Recent Labs   Lab Test 04/09/21  0900 02/25/19  0554 02/23/19  0608   HGB 17.2 13.8* 12.7*    Recent Labs   Lab Test 02/18/19  0927 02/18/19  0324 02/17/19  2109   TROPONINI 4.53* 6.39* 4.39*     No results for input(s): BNP, NTBNPI, NTBNP in the last 24816 hours.  No results for input(s): TSH in the last 21051 hours.  Recent Labs   Lab Test 02/21/19  0357 02/20/19  1154 02/19/19  0715   INR 1.24* 1.28* 0.97

## 2023-08-30 NOTE — PATIENT INSTRUCTIONS
Eleuterio Camejo,    It was a pleasure to see you today at the [facility].     My recommendations after this visit include:    Stay active    CHANDAN Lozada MD, FACC, K[place]LYN

## 2023-10-27 ENCOUNTER — MYC REFILL (OUTPATIENT)
Dept: CARDIOLOGY | Facility: CLINIC | Age: 68
End: 2023-10-27
Payer: COMMERCIAL

## 2023-10-27 DIAGNOSIS — E78.5 HYPERLIPIDEMIA: ICD-10-CM

## 2023-10-27 RX ORDER — LISINOPRIL 10 MG/1
10 TABLET ORAL DAILY
Qty: 90 TABLET | Refills: 2 | Status: SHIPPED | OUTPATIENT
Start: 2023-10-27 | End: 2024-07-19

## 2024-04-21 ENCOUNTER — HEALTH MAINTENANCE LETTER (OUTPATIENT)
Age: 69
End: 2024-04-21

## 2024-04-26 ENCOUNTER — TELEPHONE (OUTPATIENT)
Dept: CARDIOLOGY | Facility: CLINIC | Age: 69
End: 2024-04-26
Payer: COMMERCIAL

## 2024-04-26 NOTE — TELEPHONE ENCOUNTER
Central Prior Authorization Team   Phone: [phone]    PA Initiation    Medication: Repatha 140mg/ml  Insurance Company: [employer] - Phone [phone] Fax [phone]  Pharm[id number] [place], [place] - [address]  Filling Pharmacy Phone: [phone]  Filling Pharmacy Fax:    Start Date: 4/26/2024

## 2024-04-26 NOTE — TELEPHONE ENCOUNTER
Prior Authorization Approval    Authorization Effective Date: 4/26/2024  Authorization Expiration Date: 4/26/2025  Medication: Repatha 140mg/ml  Approved Dose/Quantity:   Reference #:     Insurance Company: [employer] - Phone [phone] Fax [phone]  Expected CoPay:       CoPay Card Available:      Foundation Assistance Needed:    Which Pharmacy is filling the prescription (Not needed for infusion/clinic administered): [id number] [place] [address]  Pharmacy Notified:  yes  Patient Notified:  yes- Pharmacy will contact patient when ready to /ship

## 2024-04-26 NOTE — TELEPHONE ENCOUNTER
"PA Initiation    Medication:  Repatha 140mg/ml    Insurance Company:  [employer]  [facility] [place]  Filling Pharmacy Phone:  [phone]  Filling Pharmacy Fax:  [phone]  Start Date:   refilled 4-19-24    \"Visit Fara/priorauthportal enter TRX code [id number]\" or call [phone]  "

## 2024-04-28 DIAGNOSIS — Z95.1 S/P CABG (CORONARY ARTERY BYPASS GRAFT): ICD-10-CM

## 2024-04-29 RX ORDER — METOPROLOL TARTRATE 50 MG
50 TABLET ORAL 2 TIMES DAILY
Qty: 180 TABLET | Refills: 1 | Status: SHIPPED | OUTPATIENT
Start: 2024-04-29

## 2024-05-02 SDOH — HEALTH STABILITY: PHYSICAL HEALTH: ON AVERAGE, HOW MANY DAYS PER WEEK DO YOU ENGAGE IN MODERATE TO STRENUOUS EXERCISE (LIKE A BRISK WALK)?: 3 DAYS

## 2024-05-02 SDOH — HEALTH STABILITY: PHYSICAL HEALTH: ON AVERAGE, HOW MANY MINUTES DO YOU ENGAGE IN EXERCISE AT THIS LEVEL?: 60 MIN

## 2024-05-02 ASSESSMENT — SOCIAL DETERMINANTS OF HEALTH (SDOH): HOW OFTEN DO YOU GET TOGETHER WITH FRIENDS OR RELATIVES?: ONCE A WEEK

## 2024-05-09 ENCOUNTER — OFFICE VISIT (OUTPATIENT)
Dept: FAMILY MEDICINE | Facility: CLINIC | Age: 69
End: 2024-05-09
Attending: NURSE PRACTITIONER
Payer: MEDICARE

## 2024-05-09 VITALS
SYSTOLIC BLOOD PRESSURE: 114 MMHG | DIASTOLIC BLOOD PRESSURE: 82 MMHG | HEIGHT: 67 IN | TEMPERATURE: 97.8 F | OXYGEN SATURATION: 98 % | BODY MASS INDEX: 27.62 KG/M2 | HEART RATE: 63 BPM | WEIGHT: 176 LBS | RESPIRATION RATE: 16 BRPM

## 2024-05-09 DIAGNOSIS — I25.10 CORONARY ARTERY DISEASE INVOLVING NATIVE CORONARY ARTERY OF NATIVE HEART WITHOUT ANGINA PECTORIS: ICD-10-CM

## 2024-05-09 DIAGNOSIS — Z12.5 SCREENING FOR PROSTATE CANCER: ICD-10-CM

## 2024-05-09 DIAGNOSIS — Z00.00 ROUTINE GENERAL MEDICAL EXAMINATION AT A HEALTH CARE FACILITY: Primary | ICD-10-CM

## 2024-05-09 LAB
ERYTHROCYTE [DISTWIDTH] IN BLOOD BY AUTOMATED COUNT: 13.1 % (ref 10–15)
HCT VFR BLD AUTO: 48.4 % (ref 40–53)
HGB BLD-MCNC: 16.5 G/DL (ref 13.3–17.7)
MCH RBC QN AUTO: 32 PG (ref 26.5–33)
MCHC RBC AUTO-ENTMCNC: 34.1 G/DL (ref 31.5–36.5)
MCV RBC AUTO: 94 FL (ref 78–100)
PLATELET # BLD AUTO: 292 10E3/UL (ref 150–450)
RBC # BLD AUTO: 5.15 10E6/UL (ref 4.4–5.9)
WBC # BLD AUTO: 7.3 10E3/UL (ref 4–11)

## 2024-05-09 PROCEDURE — G0103 PSA SCREENING: HCPCS | Performed by: NURSE PRACTITIONER

## 2024-05-09 PROCEDURE — 85027 COMPLETE CBC AUTOMATED: CPT | Performed by: NURSE PRACTITIONER

## 2024-05-09 PROCEDURE — 80053 COMPREHEN METABOLIC PANEL: CPT | Performed by: NURSE PRACTITIONER

## 2024-05-09 PROCEDURE — 99397 PER PM REEVAL EST PAT 65+ YR: CPT | Performed by: NURSE PRACTITIONER

## 2024-05-09 PROCEDURE — 36415 COLL VENOUS BLD VENIPUNCTURE: CPT | Performed by: NURSE PRACTITIONER

## 2024-05-09 PROCEDURE — 80061 LIPID PANEL: CPT | Performed by: NURSE PRACTITIONER

## 2024-05-09 ASSESSMENT — PAIN SCALES - GENERAL: PAINLEVEL: NO PAIN (0)

## 2024-05-09 NOTE — PATIENT INSTRUCTIONS
"Check insurance coverage prior to getting the RSV and shingles shot.        Preventive Care Advice   This is general advice we often give to help people stay healthy. Your care team may have specific advice just for you. Please talk to your care team about your own preventive care needs.  Lifestyle  Exercise at least 150 minutes each week (30 minutes a day, 5 days a week).  Do muscle strengthening activities 2 days a week. These help control your weight and prevent disease.  No smoking.  Wear sunscreen to prevent skin cancer.  Have your home tested for radon every 2 to 5 years. Radon is a colorless, odorless gas that can harm your lungs. To learn more, go to www.health.[facility].[place].us and search for \"Radon in Homes.\"  Keep guns unloaded and locked up in a safe place like a safe or gun vault, or, use a gun lock and hide the keys. Always lock away bullets separately. To learn more, visit [employer].[place].gov and search for \"safe gun storage.\"  Nutrition  Eat 5 or more servings of fruits and vegetables each day.  Try wheat bread, brown rice and whole grain pasta (instead of white bread, rice, and pasta).  Get enough calcium and vitamin D. Check the label on foods and aim for 100% of the RDA (recommended daily allowance).  Regular exams  Have a dental exam and cleaning every 6 months.  See your health care team every year to talk about:  Any changes in your health.  Any medicines your care team has prescribed.  Preventive care, family planning, and ways to prevent chronic diseases.  Shots (vaccines)   HPV shots (up to age 26), if you've never had them before.  Hepatitis B shots (up to age 59), if you've never had them before.  COVID-19 shot: Get this shot when it's due.  Flu shot: Get a flu shot every year.  Tetanus shot: Get a tetanus shot every 10 years.  Pneumococcal, hepatitis A, and RSV shots: Ask your care team if you need these based on your risk.  Shingles shot (for age 50 and up).  General health tests  Diabetes " screening:  Starting at age 35, Get screened for diabetes at least every 3 years.  If you are younger than age 35, ask your care team if you should be screened for diabetes.  Cholesterol test: At age 39, start having a cholesterol test every 5 years, or more often if advised.  Bone density scan (DEXA): At age 50, ask your care team if you should have this scan for osteoporosis (brittle bones).  Hepatitis C: Get tested at least once in your life.  Abdominal aortic aneurysm screening: Talk to your doctor about having this screening if you:  Have ever smoked; and  Are biologically male; and  Are between the ages of 65 and 75.  STIs (sexually transmitted infections)  Before age 24: Ask your care team if you should be screened for STIs.  After age 24: Get screened for STIs if you're at risk. You are at risk for STIs (including HIV) if:  You are sexually active with more than one person.  You don't use condoms every time.  You or a partner was diagnosed with a sexually transmitted infection.  If you are at risk for HIV, ask about PrEP medicine to prevent HIV.  Get tested for HIV at least once in your life, whether you are at risk for HIV or not.  Cancer screening tests  Cervical cancer screening: If you have a cervix, begin getting regular cervical cancer screening tests at age 21. Most people who have regular screenings with normal results can stop after age 65. Talk about this with your provider.  Breast cancer scan (mammogram): If you've ever had breasts, begin having regular mammograms starting at age 40. This is a scan to check for breast cancer.  Colon cancer screening: It is important to start screening for colon cancer at age 45.  Have a colonoscopy test every 10 years (or more often if you're at risk) Or, ask your provider about stool tests like a FIT test every year or Cologuard test every 3 years.  To learn more about your testing options, visit: www.Ready.[employer]/492892.pdf.  For help making a decision, visit:  giovanni.ly/yv25034.  Prostate cancer screening test: If you have a prostate and are age 55 to 69, ask your provider if you would benefit from a yearly prostate cancer screening test.  Lung cancer screening: If you are a current or former smoker age 50 to 80, ask your care team if ongoing lung cancer screenings are right for you.  For informational purposes only. Not to replace the advice of your health care provider. Copyright   2023 [facility]. All rights reserved. Clinically reviewed by the [facility] Transitions Program. flux - neutrinity [id number] - REV 04/24.

## 2024-05-09 NOTE — PROGRESS NOTES
Preventive Care Visit  [facility]  Shawn Lee NP,    May 9, 2024        ICD-10-CM    1. Routine general medical examination at a health care facility  Z00.00       2. Coronary artery disease involving native coronary artery of native heart without angina pectoris  I25.10 Comprehensive metabolic panel (BMP + Alb, Alk Phos, ALT, AST, Total. Bili, TP)     Lipid panel     CBC with platelets     Comprehensive metabolic panel (BMP + Alb, Alk Phos, ALT, AST, Total. Bili, TP)     Lipid panel     CBC with platelets      3. Screening for prostate cancer  Z12.5 PSA, screen     PSA, screen        Doing well. Update labs. Reviewed colonoscopy will be due later this year.  Follow-up with cardiology later this summer.  Reviewed vaccines and healthy lifestyle behaviors.    Conner Pitts is a 68 year old, presenting for the following:  Physical (Pt is fasting. )        5/9/2024     6:51 AM   Additional Questions   Roomed by Kaycee ZAMORA LPN        Health Care Directive  Patient does not have a Health Care Directive or Living Will: completed    HPI    Biking to stay active.   Got a new job. Plans to retire in a couple years now.         5/2/2024   General Health   How would you rate your overall physical health? Excellent   Feel stress (tense, anxious, or unable to sleep) Not at all         5/2/2024   Nutrition   Diet: I don't know         5/2/2024   Exercise   Days per week of moderate/strenous exercise 3 days   Average minutes spent exercising at this level 60 min         5/2/2024   Social Factors   Frequency of gathering with friends or relatives Once a week   Worry food won't last until get money to buy more No   Food not last or not have enough money for food? No   Do you have housing?  Yes   Are you worried about losing your housing? No   Lack of transportation? No   Unable to get utilities (heat,electricity)? No         5/9/2024   Fall Risk   Fallen 2 or more times in the past year? No    Trouble with walking or balance? No          5/2/2024   Activities of Daily Living- Home Safety   Needs help with the following daily activites None of the above   Safety concerns in the home None of the above         5/2/2024   Dental   Dentist two times every year? Yes         5/2/2024   Hearing Screening   Hearing concerns? None of the above         5/2/2024   Driving Risk Screening   Patient/family members have concerns about driving No         5/2/2024   General Alertness/Fatigue Screening   Have you been more tired than usual lately? No         5/2/2024   Urinary Incontinence Screening   Bothered by leaking urine in past 6 months No         5/2/2024   TB Screening   Were you born outside of the [place]? No         Today's PHQ-2 Score:       5/9/2024     6:48 AM   PHQ-2 ( 1999 [employer])   Q1: Little interest or pleasure in doing things 0   Q2: Feeling down, depressed or hopeless 0   PHQ-2 Score 0   Q1: Little interest or pleasure in doing things Not at all   Q2: Feeling down, depressed or hopeless Not at all   PHQ-2 Score 0           5/2/2024   Substance Use   Alcohol more than 3/day or more than 7/wk Not Applicable   Do you have a current opioid prescription? No   How severe/bad is pain from 1 to 10? 0/10 (No Pain)   Do you use any other substances recreationally? No     Social History     Tobacco Use    Smoking status: Never     Passive exposure: Never    Smokeless tobacco: Never   Vaping Use    Vaping status: Never Used   Substance Use Topics    Alcohol use: No    Drug use: No         5/2/2024   AAA Screening   Family history of Abdominal Aortic Aneurysm (AAA)? Unsure   Last PSA:   Prostate Specific Antigen Screen   Date Value Ref Range Status   02/28/2023 4.30 0.00 - 4.50 ng/mL Final     ASCVD Risk   The ASCVD Risk score (Karen DK, et al., 2019) failed to calculate for the following reasons:    The valid total cholesterol range is 130 to 320 mg/dL  Reviewed and updated as needed this visit by  Provider                    Past Medical History:   Diagnosis Date    Acute non Q wave MI (myocardial infarction), initial episode of care (H) 2/18/2019    Coronary artery disease 2003    Stent placement x3    Essential hypertension     Hyperlipidemia     Non-ST elevation myocardial infarction (NSTEMI) (H)     Peptic ulcer disease     Postoperative nausea and vomiting      Past Surgical History:   Procedure Laterality Date    CV CORONARY ANGIOGRAM N/A 02/18/2019    Procedure: Coronary Angiogram;  Surgeon: Sesar Macdonald MD;  Location: [facility] Cath Lab;  Service: Cardiology    CV LEFT HEART CATHETERIZATION WITHOUT LEFT VENTRICULOGRAM Left 02/18/2019    Procedure: Left Heart Catheterization Without Left Ventriculogram;  Surgeon: Sesar Macdonald MD;  Location: [facility] Cath Lab;  Service: Cardiology    SKIN CANCER EXCISION Right     ear    TONSILLECTOMY      TUMOR REMOVAL      Scalp.  Benign.    [facility] CABG, ARTERIAL, FOUR+ N/A 02/20/2019    Procedure: anesthesia, transesophageal echocardiogram, CORONARY ARTERY BYPASS GRAFT X4, left internal mammary artery, endoscopic vein harvest;  Surgeon: Radha Sandoval MD;  Location: [facility] OR;  Service: Cardiovascular    [facility] CORONARY STENT PERCUT, INITIAL VESSEL  01/01/2003    x3- Cath Stent Placement;  Proc Date: 01/01/2003;  Comments: ILZ MID RIGHT POSTEIOR ; ; PROX FIRST DIAGNOL     Current providers sharing in care for this patient include:  Patient Care Team:  Shawn Lee NP as PCP - General  Shawn Lee NP as Assigned PCP  Mehran Lozada MD as Assigned Heart and Vascular Provider    The following health maintenance items are reviewed in Epic and correct as of today:  Health Maintenance   Topic Date Due    ANNUAL REVIEW OF  ORDERS  Never done    ZOSTER IMMUNIZATION (1 of 2) Never done    RSV VACCINE (Pregnancy & 60+) (1 - 1-dose 60+ series) Never done    COVID-19 Vaccine (4 - 2023-24 season) 09/01/2023    MEDICARE ANNUAL  "WELLNESS VISIT  02/28/2024    LIPID  02/28/2024    ADVANCE CARE PLANNING  03/01/2024    INFLUENZA VACCINE (Season Ended) 09/01/2024    FALL RISK ASSESSMENT  05/09/2025    GLUCOSE  02/28/2026    DTAP/TDAP/TD IMMUNIZATION (2 - Td or Tdap) 10/25/2027    COLORECTAL CANCER SCREENING  08/22/2029    HEPATITIS C SCREENING  Completed    PHQ-2 (once per calendar year)  Completed    Pneumococcal Vaccine: 65+ Years  Completed    IPV IMMUNIZATION  Aged Out    HPV IMMUNIZATION  Aged Out    MENINGITIS IMMUNIZATION  Aged Out    RSV MONOCLONAL ANTIBODY  Aged Out         Review of Systems  Constitutional, HEENT, cardiovascular, pulmonary, gi and gu systems are negative, except as otherwise noted.     Objective    Exam  /82   Pulse 63   Temp 97.8  F (36.6  C) (Oral)   Resp 16   Ht 1.689 m (5' 6.5\")   Wt 79.8 kg (176 lb)   SpO2 98%   BMI 27.98 kg/m     Estimated body mass index is 27.98 kg/m  as calculated from the following:    Height as of this encounter: 1.689 m (5' 6.5\").    Weight as of this encounter: 79.8 kg (176 lb).    Physical Exam  GENERAL: alert and no distress  EYES: Eyes grossly normal to inspection, PERRL and conjunctivae and sclerae normal  HENT: ear canals and TM's normal, nose and mouth without ulcers or lesions  NECK: no adenopathy, no asymmetry, masses, or scars  RESP: lungs clear to auscultation - no rales, rhonchi or wheezes  CV: regular rate and rhythm, normal S1 S2, no S3 or S4, no murmur, click or rub, no peripheral edema  ABDOMEN: soft, nontender, no hepatosplenomegaly, no masses and bowel sounds normal  MS: no gross musculoskeletal defects noted, no edema  SKIN: no suspicious lesions or rashes  NEURO: Normal strength and tone, mentation intact and speech normal  PSYCH: mentation appears normal, affect normal/bright        5/9/2024   Mini Cog   Clock Draw Score 0 Abnormal   3 Item Recall 1 object recalled   Mini Cog Total Score 1       Signed Electronically by: Shawn Lee NP    "

## 2024-05-10 ENCOUNTER — TELEPHONE (OUTPATIENT)
Dept: FAMILY MEDICINE | Facility: CLINIC | Age: 69
End: 2024-05-10
Payer: MEDICARE

## 2024-05-10 DIAGNOSIS — R73.03 PREDIABETES: Primary | ICD-10-CM

## 2024-05-10 DIAGNOSIS — R97.20 ELEVATED PROSTATE SPECIFIC ANTIGEN (PSA): ICD-10-CM

## 2024-05-10 LAB
ALBUMIN SERPL BCG-MCNC: 4.4 G/DL (ref 3.5–5.2)
ALP SERPL-CCNC: 67 U/L (ref 40–150)
ALT SERPL W P-5'-P-CCNC: 31 U/L (ref 0–70)
ANION GAP SERPL CALCULATED.3IONS-SCNC: 8 MMOL/L (ref 7–15)
AST SERPL W P-5'-P-CCNC: 30 U/L (ref 0–45)
BILIRUB SERPL-MCNC: 0.5 MG/DL
BUN SERPL-MCNC: 18.4 MG/DL (ref 8–23)
CALCIUM SERPL-MCNC: 8.8 MG/DL (ref 8.8–10.2)
CHLORIDE SERPL-SCNC: 107 MMOL/L (ref 98–107)
CHOLEST SERPL-MCNC: 108 MG/DL
CREAT SERPL-MCNC: 0.85 MG/DL (ref 0.67–1.17)
DEPRECATED HCO3 PLAS-SCNC: 22 MMOL/L (ref 22–29)
EGFRCR SERPLBLD CKD-EPI 2021: >90 ML/MIN/1.73M2
FASTING STATUS PATIENT QL REPORTED: ABNORMAL
FASTING STATUS PATIENT QL REPORTED: ABNORMAL
GLUCOSE SERPL-MCNC: 111 MG/DL (ref 70–99)
HDLC SERPL-MCNC: 38 MG/DL
LDLC SERPL CALC-MCNC: 40 MG/DL
NONHDLC SERPL-MCNC: 70 MG/DL
POTASSIUM SERPL-SCNC: 4.5 MMOL/L (ref 3.4–5.3)
PROT SERPL-MCNC: 6.8 G/DL (ref 6.4–8.3)
PSA SERPL DL<=0.01 NG/ML-MCNC: 4.78 NG/ML (ref 0–4.5)
SODIUM SERPL-SCNC: 137 MMOL/L (ref 135–145)
TRIGL SERPL-MCNC: 149 MG/DL

## 2024-05-10 NOTE — TELEPHONE ENCOUNTER
----- Message from Shawn Lee NP sent at 5/10/2024  7:27 AM CDT -----  Please call.    Couple things.    Blood sugar was a little high in the prediabetes range.  Focus on continuing to stay active, keeping weight under control, and eating a good diet limiting excess of carbohydrates.    Secondly, PSA prostate cancer screen is a little elevated.  We should recheck with a lab only appointment in 2 months and if still elevated will investigate further.    Shawn Lee, CNP

## 2024-05-10 NOTE — TELEPHONE ENCOUNTER
Talked to patient to relay message. He verbalized understanding and stated he will set up an appointment himself for a lab recheck.     Shadi Johnson RN  [facility]

## 2024-05-16 DIAGNOSIS — E78.5 HYPERLIPIDEMIA: ICD-10-CM

## 2024-05-16 RX ORDER — ROSUVASTATIN CALCIUM 40 MG/1
TABLET, COATED ORAL
Qty: 90 TABLET | Refills: 0 | Status: SHIPPED | OUTPATIENT
Start: 2024-05-16

## 2024-07-05 ENCOUNTER — MYC REFILL (OUTPATIENT)
Dept: CARDIOLOGY | Facility: CLINIC | Age: 69
End: 2024-07-05
Payer: COMMERCIAL

## 2024-07-05 DIAGNOSIS — E78.5 HYPERLIPIDEMIA, UNSPECIFIED HYPERLIPIDEMIA TYPE: ICD-10-CM

## 2024-07-15 ENCOUNTER — TELEPHONE (OUTPATIENT)
Dept: CARDIOLOGY | Facility: CLINIC | Age: 69
End: 2024-07-15
Payer: COMMERCIAL

## 2024-07-16 NOTE — TELEPHONE ENCOUNTER
Central Prior Authorization Team   Phone: [phone]    PA Initiation    Medication: Repatha 140mg/ml  Insurance Company: [employer] - Phone [phone] Fax [phone]  Pharmacy Filling the Rx: [id number] [place], [place] - [address]  Filling Pharmacy Phone: [phone]  Filling Pharmacy Fax:    Start Date: 7/16/2024

## 2024-07-16 NOTE — TELEPHONE ENCOUNTER
Received a fax stating that Repatha is [employer] formulary list, but requires prior authorization. Will route to PA team. -[facility]            Murray Pickett!  This patient's repatha renewal requires an updated PA. Thank you for your help!  Arlette

## 2024-07-17 NOTE — TELEPHONE ENCOUNTER
Prior Authorization Approval    Authorization Effective Date: 6/1/2024  Authorization Expiration Date: 7/16/2025  Medication: Repatha 140mg/ml  Approved Dose/Quantity:   Reference #:     Insurance Company: [employer] - Phone [phone] Fax [phone]  Expected CoPay:       CoPay Card Available:      Foundation Assistance Needed:    Which Pharmacy is filling the prescription (Not needed for infusion/clinic administered): [id number] [place] [address]  Pharmacy Notified:  yes  Patient Notified:  yes- Pharmacy will contact patient when ready to /ship

## 2024-07-19 ENCOUNTER — MYC REFILL (OUTPATIENT)
Dept: CARDIOLOGY | Facility: CLINIC | Age: 69
End: 2024-07-19
Payer: COMMERCIAL

## 2024-07-19 DIAGNOSIS — E78.5 HYPERLIPIDEMIA: ICD-10-CM

## 2024-07-22 RX ORDER — LISINOPRIL 10 MG/1
10 TABLET ORAL DAILY
Qty: 90 TABLET | Refills: 0 | Status: SHIPPED | OUTPATIENT
Start: 2024-07-22

## 2024-08-06 ENCOUNTER — TRANSFERRED RECORDS (OUTPATIENT)
Dept: HEALTH INFORMATION MANAGEMENT | Facility: CLINIC | Age: 69
End: 2024-08-06
Payer: COMMERCIAL

## 2024-10-24 DIAGNOSIS — Z95.1 S/P CABG (CORONARY ARTERY BYPASS GRAFT): ICD-10-CM

## 2024-10-25 RX ORDER — METOPROLOL TARTRATE 50 MG
50 TABLET ORAL 2 TIMES DAILY
Qty: 180 TABLET | Refills: 0 | Status: SHIPPED | OUTPATIENT
Start: 2024-10-25

## 2024-10-28 ENCOUNTER — MYC REFILL (OUTPATIENT)
Dept: CARDIOLOGY | Facility: CLINIC | Age: 69
End: 2024-10-28
Payer: COMMERCIAL

## 2024-10-28 DIAGNOSIS — E78.5 HYPERLIPIDEMIA: ICD-10-CM

## 2024-10-29 RX ORDER — LISINOPRIL 10 MG/1
10 TABLET ORAL DAILY
Qty: 90 TABLET | Refills: 0 | Status: SHIPPED | OUTPATIENT
Start: 2024-10-29

## 2024-10-29 RX ORDER — ROSUVASTATIN CALCIUM 40 MG/1
40 TABLET, COATED ORAL DAILY
Qty: 90 TABLET | Refills: 0 | Status: SHIPPED | OUTPATIENT
Start: 2024-10-29

## 2025-01-20 ENCOUNTER — LAB (OUTPATIENT)
Dept: LAB | Facility: CLINIC | Age: 70
End: 2025-01-20
Payer: COMMERCIAL

## 2025-01-20 DIAGNOSIS — R97.20 ELEVATED PROSTATE SPECIFIC ANTIGEN (PSA): ICD-10-CM

## 2025-01-20 DIAGNOSIS — R73.03 PREDIABETES: ICD-10-CM

## 2025-01-20 LAB
EST. AVERAGE GLUCOSE BLD GHB EST-MCNC: 114 MG/DL
HBA1C MFR BLD: 5.6 % (ref 0–5.6)
PSA SERPL DL<=0.01 NG/ML-MCNC: 5.61 NG/ML (ref 0–4.5)

## 2025-01-20 PROCEDURE — 84153 ASSAY OF PSA TOTAL: CPT

## 2025-01-20 PROCEDURE — 36415 COLL VENOUS BLD VENIPUNCTURE: CPT

## 2025-01-20 PROCEDURE — 83036 HEMOGLOBIN GLYCOSYLATED A1C: CPT

## 2025-01-21 DIAGNOSIS — R97.20 ELEVATED PROSTATE SPECIFIC ANTIGEN (PSA): Primary | ICD-10-CM

## 2025-01-23 DIAGNOSIS — E78.5 HYPERLIPIDEMIA: ICD-10-CM

## 2025-01-23 RX ORDER — LISINOPRIL 10 MG/1
10 TABLET ORAL DAILY
Qty: 90 TABLET | Refills: 0 | Status: SHIPPED | OUTPATIENT
Start: 2025-01-23

## 2025-01-23 RX ORDER — ROSUVASTATIN CALCIUM 40 MG/1
40 TABLET, COATED ORAL DAILY
Qty: 90 TABLET | Refills: 0 | Status: SHIPPED | OUTPATIENT
Start: 2025-01-23

## 2025-02-07 ENCOUNTER — MYC REFILL (OUTPATIENT)
Dept: CARDIOLOGY | Facility: CLINIC | Age: 70
End: 2025-02-07
Payer: COMMERCIAL

## 2025-02-07 DIAGNOSIS — E78.5 HYPERLIPIDEMIA, UNSPECIFIED HYPERLIPIDEMIA TYPE: ICD-10-CM

## 2025-02-20 ENCOUNTER — OFFICE VISIT (OUTPATIENT)
Dept: CARDIOLOGY | Facility: CLINIC | Age: 70
End: 2025-02-20
Attending: INTERNAL MEDICINE
Payer: COMMERCIAL

## 2025-02-20 ENCOUNTER — VIRTUAL VISIT (OUTPATIENT)
Dept: UROLOGY | Facility: CLINIC | Age: 70
End: 2025-02-20
Attending: NURSE PRACTITIONER
Payer: COMMERCIAL

## 2025-02-20 VITALS
WEIGHT: 173.5 LBS | HEIGHT: 66 IN | BODY MASS INDEX: 27.88 KG/M2 | DIASTOLIC BLOOD PRESSURE: 76 MMHG | RESPIRATION RATE: 16 BRPM | SYSTOLIC BLOOD PRESSURE: 126 MMHG | HEART RATE: 64 BPM

## 2025-02-20 DIAGNOSIS — Z95.1 S/P CABG (CORONARY ARTERY BYPASS GRAFT): Primary | ICD-10-CM

## 2025-02-20 DIAGNOSIS — R97.20 ELEVATED PROSTATE SPECIFIC ANTIGEN (PSA): ICD-10-CM

## 2025-02-20 NOTE — LETTER
"2/20/2025    Shawn Lee, NP  [address] [place] [address]    RE: Eleuterio Camejo       Dear Colleague,     I had the pleasure of seeing Eleuterio Camejo in the [facility].      Cardiology Progress Note     Assessment:  Coronary artery disease, status post non-ST segment elevation myocardial infarction and coronary artery bypass graft surgery(LIMA to LAD and vein grafts to diagonal branch, obtuse marginal 3 and RPDA) in February 2019, stable, no angina  Hypercholesterolemia, on Crestor, Repatha, excellent control  Hypertension, good control      Plan:  Continue current cardiac medications    Follow-up in 1 year   The longitudinal plan of care for the diagnosis(es)/condition(s) as documented were addressed during this visit. Due to the added complexity in care, I will continue to support Hong in the subsequent management and with ongoing continuity of care.   Subjective:   This is 69 year old male who comes in today follow-up visit.  He has done well.  He denies chest pain or shortness of breath.  He engages in high intensity exercising such as rockclimDibbz.  Unfortunately he fell down from the rock last fall and partially tore hamstring.  He did not require surgery.  He is back to his normal physical activities.  He tolerates medication well.  He is hoping to retire next year  Review of Systems:   Negative other than history of present illness    Objective:   /76 (BP Location: Left arm, Patient Position: Sitting, Cuff Size: Adult Regular)   Pulse 64   Resp 16   Ht 1.676 m (5' 6\")   Wt 78.7 kg (173 lb 8 oz)   BMI 28.00 kg/m    Physical Exam:  GENERAL: no distress  NECK: No JVD  LUNGS: Clear to auscultation.  CARDIAC: regular rhythm, S1 & S2 normal.  No heaves, thrills, gallops or murmurs.  ABDOMEN: flat, negative hepatosplenomegaly, soft and non-tender.  EXTREMITIES: No evidence of cyanosis, clubbing or edema.    Current Outpatient Medications   Medication Sig Dispense " Refill     aspirin 81 mg chewable tablet [ASPIRIN 81 MG CHEWABLE TABLET] Chew 81 mg daily.       evolocumab (REPATHA) 140 MG/ML prefilled autoinjector Inject 1 mL (140 mg) subcutaneously every 14 days. 2 mL 0     lisinopril (ZESTRIL) 10 MG tablet TAKE 1 TABLET (10 MG) BY MOUTH DAILY. 90 tablet 0     metoprolol tartrate (LOPRESSOR) 50 MG tablet TAKE 1 TABLET BY MOUTH TWICE A  tablet 1     rosuvastatin (CRESTOR) 40 MG tablet TAKE 1 TABLET BY MOUTH EVERY DAY 90 tablet 0       Cardiographics:    Echocardiogram: February 2019  Left ventricle ejection fraction is normal. The calculated left ventricular ejection fraction is 66%.  Normal left ventricular size and systolic function.  Normal right ventricular size and systolic function.  Mild mitral regurgitation        Coronary angio: February 2019  LM normal  LAD mod to severe dz in prox LAD; severe dz in mid with JANEL 2 flow into LAD with collaterals via RCA; first diagonal stent 100%  with collaterals  Circ OM3 small 100% with collaterals  RCA mild to mod diffuse dz with stent in %  with collaterals via LAD     LVEDP 11mmHg     Lab Results    Chemistry/lipid CBC Cardiac Enzymes/BNP/TSH/INR   Recent Labs   Lab Test 05/09/24  0733   CHOL 108   HDL 38*   LDL 40   TRIG 149     Recent Labs   Lab Test 05/09/24  0733 02/28/23  0803 05/06/22  1537   LDL 40 16 29     Recent Labs   Lab Test 05/09/24  0733      POTASSIUM 4.5   CHLORIDE 107   CO2 22   *   BUN 18.4   CR 0.85   GFRESTIMATED >90   DANGELO 8.8     Recent Labs   Lab Test 05/09/24  0733 02/28/23  0803 04/09/21  0900   CR 0.85 0.95 0.93     Recent Labs   Lab Test 01/20/25  0847 02/19/19  0715   A1C 5.6 5.3          Recent Labs   Lab Test 05/09/24  0733   WBC 7.3   HGB 16.5   HCT 48.4   MCV 94        Recent Labs   Lab Test 05/09/24  0733 04/09/21  0900 02/25/19  0554   HGB 16.5 17.2 13.8*    Recent Labs   Lab Test 02/18/19  0927 02/18/19  0324 02/17/19  2109   TROPONINI 4.53* 6.39* 4.39*  "    No results for input(s): \"BNP\", \"NTBNPI\", \"NTBNP\" in the last 17784 hours.  No results for input(s): \"TSH\" in the last 90960 hours.  Recent Labs   Lab Test 02/21/19  0357 02/20/19  1154 02/19/19  0715   INR 1.24* 1.28* 0.97                         Thank you for allowing me to participate in the care of your patient.      Sincerely,     Michael Lozada MD     [facility]  cc:   Mehran Lozada MD  [address]  [place] [address]      "

## 2025-02-20 NOTE — LETTER
2/20/2025       RE: Eleuterio Camejo  [address] [place] [address]     Dear Colleague,    Thank you for referring your patient, Eleuterio Camejo, to the [facility] [place] [facility]. Please see a copy of my visit note below.    [facility]  Main Office: [address]  [address]  [place] [address]       CHIEF COMPLAINT:  Elevated PSA    HISTORY:   I was asked by Shawn Lee NP, to see this 69-year-old gentleman who presents with an elevated PSA.  His PSA was 4.3 in 2023, 4.78 in 2024, and recently was 5.61.  He has no urinary symptoms or complaints.  He has nocturia x 1.  He has no family history of prostate cancer.  No history of gross hematuria or infections.      PAST MEDICAL HISTORY:   Past Medical History:   Diagnosis Date     Acute non Q wave MI (myocardial infarction), initial episode of care (H) 2/18/2019     Coronary artery disease 2003    Stent placement x3     Essential hypertension      Hyperlipidemia      Non-ST elevation myocardial infarction (NSTEMI) (H)      Peptic ulcer disease      Postoperative nausea and vomiting        PAST SURGICAL HISTORY:   Past Surgical History:   Procedure Laterality Date     CV CORONARY ANGIOGRAM N/A 02/18/2019    Procedure: Coronary Angiogram;  Surgeon: Sesar Macdonald MD;  Location: [facility] Cath Lab;  Service: Cardiology     CV LEFT HEART CATHETERIZATION WITHOUT LEFT VENTRICULOGRAM Left 02/18/2019    Procedure: Left Heart Catheterization Without Left Ventriculogram;  Surgeon: Sesar Macdonald MD;  Location: [facility] Cath Lab;  Service: Cardiology     SKIN CANCER EXCISION Right     ear     TONSILLECTOMY       TUMOR REMOVAL      Scalp.  Benign.     [facility]C CABG, ARTERIAL, FOUR+ N/A 02/20/2019    Procedure: anesthesia, transesophageal echocardiogram, CORONARY ARTERY BYPASS GRAFT X4, left internal mammary artery, endoscopic vein harvest;  Surgeon: Jaime  Radha YEE MD;  Location: [facility];  Service: Cardiovascular     [facility] CORONARY STENT PERCUT, INITIAL VESSEL  01/01/2003    x3- Cath Stent Placement;  Proc Date: 01/01/2003;  Comments: LIZ MID RIGHT POSTEIOR ; ; PROX FIRST DIAGNOL       FAMILY HISTORY:   Family History   Problem Relation Age of Onset     Coronary Artery Disease Father 42     Hyperlipidemia Father      Hyperlipidemia Sister      Heart Disease Paternal Grandfather        SOCIAL HISTORY:   Social History     Tobacco Use     Smoking status: Never     Passive exposure: Never     Smokeless tobacco: Never   Substance Use Topics     Alcohol use: No          Allergies   Allergen Reactions     Amoxicillin Anaphylaxis and Swelling     Penicillins Anaphylaxis         Current Outpatient Medications:      aspirin 81 mg chewable tablet, [ASPIRIN 81 MG CHEWABLE TABLET] Chew 81 mg daily., Disp: , Rfl:      evolocumab (REPATHA) 140 MG/ML prefilled autoinjector, Inject 1 mL (140 mg) subcutaneously every 14 days., Disp: 2 mL, Rfl: 0     lisinopril (ZESTRIL) 10 MG tablet, TAKE 1 TABLET (10 MG) BY MOUTH DAILY., Disp: 90 tablet, Rfl: 0     metoprolol tartrate (LOPRESSOR) 50 MG tablet, TAKE 1 TABLET BY MOUTH TWICE A DAY, Disp: 180 tablet, Rfl: 1     rosuvastatin (CRESTOR) 40 MG tablet, TAKE 1 TABLET BY MOUTH EVERY DAY, Disp: 90 tablet, Rfl: 0    Review Of Systems:  Skin: No rash, pruritis, or skin pigmentation  Eyes: No changes in vision  Ears/Nose/Throat: No changes in hearing, no nosebleeds  Respiratory: No shortness of breath, dyspnea on exertion, cough, or hemoptysis  Cardiovascular: No chest pain or palpitations  Gastrointestinal: No diarrhea or constipation. No abdominal pain. No hematochezia  Genitourinary: see HPI  Musculoskeletal: No pain or swelling of joints, normal range of motion  Neurologic: No weakness or tremors  Psychiatric: No recent changes in memory or mood  Hematologic/Lymphatic/Immunologic: No easy bruising or enlarged lymph nodes  Endocrine:  "No weight gain or loss      PHYSICAL EXAM:    General: Alert and oriented to time, place, and self. In NAD   HEENT: Head AT/NC, EOMI, CN Grossly intact   Lungs: no respiratory distress, or pursed lip breathing   Heart: No obvious jugular venous distension present   Musculoskeltal: Normal movements. Normal appearing musculature  Skin: no suspicious lesions or rashes   Neuro: Alert, oriented, speech and mentation normal; moving all 4 extremities equally.   Psych: affect and mood normal      PSA: 5.61    UA RESULTS:  No results for input(s): \"COLOR\", \"APPEARANCE\", \"URINEGLC\", \"URINEBILI\", \"URINEKETONE\", \"SG\", \"UBLD\", \"URINEPH\", \"PROTEIN\", \"UROBILINOGEN\", \"NITRITE\", \"LEUKEST\", \"RBCU\", \"WBCU\" in the last 29195 hours.    Bladder Scan:     Other Labs:      Imaging Studies: None      CLINICAL IMPRESSION:   Elevated PSA    PLAN:   His PSA is elevated and has risen over the last 2 years.  We discussed the concern that he may be developing an early prostate cancer.  I recommended an MRI of the prostate at this time with a possible prostate biopsy, depending on the MRI results.  We discussed both test in detail and he wishes to proceed.    I will have him get the MRI of the prostate performed and then I will see him back in clinic for discussion of results and urinalysis and bladder scan.      Anoop Josue MD      Virtual Visit Details    Type of service:  Video Visit   Video Start Time: 8:02 AM  Video End Time:8:10 AM    Originating Location (pt. Location): Home    Distant Location (provider location):  On-site  Platform used for Video Visit: Kimmie      Again, thank you for allowing me to participate in the care of your patient.      Sincerely,    Anoop Josue MD    "

## 2025-02-20 NOTE — PROGRESS NOTES
[facility]  Main Office: [address]  [address]  [place] [address]       CHIEF COMPLAINT:  Elevated PSA    HISTORY:   I was asked by Shawn Lee NP, to see this 69-year-old gentleman who presents with an elevated PSA.  His PSA was 4.3 in 2023, 4.78 in 2024, and recently was 5.61.  He has no urinary symptoms or complaints.  He has nocturia x 1.  He has no family history of prostate cancer.  No history of gross hematuria or infections.      PAST MEDICAL HISTORY:   Past Medical History:   Diagnosis Date    Acute non Q wave MI (myocardial infarction), initial episode of care (H) 2/18/2019    Coronary artery disease 2003    Stent placement x3    Essential hypertension     Hyperlipidemia     Non-ST elevation myocardial infarction (NSTEMI) (H)     Peptic ulcer disease     Postoperative nausea and vomiting        PAST SURGICAL HISTORY:   Past Surgical History:   Procedure Laterality Date    CV CORONARY ANGIOGRAM N/A 02/18/2019    Procedure: Coronary Angiogram;  Surgeon: Sesar Macdonald MD;  Location: [facility] Cath Lab;  Service: Cardiology    CV LEFT HEART CATHETERIZATION WITHOUT LEFT VENTRICULOGRAM Left 02/18/2019    Procedure: Left Heart Catheterization Without Left Ventriculogram;  Surgeon: Sesar Macdonald MD;  Location: [facility] Cath Lab;  Service: Cardiology    SKIN CANCER EXCISION Right     ear    TONSILLECTOMY      TUMOR REMOVAL      Scalp.  Benign.    [facility] CABG, ARTERIAL, FOUR+ N/A 02/20/2019    Procedure: anesthesia, transesophageal echocardiogram, CORONARY ARTERY BYPASS GRAFT X4, left internal mammary artery, endoscopic vein harvest;  Surgeon: Radha Sandoval MD;  Location: [facility] OR;  Service: Cardiovascular    [facility] CORONARY STENT PERCUT, INITIAL VESSEL  01/01/2003    x3- Cath Stent Placement;  Proc Date: 01/01/2003;  Comments: LIZ MID RIGHT POSTEIOR ; ; PROX FIRST DIAGNOL       FAMILY HISTORY:   Family History   Problem Relation Age of Onset    Coronary Artery  Disease Father 42    Hyperlipidemia Father     Hyperlipidemia Sister     Heart Disease Paternal Grandfather        SOCIAL HISTORY:   Social History     Tobacco Use    Smoking status: Never     Passive exposure: Never    Smokeless tobacco: Never   Substance Use Topics    Alcohol use: No          Allergies   Allergen Reactions    Amoxicillin Anaphylaxis and Swelling    Penicillins Anaphylaxis         Current Outpatient Medications:     aspirin 81 mg chewable tablet, [ASPIRIN 81 MG CHEWABLE TABLET] Chew 81 mg daily., Disp: , Rfl:     evolocumab (REPATHA) 140 MG/ML prefilled autoinjector, Inject 1 mL (140 mg) subcutaneously every 14 days., Disp: 2 mL, Rfl: 0    lisinopril (ZESTRIL) 10 MG tablet, TAKE 1 TABLET (10 MG) BY MOUTH DAILY., Disp: 90 tablet, Rfl: 0    metoprolol tartrate (LOPRESSOR) 50 MG tablet, TAKE 1 TABLET BY MOUTH TWICE A DAY, Disp: 180 tablet, Rfl: 1    rosuvastatin (CRESTOR) 40 MG tablet, TAKE 1 TABLET BY MOUTH EVERY DAY, Disp: 90 tablet, Rfl: 0    Review Of Systems:  Skin: No rash, pruritis, or skin pigmentation  Eyes: No changes in vision  Ears/Nose/Throat: No changes in hearing, no nosebleeds  Respiratory: No shortness of breath, dyspnea on exertion, cough, or hemoptysis  Cardiovascular: No chest pain or palpitations  Gastrointestinal: No diarrhea or constipation. No abdominal pain. No hematochezia  Genitourinary: see HPI  Musculoskeletal: No pain or swelling of joints, normal range of motion  Neurologic: No weakness or tremors  Psychiatric: No recent changes in memory or mood  Hematologic/Lymphatic/Immunologic: No easy bruising or enlarged lymph nodes  Endocrine: No weight gain or loss      PHYSICAL EXAM:    General: Alert and oriented to time, place, and self. In NAD   HEENT: Head AT/NC, EOMI, CN Grossly intact   Lungs: no respiratory distress, or pursed lip breathing   Heart: No obvious jugular venous distension present   Musculoskeltal: Normal movements. Normal appearing musculature  Skin: no  "suspicious lesions or rashes   Neuro: Alert, oriented, speech and mentation normal; moving all 4 extremities equally.   Psych: affect and mood normal      PSA: 5.61    UA RESULTS:  No results for input(s): \"COLOR\", \"APPEARANCE\", \"URINEGLC\", \"URINEBILI\", \"URINEKETONE\", \"SG\", \"UBLD\", \"URINEPH\", \"PROTEIN\", \"UROBILINOGEN\", \"NITRITE\", \"LEUKEST\", \"RBCU\", \"WBCU\" in the last 40249 hours.    Bladder Scan:     Other Labs:      Imaging Studies: None      CLINICAL IMPRESSION:   Elevated PSA    PLAN:   His PSA is elevated and has risen over the last 2 years.  We discussed the concern that he may be developing an early prostate cancer.  I recommended an MRI of the prostate at this time with a possible prostate biopsy, depending on the MRI results.  We discussed both test in detail and he wishes to proceed.    I will have him get the MRI of the prostate performed and then I will see him back in clinic for discussion of results and urinalysis and bladder scan.      Anoop Josue MD      Virtual Visit Details    Type of service:  Video Visit   Video Start Time: 8:02 AM  Video End Time:8:10 AM    Originating Location (pt. Location): Home    Distant Location (provider location):  On-site  Platform used for Video Visit: Kimmie"

## 2025-02-20 NOTE — PROGRESS NOTES
"    Cardiology Progress Note     Assessment:  Coronary artery disease, status post non-ST segment elevation myocardial infarction and coronary artery bypass graft surgery(LIMA to LAD and vein grafts to diagonal branch, obtuse marginal 3 and RPDA) in February 2019, stable, no angina  Hypercholesterolemia, on Crestor, Repatha, excellent control  Hypertension, good control      Plan:  Continue current cardiac medications    Follow-up in 1 year   The longitudinal plan of care for the diagnosis(es)/condition(s) as documented were addressed during this visit. Due to the added complexity in care, I will continue to support Hong in the subsequent management and with ongoing continuity of care.   Subjective:   This is 69 year old male who comes in today follow-up visit.  He has done well.  He denies chest pain or shortness of breath.  He engages in high intensity exercising such as rockclimbing.  Unfortunately he fell down from the rock last fall and partially tore hamstring.  He did not require surgery.  He is back to his normal physical activities.  He tolerates medication well.  He is hoping to retire next year  Review of Systems:   Negative other than history of present illness    Objective:   /76 (BP Location: Left arm, Patient Position: Sitting, Cuff Size: Adult Regular)   Pulse 64   Resp 16   Ht 1.676 m (5' 6\")   Wt 78.7 kg (173 lb 8 oz)   BMI 28.00 kg/m    Physical Exam:  GENERAL: no distress  NECK: No JVD  LUNGS: Clear to auscultation.  CARDIAC: regular rhythm, S1 & S2 normal.  No heaves, thrills, gallops or murmurs.  ABDOMEN: flat, negative hepatosplenomegaly, soft and non-tender.  EXTREMITIES: No evidence of cyanosis, clubbing or edema.    Current Outpatient Medications   Medication Sig Dispense Refill    aspirin 81 mg chewable tablet [ASPIRIN 81 MG CHEWABLE TABLET] Chew 81 mg daily.      evolocumab (REPATHA) 140 MG/ML prefilled autoinjector Inject 1 mL (140 mg) subcutaneously every 14 days. 2 mL 0    " "lisinopril (ZESTRIL) 10 MG tablet TAKE 1 TABLET (10 MG) BY MOUTH DAILY. 90 tablet 0    metoprolol tartrate (LOPRESSOR) 50 MG tablet TAKE 1 TABLET BY MOUTH TWICE A  tablet 1    rosuvastatin (CRESTOR) 40 MG tablet TAKE 1 TABLET BY MOUTH EVERY DAY 90 tablet 0       Cardiographics:    Echocardiogram: February 2019  Left ventricle ejection fraction is normal. The calculated left ventricular ejection fraction is 66%.  Normal left ventricular size and systolic function.  Normal right ventricular size and systolic function.  Mild mitral regurgitation        Coronary angio: February 2019  LM normal  LAD mod to severe dz in prox LAD; severe dz in mid with JANEL 2 flow into LAD with collaterals via RCA; first diagonal stent 100%  with collaterals  Circ OM3 small 100% with collaterals  RCA mild to mod diffuse dz with stent in %  with collaterals via LAD     LVEDP 11mmHg     Lab Results    Chemistry/lipid CBC Cardiac Enzymes/BNP/TSH/INR   Recent Labs   Lab Test 05/09/24  0733   CHOL 108   HDL 38*   LDL 40   TRIG 149     Recent Labs   Lab Test 05/09/24  0733 02/28/23  0803 05/06/22  1537   LDL 40 16 29     Recent Labs   Lab Test 05/09/24  0733      POTASSIUM 4.5   CHLORIDE 107   CO2 22   *   BUN 18.4   CR 0.85   GFRESTIMATED >90   DANGELO 8.8     Recent Labs   Lab Test 05/09/24  0733 02/28/23  0803 04/09/21  0900   CR 0.85 0.95 0.93     Recent Labs   Lab Test 01/20/25  0847 02/19/19  0715   A1C 5.6 5.3          Recent Labs   Lab Test 05/09/24  0733   WBC 7.3   HGB 16.5   HCT 48.4   MCV 94        Recent Labs   Lab Test 05/09/24  0733 04/09/21  0900 02/25/19  0554   HGB 16.5 17.2 13.8*    Recent Labs   Lab Test 02/18/19  0927 02/18/19  0324 02/17/19  2109   TROPONINI 4.53* 6.39* 4.39*     No results for input(s): \"BNP\", \"NTBNPI\", \"NTBNP\" in the last 76152 hours.  No results for input(s): \"TSH\" in the last 09136 hours.  Recent Labs   Lab Test 02/21/19  0357 02/20/19  1154 02/19/19  0715   INR 1.24* " 1.28* 0.97

## 2025-02-20 NOTE — NURSING NOTE
Current patient location: [address] [place] [id number]    Is the patient currently in the state of [place]? YES    Visit mode: VIDEO    If the visit is dropped, the patient can be reconnected by:VIDEO VISIT: Text to cell phone:   Telephone Information:   Mobile [phone]       Will anyone else be joining the visit? NO  (If patient encounters technical issues they should call [phone]    Are changes needed to the allergy or medication list? No    Are refills needed on medications prescribed by this physician? NO    Rooming Documentation:  Not applicable    Reason for visit: Consult    Delmi GENTILE

## 2025-03-04 ENCOUNTER — TELEPHONE (OUTPATIENT)
Dept: UROLOGY | Facility: CLINIC | Age: 70
End: 2025-03-04
Payer: COMMERCIAL

## 2025-03-04 DIAGNOSIS — E78.5 HYPERLIPIDEMIA, UNSPECIFIED HYPERLIPIDEMIA TYPE: ICD-10-CM

## 2025-03-04 RX ORDER — EVOLOCUMAB 140 MG/ML
INJECTION, SOLUTION SUBCUTANEOUS
Qty: 6 ML | Refills: 2 | Status: SHIPPED | OUTPATIENT
Start: 2025-03-04

## 2025-03-04 NOTE — TELEPHONE ENCOUNTER
Health Call Center    Phone Message    May a detailed message be left on voicemail: no     Reason for Call: Yvonne with [employer] is calling to let us know the following information regarding patients approval of MRI.    Code: 67952  Approval 02/25/2025-04/30/2025  Auth-[id number]    Yvonne was unable to provide any call back number, but told us to check the provider services number on back of card.    Action Taken: Other: [facility] - Urology    Travel Screening: Not Applicable     Date of Service:

## 2025-03-07 ENCOUNTER — ANCILLARY PROCEDURE (OUTPATIENT)
Dept: MRI IMAGING | Facility: CLINIC | Age: 70
End: 2025-03-07
Attending: UROLOGY
Payer: COMMERCIAL

## 2025-03-07 DIAGNOSIS — R97.20 ELEVATED PROSTATE SPECIFIC ANTIGEN (PSA): ICD-10-CM

## 2025-03-07 PROCEDURE — A9585 GADOBUTROL INJECTION: HCPCS | Performed by: UROLOGY

## 2025-03-07 PROCEDURE — 72197 MRI PELVIS W/O & W/DYE: CPT

## 2025-03-07 PROCEDURE — 255N000002 HC RX 255 OP 636: Performed by: UROLOGY

## 2025-03-07 PROCEDURE — 72197 MRI PELVIS W/O & W/DYE: CPT | Mod: 26 | Performed by: RADIOLOGY

## 2025-03-07 RX ORDER — GADOBUTROL 604.72 MG/ML
8 INJECTION INTRAVENOUS ONCE
Status: COMPLETED | OUTPATIENT
Start: 2025-03-07 | End: 2025-03-07

## 2025-03-07 RX ADMIN — GADOBUTROL 8 ML: 604.72 INJECTION INTRAVENOUS at 10:41

## 2025-03-10 ENCOUNTER — OFFICE VISIT (OUTPATIENT)
Dept: UROLOGY | Facility: CLINIC | Age: 70
End: 2025-03-10
Payer: COMMERCIAL

## 2025-03-10 VITALS
BODY MASS INDEX: 27.15 KG/M2 | SYSTOLIC BLOOD PRESSURE: 150 MMHG | HEIGHT: 67 IN | WEIGHT: 173 LBS | DIASTOLIC BLOOD PRESSURE: 78 MMHG

## 2025-03-10 DIAGNOSIS — R97.20 ELEVATED PROSTATE SPECIFIC ANTIGEN (PSA): Primary | ICD-10-CM

## 2025-03-10 LAB
ALBUMIN UR-MCNC: NEGATIVE MG/DL
APPEARANCE UR: CLEAR
BILIRUB UR QL STRIP: NEGATIVE
COLOR UR AUTO: YELLOW
GLUCOSE UR STRIP-MCNC: NEGATIVE MG/DL
HGB UR QL STRIP: NEGATIVE
KETONES UR STRIP-MCNC: NEGATIVE MG/DL
LEUKOCYTE ESTERASE UR QL STRIP: NEGATIVE
NITRATE UR QL: NEGATIVE
PH UR STRIP: 5.5 [PH] (ref 5–7)
RESIDUAL VOLUME (RV) (EXTERNAL): 65
SP GR UR STRIP: >=1.03 (ref 1–1.03)
UROBILINOGEN UR STRIP-ACNC: 0.2 E.U./DL

## 2025-03-10 PROCEDURE — 3078F DIAST BP <80 MM HG: CPT | Performed by: UROLOGY

## 2025-03-10 PROCEDURE — 3077F SYST BP >= 140 MM HG: CPT | Performed by: UROLOGY

## 2025-03-10 PROCEDURE — 99214 OFFICE O/P EST MOD 30 MIN: CPT | Performed by: UROLOGY

## 2025-03-10 PROCEDURE — 1126F AMNT PAIN NOTED NONE PRSNT: CPT | Performed by: UROLOGY

## 2025-03-10 PROCEDURE — 81003 URINALYSIS AUTO W/O SCOPE: CPT | Mod: QW | Performed by: UROLOGY

## 2025-03-10 PROCEDURE — 51798 US URINE CAPACITY MEASURE: CPT | Performed by: UROLOGY

## 2025-03-10 RX ORDER — CIPROFLOXACIN 500 MG/1
500 TABLET, FILM COATED ORAL 2 TIMES DAILY
Qty: 6 TABLET | Refills: 0 | Status: SHIPPED | OUTPATIENT
Start: 2025-03-10 | End: 2025-03-13

## 2025-03-10 ASSESSMENT — PAIN SCALES - GENERAL: PAINLEVEL_OUTOF10: NO PAIN (0)

## 2025-03-10 NOTE — NURSING NOTE
Chief Complaint   Patient presents with    Elevated PSA     Pt denies gross hematuria or dysuria.    PVR: 65 mL by bladder scan    Antonietta Murray, Clinic Assistant

## 2025-03-10 NOTE — LETTER
3/10/2025       RE: Eleuterio Camejo  [address] [address]     Dear Colleague,    Thank you for referring your patient, Eleuterio Camejo, to the [facility] [place] at [facility]. Please see a copy of my visit note below.    Office Visit Note  [facility]  [phone]    UROLOGIC DIAGNOSES:   Elevated PSA  Enlarged prostate    CURRENT INTERVENTIONS:   MRI prostate    HISTORY:   Eleuterio an MRI of the prostate that showed an enlarged prostate measuring 100 g.  There was a single PI-RADS 4 lesion located on the right side of the prostate.  He has nocturia 2 to 3 times nightly but otherwise no urinary symptoms or complaints.      PAST MEDICAL HISTORY:   Past Medical History:   Diagnosis Date     Acute non Q wave MI (myocardial infarction), initial episode of care (H) 2/18/2019     Coronary artery disease 2003    Stent placement x3     Essential hypertension      Hyperlipidemia      Non-ST elevation myocardial infarction (NSTEMI) (H)      Peptic ulcer disease      Postoperative nausea and vomiting        PAST SURGICAL HISTORY:   Past Surgical History:   Procedure Laterality Date     CV CORONARY ANGIOGRAM N/A 02/18/2019    Procedure: Coronary Angiogram;  Surgeon: Sesar Macdonald MD;  Location: [facility] Cath Lab;  Service: Cardiology     CV LEFT HEART CATHETERIZATION WITHOUT LEFT VENTRICULOGRAM Left 02/18/2019    Procedure: Left Heart Catheterization Without Left Ventriculogram;  Surgeon: Sesar Macdonald MD;  Location: [facility] Cath Lab;  Service: Cardiology     SKIN CANCER EXCISION Right     ear     TONSILLECTOMY       TUMOR REMOVAL      Scalp.  Benign.     [facility]C CABG, ARTERIAL, FOUR+ N/A 02/20/2019    Procedure: anesthesia, transesophageal echocardiogram, CORONARY ARTERY BYPASS GRAFT X4, left internal mammary artery, endoscopic vein harvest;  Surgeon: Radha Sandoval MD;  Location: [facility]  Main OR;  Service: Cardiovascular     [facility] CORONARY STENT PERCUT, INITIAL VESSEL  01/01/2003    x3- Cath Stent Placement;  Proc Date: 2003;  Comments: LIZ MID RIGHT POSTEIOR ; ; PROX FIRST DIAGNOL       FAMILY HISTORY:   Family History   Problem Relation Age of Onset     Coronary Artery Disease Father 42     Hyperlipidemia Father      Hyperlipidemia Sister      Heart Disease Paternal Grandfather        SOCIAL HISTORY:   Social History     Socioeconomic History     Marital status: Single   Tobacco Use     Smoking status: Never     Passive exposure: Never     Smokeless tobacco: Never   Vaping Use     Vaping status: Never Used   Substance and Sexual Activity     Alcohol use: No     Drug use: No   Other Topics Concern     Parent/sibling w/ CABG, MI or angioplasty before 65F 55M? Yes     Comment: Father heart attack age 50,      Social Drivers of Health     Financial Resource Strain: Low Risk  (2024)    Financial Resource Strain      Within the past 12 months, have you or your family members you live with been unable to get utilities (heat, electricity) when it was really needed?: No   Food Insecurity: Low Risk  (2024)    Food Insecurity      Within the past 12 months, did you worry that your food would run out before you got money to buy more?: No      Within the past 12 months, did the food you bought just not last and you didn t have money to get more?: No   Transportation Needs: Low Risk  (2024)    Transportation Needs      Within the past 12 months, has lack of transportation kept you from medical appointments, getting your medicines, non-medical meetings or appointments, work, or from getting things that you need?: No   Physical Activity: Sufficiently Active (2024)    Exercise Vital Sign      Days of Exercise per Week: 3 days      Minutes of Exercise per Session: 60 min   Stress: No Stress Concern Present (2024)    [employer] - Occupational Stress  "Questionnaire      Feeling of Stress : Not at all   Social Connections: Unknown (5/2/2024)    Social Connection and Isolation Panel [NHANES]      Frequency of Social Gatherings with Friends and Family: Once a week   Interpersonal Safety: Low Risk  (5/9/2024)    Interpersonal Safety      Do you feel physically and emotionally safe where you currently live?: Yes      Within the past 12 months, have you been hit, slapped, kicked or otherwise physically hurt by someone?: No      Within the past 12 months, have you been humiliated or emotionally abused in other ways by your partner or ex-partner?: No   Housing Stability: Low Risk  (5/2/2024)    Housing Stability      Do you have housing? : Yes      Are you worried about losing your housing?: No       Review Of Systems:  Skin: No rash, pruritis, or skin pigmentation  Eyes: No changes in vision  Ears/Nose/Throat: No changes in hearing, no nosebleeds  Respiratory: No shortness of breath, dyspnea on exertion, cough, or hemoptysis  Cardiovascular: No chest pain or palpitations  Gastrointestinal: No diarrhea or constipation. No abdominal pain. No hematochezia  Genitourinary: see HPI  Musculoskeletal: No pain or swelling of joints, normal range of motion  Neurologic: No weakness or tremors  Psychiatric: No recent changes in memory or mood  Hematologic/Lymphatic/Immunologic: No easy bruising or enlarged lymph nodes  Endocrine: No weight gain or loss      PHYSICAL EXAM:    BP (!) 150/78   Ht 1.689 m (5' 6.5\")   Wt 78.5 kg (173 lb)   BMI 27.50 kg/m      Constitutional: Well developed. Conversant and in no acute distress  Eyes: Anicteric sclera, conjunctiva clear, normal extraocular movements  ENT: Normocephalic and atraumatic,   Skin: Warm and dry. No rashes or lesions  Cardiac: No peripheral edema  Back/Flank: Not done  CNS/PNS: Normal musculature and movements, moves all extremities normally  Respiratory: Normal non-labored breathing  Abdomen: Soft nontender and " "nondistended  Peripheral Vascular: No peripheral edema  Mental Status/Psych: Alert and Oriented x 3. Normal mood and affect    Penis: Not done  Scrotal Skin: Not done  Testicles: Not done  Epididymis: Not done  Digital Rectal Exam:     Cystoscopy: Not done    Imaging: None    Urinalysis: UA RESULTS:  No results for input(s): \"COLOR\", \"APPEARANCE\", \"URINEGLC\", \"URINEBILI\", \"URINEKETONE\", \"SG\", \"UBLD\", \"URINEPH\", \"PROTEIN\", \"UROBILINOGEN\", \"NITRITE\", \"LEUKEST\", \"RBCU\", \"WBCU\" in the last 35737 hours.    PSA: 5.61    Post Void Residual: 9mL    Other labs: None today      IMPRESSION:  Elevated PSA and enlarged prostate    PLAN:  We discussed his MRI results.  In light of the MRI results I recommended a prostate biopsy.  We discussed prostate biopsy in detail today along with its risks, including bleeding and infection.  He wishes to proceed.  Ciprofloxacin was prescribed for prophylaxis.  Will get him scheduled for prostate biopsy.        Anoop Josue M.D.              Again, thank you for allowing me to participate in the care of your patient.      Sincerely,    Anoop Josue MD    "

## 2025-03-10 NOTE — PROGRESS NOTES
Office Visit Note  [facility]  [phone]    UROLOGIC DIAGNOSES:   Elevated PSA  Enlarged prostate    CURRENT INTERVENTIONS:   MRI prostate    HISTORY:   Eleuterio an MRI of the prostate that showed an enlarged prostate measuring 100 g.  There was a single PI-RADS 4 lesion located on the right side of the prostate.  He has nocturia 2 to 3 times nightly but otherwise no urinary symptoms or complaints.      PAST MEDICAL HISTORY:   Past Medical History:   Diagnosis Date    Acute non Q wave MI (myocardial infarction), initial episode of care (H) 2/18/2019    Coronary artery disease 2003    Stent placement x3    Essential hypertension     Hyperlipidemia     Non-ST elevation myocardial infarction (NSTEMI) (H)     Peptic ulcer disease     Postoperative nausea and vomiting        PAST SURGICAL HISTORY:   Past Surgical History:   Procedure Laterality Date    CV CORONARY ANGIOGRAM N/A 02/18/2019    Procedure: Coronary Angiogram;  Surgeon: Sesar Macdonald MD;  Location: [facility] Cath Lab;  Service: Cardiology    CV LEFT HEART CATHETERIZATION WITHOUT LEFT VENTRICULOGRAM Left 02/18/2019    Procedure: Left Heart Catheterization Without Left Ventriculogram;  Surgeon: Sesar Macdonald MD;  Location: [facility] Cath Lab;  Service: Cardiology    SKIN CANCER EXCISION Right     ear    TONSILLECTOMY      TUMOR REMOVAL      Scalp.  Benign.    [facility] CABG, ARTERIAL, FOUR+ N/A 02/20/2019    Procedure: anesthesia, transesophageal echocardiogram, CORONARY ARTERY BYPASS GRAFT X4, left internal mammary artery, endoscopic vein harvest;  Surgeon: Radha Sandoval MD;  Location: [facility] OR;  Service: Cardiovascular    [facility] CORONARY STENT PERCUT, INITIAL VESSEL  01/01/2003    x3- Cath Stent Placement;  Proc Date: 01/01/2003;  Comments: LIZ MID RIGHT POSTEIOR ; ; PROX FIRST DIAGNOL       FAMILY HISTORY:   Family History   Problem Relation Age of Onset    Coronary Artery Disease Father 42    Hyperlipidemia  Father     Hyperlipidemia Sister     Heart Disease Paternal Grandfather        SOCIAL HISTORY:   Social History     Socioeconomic History    Marital status: Single   Tobacco Use    Smoking status: Never     Passive exposure: Never    Smokeless tobacco: Never   Vaping Use    Vaping status: Never Used   Substance and Sexual Activity    Alcohol use: No    Drug use: No   Other Topics Concern    Parent/sibling w/ CABG, MI or angioplasty before 65F 55M? Yes     Comment: Father heart attack age 50,      Social Drivers of Health     Financial Resource Strain: Low Risk  (2024)    Financial Resource Strain     Within the past 12 months, have you or your family members you live with been unable to get utilities (heat, electricity) when it was really needed?: No   Food Insecurity: Low Risk  (2024)    Food Insecurity     Within the past 12 months, did you worry that your food would run out before you got money to buy more?: No     Within the past 12 months, did the food you bought just not last and you didn t have money to get more?: No   Transportation Needs: Low Risk  (2024)    Transportation Needs     Within the past 12 months, has lack of transportation kept you from medical appointments, getting your medicines, non-medical meetings or appointments, work, or from getting things that you need?: No   Physical Activity: Sufficiently Active (2024)    Exercise Vital Sign     Days of Exercise per Week: 3 days     Minutes of Exercise per Session: 60 min   Stress: No Stress Concern Present (2024)    [employer] - Occupational Stress Questionnaire     Feeling of Stress : Not at all   Social Connections: Unknown (2024)    Social Connection and Isolation Panel [NHANES]     Frequency of Social Gatherings with Friends and Family: Once a week   Interpersonal Safety: Low Risk  (2024)    Interpersonal Safety     Do you feel physically and emotionally safe where you currently  "live?: Yes     Within the past 12 months, have you been hit, slapped, kicked or otherwise physically hurt by someone?: No     Within the past 12 months, have you been humiliated or emotionally abused in other ways by your partner or ex-partner?: No   Housing Stability: Low Risk  (5/2/2024)    Housing Stability     Do you have housing? : Yes     Are you worried about losing your housing?: No       Review Of Systems:  Skin: No rash, pruritis, or skin pigmentation  Eyes: No changes in vision  Ears/Nose/Throat: No changes in hearing, no nosebleeds  Respiratory: No shortness of breath, dyspnea on exertion, cough, or hemoptysis  Cardiovascular: No chest pain or palpitations  Gastrointestinal: No diarrhea or constipation. No abdominal pain. No hematochezia  Genitourinary: see HPI  Musculoskeletal: No pain or swelling of joints, normal range of motion  Neurologic: No weakness or tremors  Psychiatric: No recent changes in memory or mood  Hematologic/Lymphatic/Immunologic: No easy bruising or enlarged lymph nodes  Endocrine: No weight gain or loss      PHYSICAL EXAM:    BP (!) 150/78   Ht 1.689 m (5' 6.5\")   Wt 78.5 kg (173 lb)   BMI 27.50 kg/m      Constitutional: Well developed. Conversant and in no acute distress  Eyes: Anicteric sclera, conjunctiva clear, normal extraocular movements  ENT: Normocephalic and atraumatic,   Skin: Warm and dry. No rashes or lesions  Cardiac: No peripheral edema  Back/Flank: Not done  CNS/PNS: Normal musculature and movements, moves all extremities normally  Respiratory: Normal non-labored breathing  Abdomen: Soft nontender and nondistended  Peripheral Vascular: No peripheral edema  Mental Status/Psych: Alert and Oriented x 3. Normal mood and affect    Penis: Not done  Scrotal Skin: Not done  Testicles: Not done  Epididymis: Not done  Digital Rectal Exam:     Cystoscopy: Not done    Imaging: None    Urinalysis: UA RESULTS:  No results for input(s): \"COLOR\", \"APPEARANCE\", \"URINEGLC\", " "\"URINEBILI\", \"URINEKETONE\", \"SG\", \"UBLD\", \"URINEPH\", \"PROTEIN\", \"UROBILINOGEN\", \"NITRITE\", \"LEUKEST\", \"RBCU\", \"WBCU\" in the last 73990 hours.    PSA: 5.61    Post Void Residual: 9mL    Other labs: None today      IMPRESSION:  Elevated PSA and enlarged prostate    PLAN:  We discussed his MRI results.  In light of the MRI results I recommended a prostate biopsy.  We discussed prostate biopsy in detail today along with its risks, including bleeding and infection.  He wishes to proceed.  Ciprofloxacin was prescribed for prophylaxis.  Will get him scheduled for prostate biopsy.        Anoop Josue M.D.            "

## 2025-04-15 ENCOUNTER — PATIENT OUTREACH (OUTPATIENT)
Dept: CARE COORDINATION | Facility: CLINIC | Age: 70
End: 2025-04-15

## 2025-04-15 ENCOUNTER — OFFICE VISIT (OUTPATIENT)
Dept: UROLOGY | Facility: CLINIC | Age: 70
End: 2025-04-15
Payer: COMMERCIAL

## 2025-04-15 VITALS
DIASTOLIC BLOOD PRESSURE: 81 MMHG | HEIGHT: 66 IN | HEART RATE: 69 BPM | OXYGEN SATURATION: 98 % | WEIGHT: 173 LBS | SYSTOLIC BLOOD PRESSURE: 132 MMHG | BODY MASS INDEX: 27.8 KG/M2

## 2025-04-15 DIAGNOSIS — Z79.2 PROPHYLACTIC ANTIBIOTIC: Primary | ICD-10-CM

## 2025-04-15 DIAGNOSIS — R97.20 ELEVATED PROSTATE SPECIFIC ANTIGEN (PSA): ICD-10-CM

## 2025-04-15 PROCEDURE — 76942 ECHO GUIDE FOR BIOPSY: CPT | Performed by: UROLOGY

## 2025-04-15 PROCEDURE — 3079F DIAST BP 80-89 MM HG: CPT | Performed by: UROLOGY

## 2025-04-15 PROCEDURE — G0416 PROSTATE BIOPSY, ANY MTHD: HCPCS | Performed by: PATHOLOGY

## 2025-04-15 PROCEDURE — 1126F AMNT PAIN NOTED NONE PRSNT: CPT | Performed by: UROLOGY

## 2025-04-15 PROCEDURE — 3074F SYST BP LT 130 MM HG: CPT | Performed by: UROLOGY

## 2025-04-15 PROCEDURE — 96372 THER/PROPH/DIAG INJ SC/IM: CPT | Mod: 59 | Performed by: UROLOGY

## 2025-04-15 PROCEDURE — 55700 PR BIOPSY OF PROSTATE,NEEDLE/PUNCH: CPT | Performed by: UROLOGY

## 2025-04-15 RX ORDER — GENTAMICIN 40 MG/ML
80 INJECTION, SOLUTION INTRAMUSCULAR; INTRAVENOUS ONCE
Status: COMPLETED | OUTPATIENT
Start: 2025-04-15 | End: 2025-04-15

## 2025-04-15 RX ORDER — LIDOCAINE HYDROCHLORIDE 10 MG/ML
20 INJECTION, SOLUTION EPIDURAL; INFILTRATION; INTRACAUDAL; PERINEURAL ONCE
Status: COMPLETED | OUTPATIENT
Start: 2025-04-15 | End: 2025-04-15

## 2025-04-15 RX ADMIN — LIDOCAINE HYDROCHLORIDE 20 ML: 10 INJECTION, SOLUTION EPIDURAL; INFILTRATION; INTRACAUDAL; PERINEURAL at 08:54

## 2025-04-15 RX ADMIN — GENTAMICIN 80 MG: 40 INJECTION, SOLUTION INTRAMUSCULAR; INTRAVENOUS at 08:00

## 2025-04-15 ASSESSMENT — PAIN SCALES - GENERAL: PAINLEVEL_OUTOF10: NO PAIN (0)

## 2025-04-15 NOTE — LETTER
4/15/2025       RE: Eleuterio Camejo  [address] [facility] [place] [address]     Dear Colleague,    Thank you for referring your patient, Eleuterio Camejo, to the [facility] [place] at [facility]. Please see a copy of my visit note below.    Here for an MRI-ultrasound-fusion guided biopsy of the prostate    We previously obtained an MRI of the prostate and identified all PIRADS 4-5 lesions for targeting    Target Lesion #1 Right sided lesion    Prostate volume on MRI 100g    Prostate Specific Antigen Screen   Date Value Ref Range Status   05/09/2024 4.78 (H) 0.00 - 4.50 ng/mL Final   02/28/2023 4.30 0.00 - 4.50 ng/mL Final     PSA Tumor Marker   Date Value Ref Range Status   01/20/2025 5.61 (H) 0.00 - 4.50 ng/mL Final       An enema was completed and 500 mg of Cipro was given prior to the biopsy.  After obtaining informed consent patient was placed in lateral decubitus position.  The ultrasound probe was placed in the rectum.  The prostate was numbed using ultrasound guidance with 1% lidocaine 5 mls along each nerve bundle.  US images were obtained and then fused with the MRI images.  The fused images were then used to guide the biopsy of the targeted lesions with at least 2 cores taken of each lesion.  We then performed random biopsies.  12 cores were taken with 6 on each side, base, mid and apex.  The patient tolerated the procedure well.      We will follow up with the results in 7-10 days and contact patient with these results.       Again, thank you for allowing me to participate in the care of your patient.      Sincerely,    Anoop Josue MD

## 2025-04-15 NOTE — NURSING NOTE
Chief Complaint   Patient presents with    Elevated PSA     Here for transrectal ultrasound MRI guided prostate biopsy       Procedure was explained to patient prior to performing said procedure. The patient signed the consent form and all questions were answered prior to the procedure. Any pre-procedural antibiotics were given according to the performing physicians recommendation. Pt's information was confirmed on samples and samples were sent for analysis. [facility] reviewed information on labels sent with patient and confirmed the accuracy of all the labels.    Consent read and signed: Yes     Allergies   Allergen Reactions    Amoxicillin Anaphylaxis and Swelling    Penicillins Anaphylaxis     Performing Physician: Dr. Josue  Antibiotic taken?  Yes  Patient given Gentamicin intramuscular injection 30 minutes prior to procedure Yes    12 samples were taken from the right and left, medial and lateral base, mid, and apex of the prostate respectively. Yes additional samples were taken . Vitals were repeated prior to patient leaving and instructions for post  transrectal ultrasound MRI guided prostate biopsy care were explained to the patient.      The following medication was given:     MEDICATION: Gentamicin   ROUTE: IM  SITE: LUQ - Gluteus  DOSE:80mg/2ml  LOT #: [id number]  : [employer]  EXPIRATION DATE:   NDC#:  534797-252-73  Was there drug waste? No  Multi-dose vial: Yes    Clinic Administered Medication Documentation    Patient was given 80mg/2ml Gentamicin. Prior to medication administration, verified patient's identity using patient s name and date of birth. Please see MAR and medication order for additional information. Patient instructed to remain in clinic for 15 minutes and report any adverse reaction to staff immediately.    Vial/Syringe: Single dose vial. Was entire vial of medication used? Yes     Patient tolerated injection well. Stayed in clinic for up to 15 minutes, with no adverse  reaction.       The following medication was given by MD:     MEDICATION:  Lidocaine 1% Soln  ROUTE: Local Infiltration   SITE: Prostate  DOSE: 15 mg  LOT #: [id number]  : [employer]  EXPIRATION DATE:   NDC#: 7755-2447-35  Was there drug waste? Yes  Amount of drug waste (mL): 5.  Reason for waste:  Single use vial or As per MD  Multi-dose vial: Yes    Clinic Administered Medication Documentation    Patient was given 1% lidocaine. Prior to medication administration, verified patient's identity using patient s name and date of birth. Please see MAR and medication order for additional information. Patient instructed to remain in clinic for 15 minutes and report any adverse reaction to staff immediately.    Vial/Syringe: Single dose vial. Was entire vial of medication used? No, The remainder 5ML of 20ML was discarded as unavoidable waste.         ANDRES Sullivan

## 2025-04-15 NOTE — NURSING NOTE
Chief Complaint   Patient presents with    Elevated PSA     Here for transrectal ultrasound MRI guided prostate biopsy       Procedure was explained to patient prior to performing said procedure. The patient signed the consent form and all questions were answered prior to the procedure. Any pre-procedural antibiotics were given according to the performing physicians recommendation. Pt's information was confirmed on samples and samples were sent for analysis. [facility] reviewed information on labels sent with patient and confirmed the accuracy of all the labels.    Consent read and signed: Yes     Allergies   Allergen Reactions    Amoxicillin Anaphylaxis and Swelling    Penicillins Anaphylaxis     Performing Physician: Dr. Josue  Antibiotic taken?  yes  Aspirin or other blood thinning medications discontinued 7-10 days:  yes  Time of Fleet's enema:  6 am    12 samples were taken from the right and left, medial and lateral base, mid, and apex of the prostate respectively.  additional samples were taken from . Vitals were repeated prior to patient leaving and instructions for post TRUS care were explained to the pt.

## 2025-04-15 NOTE — PROGRESS NOTES
Here for an MRI-ultrasound-fusion guided biopsy of the prostate    We previously obtained an MRI of the prostate and identified all PIRADS 4-5 lesions for targeting    Target Lesion #1 Right sided lesion    Prostate volume on MRI 100g    Prostate Specific Antigen Screen   Date Value Ref Range Status   05/09/2024 4.78 (H) 0.00 - 4.50 ng/mL Final   02/28/2023 4.30 0.00 - 4.50 ng/mL Final     PSA Tumor Marker   Date Value Ref Range Status   01/20/2025 5.61 (H) 0.00 - 4.50 ng/mL Final       An enema was completed and 500 mg of Cipro was given prior to the biopsy.  After obtaining informed consent patient was placed in lateral decubitus position.  The ultrasound probe was placed in the rectum.  The prostate was numbed using ultrasound guidance with 1% lidocaine 5 mls along each nerve bundle.  US images were obtained and then fused with the MRI images.  The fused images were then used to guide the biopsy of the targeted lesions with at least 2 cores taken of each lesion.  We then performed random biopsies.  12 cores were taken with 6 on each side, base, mid and apex.  The patient tolerated the procedure well.      We will follow up with the results in 7-10 days and contact patient with these results.

## 2025-04-16 LAB
PATH REPORT.COMMENTS IMP SPEC: ABNORMAL
PATH REPORT.COMMENTS IMP SPEC: ABNORMAL
PATH REPORT.COMMENTS IMP SPEC: YES
PATH REPORT.FINAL DX SPEC: ABNORMAL
PATH REPORT.GROSS SPEC: ABNORMAL
PATH REPORT.MICROSCOPIC SPEC OTHER STN: ABNORMAL
PATH REPORT.RELEVANT HX SPEC: ABNORMAL
PHOTO IMAGE: ABNORMAL

## 2025-04-21 DIAGNOSIS — E78.5 HYPERLIPIDEMIA: ICD-10-CM

## 2025-04-21 RX ORDER — LISINOPRIL 10 MG/1
10 TABLET ORAL DAILY
Qty: 90 TABLET | Refills: 2 | Status: SHIPPED | OUTPATIENT
Start: 2025-04-21

## 2025-04-21 RX ORDER — ROSUVASTATIN CALCIUM 40 MG/1
40 TABLET, COATED ORAL DAILY
Qty: 90 TABLET | Refills: 2 | Status: SHIPPED | OUTPATIENT
Start: 2025-04-21

## 2025-04-22 ENCOUNTER — OFFICE VISIT (OUTPATIENT)
Dept: UROLOGY | Facility: CLINIC | Age: 70
End: 2025-04-22
Payer: COMMERCIAL

## 2025-04-22 VITALS
HEIGHT: 66 IN | DIASTOLIC BLOOD PRESSURE: 80 MMHG | OXYGEN SATURATION: 98 % | HEART RATE: 64 BPM | WEIGHT: 173 LBS | BODY MASS INDEX: 27.8 KG/M2 | SYSTOLIC BLOOD PRESSURE: 138 MMHG

## 2025-04-22 DIAGNOSIS — C61 PROSTATE CANCER (H): ICD-10-CM

## 2025-04-22 DIAGNOSIS — R97.20 ELEVATED PROSTATE SPECIFIC ANTIGEN (PSA): Primary | ICD-10-CM

## 2025-04-22 RX ORDER — ACETAMINOPHEN 325 MG/1
975 TABLET ORAL ONCE
OUTPATIENT
Start: 2025-04-22 | End: 2025-04-22

## 2025-04-22 ASSESSMENT — PAIN SCALES - GENERAL: PAINLEVEL_OUTOF10: NO PAIN (0)

## 2025-04-22 NOTE — NURSING NOTE
Chief Complaint   Patient presents with    Elevated PSA     Patient is here to go over Prostate Biopsy results      Tish Moran LPN

## 2025-04-22 NOTE — LETTER
4/22/2025       RE: Eleuterio Camejo  [address]  [place] [facility] [address]     Dear Colleague,    Thank you for referring your patient, Eleuterio Camejo, to the [facility] [place] at [facility]. Please see a copy of my visit note below.    Office Visit Note  [facility]  [phone]    UROLOGIC DIAGNOSES:   T1C Yoel 4+3 equal 7 prostate cancer  Enlarged prostate    CURRENT INTERVENTIONS:       HISTORY:   Eleuterio returns for discussion of his recent prostate biopsy results.  He has felt well since the biopsy and has no urinary complaints from his enlarged prostate.  The targeted lesion taken from the right side of the prostate showed Thicket 4+3 = 7 prostate cancer.  In addition, a template cores taken from this area showed Yoel 3+4 = 7 prostate cancer.  All other biopsies were negative.  He has had a prior coronary artery bypass grafting surgery but has been stable for years from a cardiac standpoint.  He is otherwise in good health and able to be active.  Has had no prior abdominal surgeries.  He has been discussing treatment options and experiences with friends and had prostate cancer as well.      PAST MEDICAL HISTORY:   Past Medical History:   Diagnosis Date     Acute non Q wave MI (myocardial infarction), initial episode of care (H) 2/18/2019     Coronary artery disease 2003    Stent placement x3     Essential hypertension      Hyperlipidemia      Non-ST elevation myocardial infarction (NSTEMI) (H)      Peptic ulcer disease      Postoperative nausea and vomiting        PAST SURGICAL HISTORY:   Past Surgical History:   Procedure Laterality Date     CV CORONARY ANGIOGRAM N/A 02/18/2019    Procedure: Coronary Angiogram;  Surgeon: Sesar Macdonald MD;  Location: [facility] Cath Lab;  Service: Cardiology     CV LEFT HEART CATHETERIZATION WITHOUT LEFT VENTRICULOGRAM Left 02/18/2019    Procedure: Left Heart  Catheterization Without Left Ventriculogram;  Surgeon: Sesar Macdonald MD;  Location: [facility] Cath Lab;  Service: Cardiology     MRI BIOPSY PROSTATE Bilateral 04/15/2025     SKIN CANCER EXCISION Right     ear     TONSILLECTOMY       TUMOR REMOVAL      Scalp.  Benign.     [facility] CABG, ARTERIAL, FOUR+ N/A 2019    Procedure: anesthesia, transesophageal echocardiogram, CORONARY ARTERY BYPASS GRAFT X4, left internal mammary artery, endoscopic vein harvest;  Surgeon: Radha Sandoval MD;  Location: [facility] Main OR;  Service: Cardiovascular     [facility] CORONARY STENT PERCUT, INITIAL VESSEL  01/01/2003    x3- Cath Stent Placement;  Proc Date: 2003;  Comments: LIZ MID RIGHT POSTEIOR ; ; PROX FIRST DIAGNOL       FAMILY HISTORY:   Family History   Problem Relation Age of Onset     Coronary Artery Disease Father 42     Hyperlipidemia Father      Hyperlipidemia Sister      Heart Disease Paternal Grandfather        SOCIAL HISTORY:   Social History     Socioeconomic History     Marital status: Single   Tobacco Use     Smoking status: Never     Passive exposure: Never     Smokeless tobacco: Never   Vaping Use     Vaping status: Never Used   Substance and Sexual Activity     Alcohol use: No     Drug use: No   Other Topics Concern     Parent/sibling w/ CABG, MI or angioplasty before 65F 55M? Yes     Comment: Father heart attack age 50,      Social Drivers of Health     Financial Resource Strain: Low Risk  (2024)    Financial Resource Strain      Within the past 12 months, have you or your family members you live with been unable to get utilities (heat, electricity) when it was really needed?: No   Food Insecurity: Low Risk  (2024)    Food Insecurity      Within the past 12 months, did you worry that your food would run out before you got money to buy more?: No      Within the past 12 months, did the food you bought just not last and you didn t have money to get more?: No   Transportation Needs:  Low Risk  (5/2/2024)    Transportation Needs      Within the past 12 months, has lack of transportation kept you from medical appointments, getting your medicines, non-medical meetings or appointments, work, or from getting things that you need?: No   Physical Activity: Sufficiently Active (5/2/2024)    Exercise Vital Sign      Days of Exercise per Week: 3 days      Minutes of Exercise per Session: 60 min   Stress: No Stress Concern Present (5/2/2024)    [employer] [place] - Occupational Stress Questionnaire      Feeling of Stress : Not at all   Social Connections: Unknown (5/2/2024)    Social Connection and Isolation Panel [NHANES]      Frequency of Social Gatherings with Friends and Family: Once a week   Interpersonal Safety: Low Risk  (5/9/2024)    Interpersonal Safety      Do you feel physically and emotionally safe where you currently live?: Yes      Within the past 12 months, have you been hit, slapped, kicked or otherwise physically hurt by someone?: No      Within the past 12 months, have you been humiliated or emotionally abused in other ways by your partner or ex-partner?: No   Housing Stability: Low Risk  (5/2/2024)    Housing Stability      Do you have housing? : Yes      Are you worried about losing your housing?: No       Review Of Systems:  Skin: No rash, pruritis, or skin pigmentation  Eyes: No changes in vision  Ears/Nose/Throat: No changes in hearing, no nosebleeds  Respiratory: No shortness of breath, dyspnea on exertion, cough, or hemoptysis  Cardiovascular: No chest pain or palpitations  Gastrointestinal: No diarrhea or constipation. No abdominal pain. No hematochezia  Genitourinary: see HPI  Musculoskeletal: No pain or swelling of joints, normal range of motion  Neurologic: No weakness or tremors  Psychiatric: No recent changes in memory or mood  Hematologic/Lymphatic/Immunologic: No easy bruising or enlarged lymph nodes  Endocrine: No weight gain or loss      PHYSICAL  "EXAM:    /80   Pulse 64   Ht 1.676 m (5' 6\")   Wt 78.5 kg (173 lb)   SpO2 98%   BMI 27.92 kg/m      Constitutional: Well developed. Conversant and in no acute distress  Eyes: Anicteric sclera, conjunctiva clear, normal extraocular movements  ENT: Normocephalic and atraumatic,   Skin: Warm and dry. No rashes or lesions  Cardiac: No peripheral edema  Back/Flank: Not done  CNS/PNS: Normal musculature and movements, moves all extremities normally  Respiratory: Normal non-labored breathing  Abdomen: Soft nontender and nondistended  Peripheral Vascular: No peripheral edema  Mental Status/Psych: Alert and Oriented x 3. Normal mood and affect    Penis: Not done  Scrotal Skin: Not done  Testicles: Not done  Epididymis: Not done  Digital Rectal Exam:     Cystoscopy: Not done    Imaging: None    Urinalysis: UA RESULTS:  Recent Labs   Lab Test 03/10/25  1100   COLOR Yellow   APPEARANCE Clear   URINEGLC Negative   URINEBILI Negative   URINEKETONE Negative   SG >=1.030   UBLD Negative   URINEPH 5.5   PROTEIN Negative   UROBILINOGEN 0.2   NITRITE Negative   LEUKEST Negative       PSA: 5.61    Post Void Residual:     Other labs: None today      IMPRESSION:  Unfavorable intermediate risk prostate cancer  Enlarged prostate    PLAN:  He will be diagnosed with an unfavorable intermediate risk prostate cancer.  We discussed the natural history of prostate cancer along with treatment options.    We discussed active surveillance as a treatment option, but I would not recommend active surveillance given his intermediate risk prostate cancer and his good health.    We discussed robotic assisted laparoscopic radical prostatectomy with bilateral pelvic lymph node dissection as a treatment option.  We discussed the risks of the procedure along with the expected recovery.    We discussed radiotherapy combined with hormonal risks of radiotherapy and potential side effects from hormonal therapy    We also briefly touched upon focal " therapy treatment options    He has been discussing options with acquaintances of his as well and he would like to proceed with surgery.  We discussed robotic assisted Radical prostatectomy with bilateral pelvic) dissection.  We discussed the risks procedure.  All of his questions were answered.  He understands that he may require further therapy even after surgery.  Also, he reports poor erectile function so we will not plan on performing a nerve sparing procedure.  We will get him scheduled in the operating room.        Anoop Josue M.D.              Again, thank you for allowing me to participate in the care of your patient.      Sincerely,    Anoop Josue MD

## 2025-04-22 NOTE — PROGRESS NOTES
Office Visit Note  [facility]  [phone]    UROLOGIC DIAGNOSES:   T1C [place] 4+3 equal 7 prostate cancer  Enlarged prostate    CURRENT INTERVENTIONS:       HISTORY:   Eleuterio returns for discussion of his recent prostate biopsy results.  He has felt well since the biopsy and has no urinary complaints from his enlarged prostate.  The targeted lesion taken from the right side of the prostate showed [place] 4+3 = 7 prostate cancer.  In addition, a template cores taken from this area showed [place] 3+4 = 7 prostate cancer.  All other biopsies were negative.  He has had a prior coronary artery bypass grafting surgery but has been stable for years from a cardiac standpoint.  He is otherwise in good health and able to be active.  Has had no prior abdominal surgeries.  He has been discussing treatment options and experiences with friends and had prostate cancer as well.      PAST MEDICAL HISTORY:   Past Medical History:   Diagnosis Date    Acute non Q wave MI (myocardial infarction), initial episode of care (H) 2/18/2019    Coronary artery disease 2003    Stent placement x3    Essential hypertension     Hyperlipidemia     Non-ST elevation myocardial infarction (NSTEMI) (H)     Peptic ulcer disease     Postoperative nausea and vomiting        PAST SURGICAL HISTORY:   Past Surgical History:   Procedure Laterality Date    CV CORONARY ANGIOGRAM N/A 02/18/2019    Procedure: Coronary Angiogram;  Surgeon: Sesar Macdonald MD;  Location: [facility];  Service: Cardiology    CV LEFT HEART CATHETERIZATION WITHOUT LEFT VENTRICULOGRAM Left 02/18/2019    Procedure: Left Heart Catheterization Without Left Ventriculogram;  Surgeon: Sesar Macdonald MD;  Location: [facility];  Service: Cardiology    MRI BIOPSY PROSTATE Bilateral 04/15/2025    SKIN CANCER EXCISION Right     ear    TONSILLECTOMY      TUMOR REMOVAL      Scalp.  Benign.    ZC CABG, ARTERIAL, FOUR+ N/A 02/20/2019     Procedure: anesthesia, transesophageal echocardiogram, CORONARY ARTERY BYPASS GRAFT X4, left internal mammary artery, endoscopic vein harvest;  Surgeon: Radha Sandoval MD;  Location: [facility];  Service: Cardiovascular    [email or web address] CORONARY STENT PERCUT, INITIAL VESSEL  01/01/2003    x3- Cath Stent Placement;  Proc Date: 2003;  Comments: LIZ MID RIGHT POSTEIOR ; ; PROX FIRST DIAGNOL       FAMILY HISTORY:   Family History   Problem Relation Age of Onset    Coronary Artery Disease Father 42    Hyperlipidemia Father     Hyperlipidemia Sister     Heart Disease Paternal Grandfather        SOCIAL HISTORY:   Social History     Socioeconomic History    Marital status: Single   Tobacco Use    Smoking status: Never     Passive exposure: Never    Smokeless tobacco: Never   Vaping Use    Vaping status: Never Used   Substance and Sexual Activity    Alcohol use: No    Drug use: No   Other Topics Concern    Parent/sibling w/ CABG, MI or angioplasty before 65F 55M? Yes     Comment: Father heart attack age 50,      Social Drivers of Health     Financial Resource Strain: Low Risk  (2024)    Financial Resource Strain     Within the past 12 months, have you or your family members you live with been unable to get utilities (heat, electricity) when it was really needed?: No   Food Insecurity: Low Risk  (2024)    Food Insecurity     Within the past 12 months, did you worry that your food would run out before you got money to buy more?: No     Within the past 12 months, did the food you bought just not last and you didn t have money to get more?: No   Transportation Needs: Low Risk  (2024)    Transportation Needs     Within the past 12 months, has lack of transportation kept you from medical appointments, getting your medicines, non-medical meetings or appointments, work, or from getting things that you need?: No   Physical Activity: Sufficiently Active (2024)    Exercise Vital Sign     Days of  "Exercise per Week: 3 days     Minutes of Exercise per Session: 60 min   Stress: No Stress Concern Present (5/2/2024)    [employer] - Occupational Stress Questionnaire     Feeling of Stress : Not at all   Social Connections: Unknown (5/2/2024)    Social Connection and Isolation Panel [NHANES]     Frequency of Social Gatherings with Friends and Family: Once a week   Interpersonal Safety: Low Risk  (5/9/2024)    Interpersonal Safety     Do you feel physically and emotionally safe where you currently live?: Yes     Within the past 12 months, have you been hit, slapped, kicked or otherwise physically hurt by someone?: No     Within the past 12 months, have you been humiliated or emotionally abused in other ways by your partner or ex-partner?: No   Housing Stability: Low Risk  (5/2/2024)    Housing Stability     Do you have housing? : Yes     Are you worried about losing your housing?: No       Review Of Systems:  Skin: No rash, pruritis, or skin pigmentation  Eyes: No changes in vision  Ears/Nose/Throat: No changes in hearing, no nosebleeds  Respiratory: No shortness of breath, dyspnea on exertion, cough, or hemoptysis  Cardiovascular: No chest pain or palpitations  Gastrointestinal: No diarrhea or constipation. No abdominal pain. No hematochezia  Genitourinary: see HPI  Musculoskeletal: No pain or swelling of joints, normal range of motion  Neurologic: No weakness or tremors  Psychiatric: No recent changes in memory or mood  Hematologic/Lymphatic/Immunologic: No easy bruising or enlarged lymph nodes  Endocrine: No weight gain or loss      PHYSICAL EXAM:    /80   Pulse 64   Ht 1.676 m (5' 6\")   Wt 78.5 kg (173 lb)   SpO2 98%   BMI 27.92 kg/m      Constitutional: Well developed. Conversant and in no acute distress  Eyes: Anicteric sclera, conjunctiva clear, normal extraocular movements  ENT: Normocephalic and atraumatic,   Skin: Warm and dry. No rashes or lesions  Cardiac: No " peripheral edema  Back/Flank: Not done  CNS/PNS: Normal musculature and movements, moves all extremities normally  Respiratory: Normal non-labored breathing  Abdomen: Soft nontender and nondistended  Peripheral Vascular: No peripheral edema  Mental Status/Psych: Alert and Oriented x 3. Normal mood and affect    Penis: Not done  Scrotal Skin: Not done  Testicles: Not done  Epididymis: Not done  Digital Rectal Exam:     Cystoscopy: Not done    Imaging: None    Urinalysis: UA RESULTS:  Recent Labs   Lab Test 03/10/25  1100   COLOR Yellow   APPEARANCE Clear   URINEGLC Negative   URINEBILI Negative   URINEKETONE Negative   SG >=1.030   UBLD Negative   URINEPH 5.5   PROTEIN Negative   UROBILINOGEN 0.2   NITRITE Negative   LEUKEST Negative       PSA: 5.61    Post Void Residual:     Other labs: None today      IMPRESSION:  Unfavorable intermediate risk prostate cancer  Enlarged prostate    PLAN:  He will be diagnosed with an unfavorable intermediate risk prostate cancer.  We discussed the natural history of prostate cancer along with treatment options.    We discussed active surveillance as a treatment option, but I would not recommend active surveillance given his intermediate risk prostate cancer and his good health.    We discussed robotic assisted laparoscopic radical prostatectomy with bilateral pelvic lymph node dissection as a treatment option.  We discussed the risks of the procedure along with the expected recovery.    We discussed radiotherapy combined with hormonal risks of radiotherapy and potential side effects from hormonal therapy    We also briefly touched upon focal therapy treatment options    He has been discussing options with acquaintances of his as well and he would like to proceed with surgery.  We discussed robotic assisted Radical prostatectomy with bilateral pelvic) dissection.  We discussed the risks procedure.  All of his questions were answered.  He understands that he may require further  therapy even after surgery.  Also, he reports poor erectile function so we will not plan on performing a nerve sparing procedure.  We will get him scheduled in the operating room.        Anoop Josue M.D.

## 2025-05-13 ENCOUNTER — PATIENT OUTREACH (OUTPATIENT)
Dept: FAMILY MEDICINE | Facility: CLINIC | Age: 70
End: 2025-05-13
Payer: COMMERCIAL

## 2025-05-13 NOTE — TELEPHONE ENCOUNTER
Patient Quality Outreach    Patient is due for the following:   Physical Annual Wellness Visit    Action(s) Taken:   Schedule a Annual Wellness Visit    Type of outreach:    Sent [employer] message.    Questions for provider review:    None         Caroline Sanchez MA  Chart routed to None.

## 2025-05-28 ENCOUNTER — PATIENT OUTREACH (OUTPATIENT)
Dept: CARE COORDINATION | Facility: CLINIC | Age: 70
End: 2025-05-28
Payer: COMMERCIAL

## 2025-05-28 NOTE — PROGRESS NOTES
Clinical Product Navigator RN reviewed chart; patient on payer product coverage.  Review results:   CPN Initial Information Gathering  Referral Source: Health Plan  Referrals Places: Care Coordination    Met referral criteria for Care Coordinator; referral to be sent.    Patient identified by their health plan for care coordination outreach for pre-operative care management assessment prior to 7/8/25 prostatectomy, robot-assisted with pelvic lymphadenectomy.    Melissa Behl BSN, RN, PHN, CCM  RN Clinical Product Navigator  [phone]

## 2025-05-29 ENCOUNTER — PATIENT OUTREACH (OUTPATIENT)
Dept: CARE COORDINATION | Facility: CLINIC | Age: 70
End: 2025-05-29
Payer: COMMERCIAL

## 2025-05-29 NOTE — PROGRESS NOTES
Clinic Care Coordination Contact  [facility]    Clinical Data: Care Coordinator Outreach    Outreach Documentation Number of Outreach Attempt   5/29/2025   1:27 PM 1       Unable to leave a message due to: no option, just rang      Plan: Care Coordinator CHW to discuss recent CC/CPN referral with patient and offer CC services & resources.  Care Coordinator will try to reach patient again in 1-2 business days.      Saritha GALLEGO  Community Health Worker  [facility] Care Coordination  [email or web address] [email or web address]  Office: [phone]

## 2025-06-10 ENCOUNTER — OFFICE VISIT (OUTPATIENT)
Dept: FAMILY MEDICINE | Facility: CLINIC | Age: 70
End: 2025-06-10
Attending: NURSE PRACTITIONER
Payer: COMMERCIAL

## 2025-06-10 VITALS
BODY MASS INDEX: 27.61 KG/M2 | SYSTOLIC BLOOD PRESSURE: 165 MMHG | HEIGHT: 67 IN | OXYGEN SATURATION: 97 % | DIASTOLIC BLOOD PRESSURE: 85 MMHG | WEIGHT: 175.93 LBS | TEMPERATURE: 98.1 F | HEART RATE: 73 BPM | RESPIRATION RATE: 16 BRPM

## 2025-06-10 DIAGNOSIS — I25.10 CORONARY ARTERY DISEASE INVOLVING NATIVE CORONARY ARTERY OF NATIVE HEART WITHOUT ANGINA PECTORIS: ICD-10-CM

## 2025-06-10 DIAGNOSIS — Z95.1 S/P CABG (CORONARY ARTERY BYPASS GRAFT): ICD-10-CM

## 2025-06-10 DIAGNOSIS — Z01.818 PRE-OPERATIVE GENERAL PHYSICAL EXAMINATION: Primary | ICD-10-CM

## 2025-06-10 DIAGNOSIS — Z12.11 SCREEN FOR COLON CANCER: ICD-10-CM

## 2025-06-10 DIAGNOSIS — I10 ESSENTIAL HYPERTENSION: ICD-10-CM

## 2025-06-10 DIAGNOSIS — R73.03 PREDIABETES: ICD-10-CM

## 2025-06-10 DIAGNOSIS — E78.01 FAMILIAL HYPERCHOLESTEROLEMIA: ICD-10-CM

## 2025-06-10 DIAGNOSIS — C61 PROSTATE CANCER (H): ICD-10-CM

## 2025-06-10 DIAGNOSIS — I25.2 HISTORY OF NON-ST ELEVATION MYOCARDIAL INFARCTION (NSTEMI): ICD-10-CM

## 2025-06-10 PROBLEM — R97.20 ELEVATED PROSTATE SPECIFIC ANTIGEN (PSA): Status: RESOLVED | Noted: 2024-05-10 | Resolved: 2025-06-10

## 2025-06-10 LAB
ALBUMIN SERPL BCG-MCNC: 4.3 G/DL (ref 3.5–5.2)
ALP SERPL-CCNC: 70 U/L (ref 40–150)
ALT SERPL W P-5'-P-CCNC: 45 U/L (ref 0–70)
ANION GAP SERPL CALCULATED.3IONS-SCNC: 12 MMOL/L (ref 7–15)
AST SERPL W P-5'-P-CCNC: 40 U/L (ref 0–45)
BILIRUB SERPL-MCNC: 0.3 MG/DL
BUN SERPL-MCNC: 18.6 MG/DL (ref 8–23)
CALCIUM SERPL-MCNC: 9.4 MG/DL (ref 8.8–10.4)
CHLORIDE SERPL-SCNC: 108 MMOL/L (ref 98–107)
CHOLEST SERPL-MCNC: 99 MG/DL
CREAT SERPL-MCNC: 0.99 MG/DL (ref 0.67–1.17)
EGFRCR SERPLBLD CKD-EPI 2021: 83 ML/MIN/1.73M2
ERYTHROCYTE [DISTWIDTH] IN BLOOD BY AUTOMATED COUNT: 12.9 % (ref 10–15)
EST. AVERAGE GLUCOSE BLD GHB EST-MCNC: 120 MG/DL
FASTING STATUS PATIENT QL REPORTED: YES
FASTING STATUS PATIENT QL REPORTED: YES
GLUCOSE SERPL-MCNC: 125 MG/DL (ref 70–99)
HBA1C MFR BLD: 5.8 % (ref 0–5.6)
HCO3 SERPL-SCNC: 19 MMOL/L (ref 22–29)
HCT VFR BLD AUTO: 48.4 % (ref 40–53)
HDLC SERPL-MCNC: 38 MG/DL
HGB BLD-MCNC: 16.5 G/DL (ref 13.3–17.7)
LDLC SERPL CALC-MCNC: 13 MG/DL
MCH RBC QN AUTO: 31.6 PG (ref 26.5–33)
MCHC RBC AUTO-ENTMCNC: 34.1 G/DL (ref 31.5–36.5)
MCV RBC AUTO: 93 FL (ref 78–100)
NONHDLC SERPL-MCNC: 61 MG/DL
PLATELET # BLD AUTO: 288 10E3/UL (ref 150–450)
POTASSIUM SERPL-SCNC: 4.4 MMOL/L (ref 3.4–5.3)
PROT SERPL-MCNC: 7 G/DL (ref 6.4–8.3)
RBC # BLD AUTO: 5.22 10E6/UL (ref 4.4–5.9)
SODIUM SERPL-SCNC: 139 MMOL/L (ref 135–145)
TRIGL SERPL-MCNC: 240 MG/DL
WBC # BLD AUTO: 8.6 10E3/UL (ref 4–11)

## 2025-06-10 PROCEDURE — 83036 HEMOGLOBIN GLYCOSYLATED A1C: CPT | Performed by: NURSE PRACTITIONER

## 2025-06-10 PROCEDURE — 80061 LIPID PANEL: CPT | Performed by: NURSE PRACTITIONER

## 2025-06-10 PROCEDURE — 80053 COMPREHEN METABOLIC PANEL: CPT | Performed by: NURSE PRACTITIONER

## 2025-06-10 PROCEDURE — 85027 COMPLETE CBC AUTOMATED: CPT | Performed by: NURSE PRACTITIONER

## 2025-06-10 PROCEDURE — 36415 COLL VENOUS BLD VENIPUNCTURE: CPT | Performed by: NURSE PRACTITIONER

## 2025-06-10 PROCEDURE — 93010 ELECTROCARDIOGRAM REPORT: CPT | Performed by: STUDENT IN AN ORGANIZED HEALTH CARE EDUCATION/TRAINING PROGRAM

## 2025-06-10 RX ORDER — ASPIRIN 81 MG/1
81 TABLET, CHEWABLE ORAL DAILY
COMMUNITY
Start: 2025-06-10 | End: 2025-06-10

## 2025-06-10 ASSESSMENT — PAIN SCALES - GENERAL: PAINLEVEL_OUTOF10: NO PAIN (0)

## 2025-06-10 NOTE — PATIENT INSTRUCTIONS
"That shingles vaccine we talked about is called \"Shingrix\".     Medicare doesn't cover shingles vaccines here so go ahead and get that at the pharmacy.    I will get your paperwork faxed to your surgeon.    Follow your surgeon's directions regarding eating and drinking prior to surgery.     Medications you can take the morning of surgery include any inhalers and: metoprolol    No advil, ibuprofen, aleve, naproxen, or aspirin at least 5 days before surgery. Tylenol is ok.    Stop all over-the-counter supplements/vitamins 2 weeks prior to surgery    Your surgeon will manage any complications after your procedure.    Colonoscopy scheduling information is highlighted.     Keep checking pressures at home and let me know if >140/90    "

## 2025-06-10 NOTE — PROGRESS NOTES
Preoperative Evaluation  [facility]  [address]  [place] PROF [place]  [facility] [address]  Phone: [phone]  Fax: [phone]  Primary Provider: Shawn Lee NP  Pre-op Performing Provider: Shawn Lee NP  Smith 10, 2025             6/5/2025   Surgical Information   What procedure is being done? Pre operation physical prostrate surgery   Facility or Hospital where procedure/surgery will be performed: [facility]   Who is doing the procedure / surgery? Dr Das   Date of surgery / procedure: July 8th   Time of surgery / procedure: 10:00   Where do you plan to recover after surgery? at home with family     Fax number for surgical facility: [phone]    Assessment & Plan     The proposed surgical procedure is considered INTERMEDIATE risk.    Pre-operative general physical examination  Medically optimized for procedure    Prostate cancer (H)  Planned prostatectomy    Essential hypertension  Elevated today but controlled at urology and at home.  Asymptomatic.  Continue to check at home and report of home BP numbers are rising >140/90    - Comprehensive metabolic panel (BMP + Alb, Alk Phos, ALT, AST, Total. Bili, TP); Future  - CBC with platelets; Future    Coronary artery disease involving native coronary artery of native heart without angina pectoris  Asymptomatic    - Lipid panel reflex to direct LDL Non-fasting; Future  - Comprehensive metabolic panel (BMP + Alb, Alk Phos, ALT, AST, Total. Bili, TP); Future  - Lipid panel reflex to direct LDL Fasting; Future  - EKG 12-lead, tracing only    Prediabetes  Recheck  - Hemoglobin A1c; Future    Familial hypercholesterolemia  On statin therapy    S/P CABG (coronary artery bypass graft)    History of non-ST elevation myocardial infarction (NSTEMI)    Screen for colon cancer  - Colonoscopy Screening  Referral; Future            - No identified additional risk factors other than previously  addressed    Hold NSAIDs 5 days prior to procedure.  Hold supplements 2 weeks prior.  Take metoprolol a.m. of procedure with small sip of water.    Recommendation  Approval given to proceed with proposed procedure, without further diagnostic evaluation.    The longitudinal plan of care for the diagnosis(es)/condition(s) as documented were addressed during this visit. Due to the added complexity in care, I will continue to support Hong in the subsequent management and with ongoing continuity of care.      Subjective   Hong is a 69 year old, presenting for the following:  Pre-Op Exam (Dr. Das [facility] July 8th 2025)          6/10/2025     6:50 AM   Additional Questions   Roomed by Kaycee ZAMORA LPN         6/10/2025   Forms   Any forms needing to be completed Yes     HPI: Elevated PSA noted in 2024 and repeat in 2025.  Biopsy confirmed prostate cancer and urology recommended prostatectomy.          6/5/2025   Pre-Op Questionnaire   Have you ever had a heart attack or stroke? (!) YES NSTEMI status post CABG LIMA to LAD and vein graft to diagonal branch obtuse marginal 3 and RPDA   Have you ever had surgery on your heart or blood vessels, such as a stent placement, a coronary artery bypass, or surgery on an artery in your head, neck, heart, or legs? (!) YES CABG   Do you have chest pain with activity? No   Do you have a history of heart failure? (!) YES recovered   Do you currently have a cold, bronchitis or symptoms of other infection? No   Do you have a cough, shortness of breath, or wheezing? No   Do you or anyone in your family have previous history of blood clots? No   Do you or does anyone in your family have a serious bleeding problem such as prolonged bleeding following surgeries or cuts? No   Have you ever had problems with anemia or been told to take iron pills? No   Have you had any abnormal blood loss such as black, tarry or bloody stools? No   Have you ever had a blood transfusion? No   Are you  willing to have a blood transfusion if it is medically needed before, during, or after your surgery? Yes   Have you or any of your relatives ever had problems with anesthesia? No   Do you have sleep apnea, excessive snoring or daytime drowsiness? No   Do you have any artifical heart valves or other implanted medical devices like a pacemaker, defibrillator, or continuous glucose monitor? No   Do you have artificial joints? No   Are you allergic to latex? No     Advance Care Planning  Previously reviewed    Preoperative Review of    reviewed - no record of controlled substances prescribed.    Status of Chronic Conditions:  See problem list for active medical problems.  Problems all longstanding and stable, except as noted/documented.  See ROS for pertinent symptoms related to these conditions.    Patient Active Problem List    Diagnosis Date Noted    Prediabetes 05/10/2024     Priority: Medium    Elevated prostate specific antigen (PSA) 05/10/2024     Priority: Medium    Familial hypercholesterolemia 03/13/2020     Priority: Medium    History of non-ST elevation myocardial infarction (NSTEMI) 05/23/2019     Priority: Medium    S/P CABG (coronary artery bypass graft)      Priority: Medium    Coronary artery disease involving native coronary artery of native heart without angina pectoris      Priority: Medium    Dyslipidemia      Priority: Medium    Hypertension      Priority: Medium     Created by Conversion  Replacement Utility updated for latest IMO load          Past Medical History:   Diagnosis Date    Acute non Q wave MI (myocardial infarction), initial episode of care (H) 2/18/2019    Coronary artery disease 2003    Stent placement x3    Essential hypertension     Hyperlipidemia     Non-ST elevation myocardial infarction (NSTEMI) (H)     Peptic ulcer disease     Postoperative nausea and vomiting      Past Surgical History:   Procedure Laterality Date    CV CORONARY ANGIOGRAM N/A 02/18/2019    Procedure:  Coronary Angiogram;  Surgeon: Sesar Macdonald MD;  Location: [facility];  Service: Cardiology    CV LEFT HEART CATHETERIZATION WITHOUT LEFT VENTRICULOGRAM Left 02/18/2019    Procedure: Left Heart Catheterization Without Left Ventriculogram;  Surgeon: Sesar Macdonald MD;  Location: [facility];  Service: Cardiology    MRI BIOPSY PROSTATE Bilateral 04/15/2025    SKIN CANCER EXCISION Right     ear    TONSILLECTOMY      TUMOR REMOVAL      Scalp.  Benign.    [facility] CABG, ARTERIAL, FOUR+ N/A 02/20/2019    Procedure: anesthesia, transesophageal echocardiogram, CORONARY ARTERY BYPASS GRAFT X4, left internal mammary artery, endoscopic vein harvest;  Surgeon: Radha Sandoval MD;  Location: [facility] Main OR;  Service: Cardiovascular    [facility] CORONARY STENT PERCUT, INITIAL VESSEL  01/01/2003    x3- Cath Stent Placement;  Proc Date: 01/01/2003;  Comments: LIZ MID RIGHT POSTEIOR ; ; PROX FIRST DIAGNOL     Current Outpatient Medications   Medication Sig Dispense Refill    aspirin (ASA) 81 MG chewable tablet Take 81 mg by mouth daily.      evolocumab (REPATHA SURECLICK) 140 MG/ML prefilled autoinjector INJECT 1 ML (140 MG) SUBCUTANEOUSLY EVERY 14 DAYS. 6 mL 2    lisinopril (ZESTRIL) 10 MG tablet TAKE 1 TABLET (10 MG) BY MOUTH DAILY. 90 tablet 2    metoprolol tartrate (LOPRESSOR) 50 MG tablet TAKE 1 TABLET BY MOUTH TWICE A  tablet 1    rosuvastatin (CRESTOR) 40 MG tablet TAKE 1 TABLET BY MOUTH EVERY DAY 90 tablet 2       Allergies   Allergen Reactions    Amoxicillin Anaphylaxis and Swelling    Penicillins Anaphylaxis        Social History     Tobacco Use    Smoking status: Never     Passive exposure: Never    Smokeless tobacco: Never   Substance Use Topics    Alcohol use: No     Family History   Problem Relation Age of Onset    Coronary Artery Disease Father 42    Hyperlipidemia Father     Hyperlipidemia Sister     Heart Disease Paternal Grandfather      History   Drug Use No        "      Review of Systems  Constitutional, HEENT, cardiovascular, pulmonary, gi and gu systems are negative, except as otherwise noted.    Objective    BP (!) 155/90 (BP Location: Right arm, Patient Position: Sitting, Cuff Size: Adult Regular)   Pulse 73   Temp 98.1  F (36.7  C) (Oral)   Resp 16   Ht 1.695 m (5' 6.75\")   Wt 79.8 kg (175 lb 14.8 oz)   SpO2 97%   BMI 27.76 kg/m     Estimated body mass index is 27.76 kg/m  as calculated from the following:    Height as of this encounter: 1.695 m (5' 6.75\").    Weight as of this encounter: 79.8 kg (175 lb 14.8 oz).  Physical Exam  GENERAL: alert and no distress  EYES: Eyes grossly normal to inspection, PERRL and conjunctivae and sclerae normal  HENT: ear canals and TM's normal, nose and mouth without ulcers or lesions  NECK: no adenopathy, no asymmetry, masses, or scars  RESP: lungs clear to auscultation - no rales, rhonchi or wheezes  CV: regular rate and rhythm, normal S1 S2, no S3 or S4, no murmur, click or rub, no peripheral edema  ABDOMEN: soft, nontender, no hepatosplenomegaly, no masses and bowel sounds normal  MS: no gross musculoskeletal defects noted, no edema  SKIN: no suspicious lesions or rashes  NEURO: Normal strength and tone, mentation intact and speech normal  PSYCH: mentation appears normal, affect normal/bright    Recent Labs   Lab Test 01/20/25  0847   A1C 5.6        Diagnostics  Labs pending at this time.  Results will be reviewed when available.   ECG results from 06/10/25   EKG 12-lead, tracing only     Value    Systolic Blood Pressure     Diastolic Blood Pressure     Ventricular Rate 72    Atrial Rate 72    OR Interval 184    QRS Duration 90        QTc 438    P Axis 51    R AXIS 39    T Axis 42    Interpretation ECG      Sinus rhythm with Premature atrial complexes  Otherwise normal ECG  When compared with ECG of 21-Feb-2019 07:50,  Premature atrial complexes are now Present  ST less elevated in Lateral leads  T wave inversion no " longer evident in Anterior leads           Revised Cardiac Risk Index (RCRI)  The patient has the following serious cardiovascular risks for perioperative complications:   - Coronary Artery Disease (MI, positive stress test, angina, Qs on EKG) = 1 point     RCRI Interpretation: 1 point: Class II (low risk - 0.9% complication rate)         Signed Electronically by: Shawn Lee NP  A copy of this evaluation report is provided to the requesting physician.

## 2025-06-11 LAB
ATRIAL RATE - MUSE: 72 BPM
DIASTOLIC BLOOD PRESSURE - MUSE: NORMAL MMHG
INTERPRETATION ECG - MUSE: NORMAL
P AXIS - MUSE: 51 DEGREES
PR INTERVAL - MUSE: 184 MS
QRS DURATION - MUSE: 90 MS
QT - MUSE: 400 MS
QTC - MUSE: 438 MS
R AXIS - MUSE: 39 DEGREES
SYSTOLIC BLOOD PRESSURE - MUSE: NORMAL MMHG
T AXIS - MUSE: 42 DEGREES
VENTRICULAR RATE- MUSE: 72 BPM

## 2025-06-12 ENCOUNTER — RESULTS FOLLOW-UP (OUTPATIENT)
Dept: FAMILY MEDICINE | Facility: CLINIC | Age: 70
End: 2025-06-12

## 2025-06-22 ENCOUNTER — HEALTH MAINTENANCE LETTER (OUTPATIENT)
Age: 70
End: 2025-06-22

## 2025-06-25 ENCOUNTER — CARE COORDINATION (OUTPATIENT)
Dept: UROLOGY | Facility: CLINIC | Age: 70
End: 2025-06-25
Payer: COMMERCIAL

## 2025-07-03 RX ORDER — ASPIRIN 81 MG/1
81 TABLET ORAL DAILY
COMMUNITY

## 2025-07-03 NOTE — PROGRESS NOTES
PTA medications updated by Medication Scribe prior to surgery via phone call with patient (last doses completed by Nurse)     Medication history sources: Patient, Surescripts, and H&P  In the past week, patient estimated taking medication this percent of the time: Greater than 90%      Significant changes made to the medication list:  Patient reports no longer taking the following meds (med scribe removed from PTA med list): Lisinopril      Additional medication history information:   None    Medication reconciliation completed by provider prior to medication history? No    Time spent in this activity: 15 minutes    The information provided in this note is only as accurate as the sources available at the time of update(s)      Prior to Admission medications    Medication Sig Last Dose Taking? Auth Provider Long Term End Date   aspirin 81 MG EC tablet Take 81 mg by mouth daily. 6/27/2025 Morning Yes Reported, Patient No    evolocumab (REPATHA SURECLICK) 140 MG/ML prefilled autoinjector INJECT 1 ML (140 MG) SUBCUTANEOUSLY EVERY 14 DAYS. 7/3/2025 Morning Yes Mehran Lozada MD     metoprolol tartrate (LOPRESSOR) 50 MG tablet TAKE 1 TABLET BY MOUTH TWICE A DAY Morning Yes Shawn Lee NP Yes    rosuvastatin (CRESTOR) 40 MG tablet TAKE 1 TABLET BY MOUTH EVERY DAY Morning Yes Mehran Lozada MD Yes        Medication history completed by: Fritz Bautista

## 2025-07-08 ENCOUNTER — ANESTHESIA (OUTPATIENT)
Dept: SURGERY | Facility: CLINIC | Age: 70
DRG: 708 | End: 2025-07-08
Payer: COMMERCIAL

## 2025-07-08 ENCOUNTER — HOSPITAL ENCOUNTER (INPATIENT)
Facility: CLINIC | Age: 70
LOS: 1 days | Discharge: HOME | DRG: 708 | End: 2025-07-09
Attending: UROLOGY | Admitting: UROLOGY
Payer: COMMERCIAL

## 2025-07-08 ENCOUNTER — ANESTHESIA EVENT (OUTPATIENT)
Dept: SURGERY | Facility: CLINIC | Age: 70
DRG: 708 | End: 2025-07-08
Payer: COMMERCIAL

## 2025-07-08 DIAGNOSIS — C61 PROSTATE CANCER (H): Primary | ICD-10-CM

## 2025-07-08 LAB
ABO + RH BLD: NORMAL
BLD GP AB SCN SERPL QL: NEGATIVE
CREAT SERPL-MCNC: 0.94 MG/DL (ref 0.67–1.17)
EGFRCR SERPLBLD CKD-EPI 2021: 88 ML/MIN/1.73M2
GLUCOSE SERPL-MCNC: 100 MG/DL (ref 70–99)
POTASSIUM SERPL-SCNC: 4 MMOL/L (ref 3.4–5.3)
SPECIMEN EXP DATE BLD: NORMAL

## 2025-07-08 PROCEDURE — 250N000011 HC RX IP 250 OP 636: Performed by: NURSE ANESTHETIST, CERTIFIED REGISTERED

## 2025-07-08 PROCEDURE — 250N000009 HC RX 250: Performed by: NURSE ANESTHETIST, CERTIFIED REGISTERED

## 2025-07-08 PROCEDURE — 0VT34ZZ RESECTION OF BILATERAL SEMINAL VESICLES, PERCUTANEOUS ENDOSCOPIC APPROACH: ICD-10-PCS | Performed by: UROLOGY

## 2025-07-08 PROCEDURE — 120N000001 HC R&B MED SURG/OB

## 2025-07-08 PROCEDURE — 999N000141 HC STATISTIC PRE-PROCEDURE NURSING ASSESSMENT: Performed by: UROLOGY

## 2025-07-08 PROCEDURE — 258N000003 HC RX IP 258 OP 636: Performed by: ANESTHESIOLOGY

## 2025-07-08 PROCEDURE — 710N000009 HC RECOVERY PHASE 1, LEVEL 1, PER MIN: Performed by: UROLOGY

## 2025-07-08 PROCEDURE — 250N000009 HC RX 250: Performed by: UROLOGY

## 2025-07-08 PROCEDURE — 250N000009 HC RX 250: Performed by: ANESTHESIOLOGY

## 2025-07-08 PROCEDURE — 88307 TISSUE EXAM BY PATHOLOGIST: CPT | Mod: 26 | Performed by: PATHOLOGY

## 2025-07-08 PROCEDURE — 250N000013 HC RX MED GY IP 250 OP 250 PS 637: Performed by: UROLOGY

## 2025-07-08 PROCEDURE — 88309 TISSUE EXAM BY PATHOLOGIST: CPT | Mod: 26 | Performed by: PATHOLOGY

## 2025-07-08 PROCEDURE — 36415 COLL VENOUS BLD VENIPUNCTURE: CPT | Performed by: ANESTHESIOLOGY

## 2025-07-08 PROCEDURE — 272N000001 HC OR GENERAL SUPPLY STERILE: Performed by: UROLOGY

## 2025-07-08 PROCEDURE — 84132 ASSAY OF SERUM POTASSIUM: CPT | Performed by: ANESTHESIOLOGY

## 2025-07-08 PROCEDURE — 82947 ASSAY GLUCOSE BLOOD QUANT: CPT | Performed by: ANESTHESIOLOGY

## 2025-07-08 PROCEDURE — 250N000011 HC RX IP 250 OP 636: Performed by: ANESTHESIOLOGY

## 2025-07-08 PROCEDURE — 250N000013 HC RX MED GY IP 250 OP 250 PS 637: Performed by: STUDENT IN AN ORGANIZED HEALTH CARE EDUCATION/TRAINING PROGRAM

## 2025-07-08 PROCEDURE — 250N000009 HC RX 250: Performed by: STUDENT IN AN ORGANIZED HEALTH CARE EDUCATION/TRAINING PROGRAM

## 2025-07-08 PROCEDURE — 82565 ASSAY OF CREATININE: CPT | Performed by: ANESTHESIOLOGY

## 2025-07-08 PROCEDURE — 250N000011 HC RX IP 250 OP 636: Performed by: UROLOGY

## 2025-07-08 PROCEDURE — 0VT04ZZ RESECTION OF PROSTATE, PERCUTANEOUS ENDOSCOPIC APPROACH: ICD-10-PCS | Performed by: UROLOGY

## 2025-07-08 PROCEDURE — 250N000025 HC SEVOFLURANE, PER MIN: Performed by: UROLOGY

## 2025-07-08 PROCEDURE — P9045 ALBUMIN (HUMAN), 5%, 250 ML: HCPCS | Mod: JZ | Performed by: ANESTHESIOLOGY

## 2025-07-08 PROCEDURE — 8E0W4CZ ROBOTIC ASSISTED PROCEDURE OF TRUNK REGION, PERCUTANEOUS ENDOSCOPIC APPROACH: ICD-10-PCS | Performed by: UROLOGY

## 2025-07-08 PROCEDURE — 07BC4ZZ EXCISION OF PELVIS LYMPHATIC, PERCUTANEOUS ENDOSCOPIC APPROACH: ICD-10-PCS | Performed by: UROLOGY

## 2025-07-08 PROCEDURE — 88309 TISSUE EXAM BY PATHOLOGIST: CPT | Mod: TC | Performed by: UROLOGY

## 2025-07-08 PROCEDURE — 258N000001 HC RX 258: Performed by: UROLOGY

## 2025-07-08 PROCEDURE — 370N000017 HC ANESTHESIA TECHNICAL FEE, PER MIN: Performed by: UROLOGY

## 2025-07-08 PROCEDURE — 360N000080 HC SURGERY LEVEL 7, PER MIN: Performed by: UROLOGY

## 2025-07-08 PROCEDURE — 86900 BLOOD TYPING SEROLOGIC ABO: CPT | Performed by: ANESTHESIOLOGY

## 2025-07-08 PROCEDURE — 0VBQ4ZZ EXCISION OF BILATERAL VAS DEFERENS, PERCUTANEOUS ENDOSCOPIC APPROACH: ICD-10-PCS | Performed by: UROLOGY

## 2025-07-08 RX ORDER — APREPITANT 40 MG/1
40 CAPSULE ORAL ONCE
Status: COMPLETED | OUTPATIENT
Start: 2025-07-08 | End: 2025-07-08

## 2025-07-08 RX ORDER — NALOXONE HYDROCHLORIDE 0.4 MG/ML
0.1 INJECTION, SOLUTION INTRAMUSCULAR; INTRAVENOUS; SUBCUTANEOUS
Status: DISCONTINUED | OUTPATIENT
Start: 2025-07-08 | End: 2025-07-08 | Stop reason: HOSPADM

## 2025-07-08 RX ORDER — NALOXONE HYDROCHLORIDE 0.4 MG/ML
0.4 INJECTION, SOLUTION INTRAMUSCULAR; INTRAVENOUS; SUBCUTANEOUS
Status: DISCONTINUED | OUTPATIENT
Start: 2025-07-08 | End: 2025-07-09 | Stop reason: HOSPADM

## 2025-07-08 RX ORDER — HYDROMORPHONE HCL IN WATER/PF 6 MG/30 ML
0.4 PATIENT CONTROLLED ANALGESIA SYRINGE INTRAVENOUS
Status: DISCONTINUED | OUTPATIENT
Start: 2025-07-08 | End: 2025-07-09 | Stop reason: HOSPADM

## 2025-07-08 RX ORDER — FENTANYL CITRATE 50 UG/ML
INJECTION, SOLUTION INTRAMUSCULAR; INTRAVENOUS PRN
Status: DISCONTINUED | OUTPATIENT
Start: 2025-07-08 | End: 2025-07-08

## 2025-07-08 RX ORDER — CEFAZOLIN SODIUM/WATER 2 G/20 ML
2 SYRINGE (ML) INTRAVENOUS SEE ADMIN INSTRUCTIONS
Status: DISCONTINUED | OUTPATIENT
Start: 2025-07-08 | End: 2025-07-08 | Stop reason: HOSPADM

## 2025-07-08 RX ORDER — NALOXONE HYDROCHLORIDE 0.4 MG/ML
0.2 INJECTION, SOLUTION INTRAMUSCULAR; INTRAVENOUS; SUBCUTANEOUS
Status: DISCONTINUED | OUTPATIENT
Start: 2025-07-08 | End: 2025-07-09 | Stop reason: HOSPADM

## 2025-07-08 RX ORDER — LIDOCAINE 40 MG/G
CREAM TOPICAL
Status: DISCONTINUED | OUTPATIENT
Start: 2025-07-08 | End: 2025-07-09 | Stop reason: HOSPADM

## 2025-07-08 RX ORDER — AMOXICILLIN 250 MG
1 CAPSULE ORAL 2 TIMES DAILY
Status: DISCONTINUED | OUTPATIENT
Start: 2025-07-08 | End: 2025-07-09 | Stop reason: HOSPADM

## 2025-07-08 RX ORDER — MAGNESIUM HYDROXIDE 1200 MG/15ML
LIQUID ORAL PRN
Status: DISCONTINUED | OUTPATIENT
Start: 2025-07-08 | End: 2025-07-08 | Stop reason: HOSPADM

## 2025-07-08 RX ORDER — ACETAMINOPHEN 325 MG/1
975 TABLET ORAL ONCE
Status: COMPLETED | OUTPATIENT
Start: 2025-07-08 | End: 2025-07-08

## 2025-07-08 RX ORDER — ONDANSETRON 2 MG/ML
INJECTION INTRAMUSCULAR; INTRAVENOUS PRN
Status: DISCONTINUED | OUTPATIENT
Start: 2025-07-08 | End: 2025-07-08

## 2025-07-08 RX ORDER — PROPOFOL 10 MG/ML
INJECTION, EMULSION INTRAVENOUS PRN
Status: DISCONTINUED | OUTPATIENT
Start: 2025-07-08 | End: 2025-07-08

## 2025-07-08 RX ORDER — POLYETHYLENE GLYCOL 3350 17 G/17G
17 POWDER, FOR SOLUTION ORAL DAILY
Status: DISCONTINUED | OUTPATIENT
Start: 2025-07-09 | End: 2025-07-09 | Stop reason: HOSPADM

## 2025-07-08 RX ORDER — DEXAMETHASONE SODIUM PHOSPHATE 4 MG/ML
4 INJECTION, SOLUTION INTRA-ARTICULAR; INTRALESIONAL; INTRAMUSCULAR; INTRAVENOUS; SOFT TISSUE
Status: DISCONTINUED | OUTPATIENT
Start: 2025-07-08 | End: 2025-07-08 | Stop reason: HOSPADM

## 2025-07-08 RX ORDER — BISACODYL 10 MG
10 SUPPOSITORY, RECTAL RECTAL DAILY PRN
Status: DISCONTINUED | OUTPATIENT
Start: 2025-07-11 | End: 2025-07-09 | Stop reason: HOSPADM

## 2025-07-08 RX ORDER — SODIUM CHLORIDE, SODIUM LACTATE, POTASSIUM CHLORIDE, CALCIUM CHLORIDE 600; 310; 30; 20 MG/100ML; MG/100ML; MG/100ML; MG/100ML
INJECTION, SOLUTION INTRAVENOUS CONTINUOUS
Status: DISCONTINUED | OUTPATIENT
Start: 2025-07-08 | End: 2025-07-08 | Stop reason: HOSPADM

## 2025-07-08 RX ORDER — FENTANYL CITRATE 0.05 MG/ML
50 INJECTION, SOLUTION INTRAMUSCULAR; INTRAVENOUS EVERY 5 MIN PRN
Status: DISCONTINUED | OUTPATIENT
Start: 2025-07-08 | End: 2025-07-08 | Stop reason: HOSPADM

## 2025-07-08 RX ORDER — OXYCODONE HYDROCHLORIDE 5 MG/1
5 TABLET ORAL EVERY 6 HOURS PRN
Qty: 12 TABLET | Refills: 0 | Status: SHIPPED | OUTPATIENT
Start: 2025-07-08 | End: 2025-07-11

## 2025-07-08 RX ORDER — LIDOCAINE HYDROCHLORIDE 20 MG/ML
INJECTION, SOLUTION INFILTRATION; PERINEURAL PRN
Status: DISCONTINUED | OUTPATIENT
Start: 2025-07-08 | End: 2025-07-08

## 2025-07-08 RX ORDER — HYDROMORPHONE HCL IN WATER/PF 6 MG/30 ML
0.2 PATIENT CONTROLLED ANALGESIA SYRINGE INTRAVENOUS EVERY 5 MIN PRN
Status: DISCONTINUED | OUTPATIENT
Start: 2025-07-08 | End: 2025-07-08 | Stop reason: HOSPADM

## 2025-07-08 RX ORDER — OXYCODONE HYDROCHLORIDE 5 MG/1
10 TABLET ORAL EVERY 4 HOURS PRN
Status: DISCONTINUED | OUTPATIENT
Start: 2025-07-08 | End: 2025-07-09 | Stop reason: HOSPADM

## 2025-07-08 RX ORDER — ACETAMINOPHEN 650 MG/1
650 SUPPOSITORY RECTAL ONCE
Status: COMPLETED | OUTPATIENT
Start: 2025-07-08 | End: 2025-07-08

## 2025-07-08 RX ORDER — ONDANSETRON 4 MG/1
4 TABLET, ORALLY DISINTEGRATING ORAL EVERY 30 MIN PRN
Status: DISCONTINUED | OUTPATIENT
Start: 2025-07-08 | End: 2025-07-08 | Stop reason: HOSPADM

## 2025-07-08 RX ORDER — DEXAMETHASONE SODIUM PHOSPHATE 4 MG/ML
INJECTION, SOLUTION INTRA-ARTICULAR; INTRALESIONAL; INTRAMUSCULAR; INTRAVENOUS; SOFT TISSUE PRN
Status: DISCONTINUED | OUTPATIENT
Start: 2025-07-08 | End: 2025-07-08

## 2025-07-08 RX ORDER — HYDROMORPHONE HCL IN WATER/PF 6 MG/30 ML
0.4 PATIENT CONTROLLED ANALGESIA SYRINGE INTRAVENOUS EVERY 5 MIN PRN
Status: DISCONTINUED | OUTPATIENT
Start: 2025-07-08 | End: 2025-07-08 | Stop reason: HOSPADM

## 2025-07-08 RX ORDER — ONDANSETRON 2 MG/ML
4 INJECTION INTRAMUSCULAR; INTRAVENOUS EVERY 30 MIN PRN
Status: DISCONTINUED | OUTPATIENT
Start: 2025-07-08 | End: 2025-07-08 | Stop reason: HOSPADM

## 2025-07-08 RX ORDER — ACETAMINOPHEN 325 MG/1
975 TABLET ORAL EVERY 8 HOURS
Status: DISCONTINUED | OUTPATIENT
Start: 2025-07-08 | End: 2025-07-09 | Stop reason: HOSPADM

## 2025-07-08 RX ORDER — FENTANYL CITRATE 0.05 MG/ML
25 INJECTION, SOLUTION INTRAMUSCULAR; INTRAVENOUS EVERY 5 MIN PRN
Status: DISCONTINUED | OUTPATIENT
Start: 2025-07-08 | End: 2025-07-08 | Stop reason: HOSPADM

## 2025-07-08 RX ORDER — SODIUM CHLORIDE 9 MG/ML
INJECTION, SOLUTION INTRAVENOUS CONTINUOUS
Status: DISCONTINUED | OUTPATIENT
Start: 2025-07-08 | End: 2025-07-09 | Stop reason: HOSPADM

## 2025-07-08 RX ORDER — OXYCODONE HYDROCHLORIDE 5 MG/1
5 TABLET ORAL EVERY 4 HOURS PRN
Status: DISCONTINUED | OUTPATIENT
Start: 2025-07-08 | End: 2025-07-09 | Stop reason: HOSPADM

## 2025-07-08 RX ORDER — HYDROMORPHONE HCL IN WATER/PF 6 MG/30 ML
0.2 PATIENT CONTROLLED ANALGESIA SYRINGE INTRAVENOUS
Status: DISCONTINUED | OUTPATIENT
Start: 2025-07-08 | End: 2025-07-09 | Stop reason: HOSPADM

## 2025-07-08 RX ORDER — CEFAZOLIN SODIUM/WATER 2 G/20 ML
2 SYRINGE (ML) INTRAVENOUS
Status: COMPLETED | OUTPATIENT
Start: 2025-07-08 | End: 2025-07-08

## 2025-07-08 RX ORDER — GINSENG 100 MG
CAPSULE ORAL 3 TIMES DAILY
Status: DISCONTINUED | OUTPATIENT
Start: 2025-07-08 | End: 2025-07-09 | Stop reason: HOSPADM

## 2025-07-08 RX ORDER — SENNA AND DOCUSATE SODIUM 50; 8.6 MG/1; MG/1
1 TABLET, FILM COATED ORAL AT BEDTIME
Qty: 14 TABLET | Refills: 0 | Status: SHIPPED | OUTPATIENT
Start: 2025-07-08

## 2025-07-08 RX ORDER — BUPIVACAINE HYDROCHLORIDE 2.5 MG/ML
INJECTION, SOLUTION INFILTRATION; PERINEURAL PRN
Status: DISCONTINUED | OUTPATIENT
Start: 2025-07-08 | End: 2025-07-08 | Stop reason: HOSPADM

## 2025-07-08 RX ORDER — PROPOFOL 10 MG/ML
INJECTION, EMULSION INTRAVENOUS CONTINUOUS PRN
Status: DISCONTINUED | OUTPATIENT
Start: 2025-07-08 | End: 2025-07-08

## 2025-07-08 RX ADMIN — MIDAZOLAM 2 MG: 1 INJECTION INTRAMUSCULAR; INTRAVENOUS at 12:14

## 2025-07-08 RX ADMIN — FENTANYL CITRATE 50 MCG: 50 INJECTION INTRAMUSCULAR; INTRAVENOUS at 12:19

## 2025-07-08 RX ADMIN — PHENYLEPHRINE HYDROCHLORIDE 100 MCG: 10 INJECTION INTRAVENOUS at 15:34

## 2025-07-08 RX ADMIN — PHENYLEPHRINE HYDROCHLORIDE 100 MCG: 10 INJECTION INTRAVENOUS at 15:16

## 2025-07-08 RX ADMIN — ROCURONIUM BROMIDE 20 MG: 50 INJECTION, SOLUTION INTRAVENOUS at 14:57

## 2025-07-08 RX ADMIN — DEXMEDETOMIDINE HYDROCHLORIDE 8 MCG: 100 INJECTION, SOLUTION INTRAVENOUS at 12:50

## 2025-07-08 RX ADMIN — ACETAMINOPHEN 975 MG: 325 TABLET ORAL at 10:38

## 2025-07-08 RX ADMIN — Medication 200 MG: at 16:12

## 2025-07-08 RX ADMIN — ROCURONIUM BROMIDE 30 MG: 50 INJECTION, SOLUTION INTRAVENOUS at 14:08

## 2025-07-08 RX ADMIN — ALBUMIN (HUMAN): 12.5 SOLUTION INTRAVENOUS at 14:51

## 2025-07-08 RX ADMIN — PROPOFOL 200 MG: 10 INJECTION, EMULSION INTRAVENOUS at 12:19

## 2025-07-08 RX ADMIN — SODIUM CHLORIDE, SODIUM LACTATE, POTASSIUM CHLORIDE, AND CALCIUM CHLORIDE: .6; .31; .03; .02 INJECTION, SOLUTION INTRAVENOUS at 10:33

## 2025-07-08 RX ADMIN — OXYCODONE HYDROCHLORIDE 5 MG: 5 TABLET ORAL at 21:12

## 2025-07-08 RX ADMIN — HYDROMORPHONE HYDROCHLORIDE 0.5 MG: 1 INJECTION, SOLUTION INTRAMUSCULAR; INTRAVENOUS; SUBCUTANEOUS at 15:04

## 2025-07-08 RX ADMIN — PROPOFOL 50 MCG/KG/MIN: 10 INJECTION, EMULSION INTRAVENOUS at 12:19

## 2025-07-08 RX ADMIN — ROCURONIUM BROMIDE 20 MG: 50 INJECTION, SOLUTION INTRAVENOUS at 13:36

## 2025-07-08 RX ADMIN — FENTANYL CITRATE 50 MCG: 50 INJECTION INTRAMUSCULAR; INTRAVENOUS at 12:34

## 2025-07-08 RX ADMIN — SODIUM CHLORIDE, SODIUM LACTATE, POTASSIUM CHLORIDE, AND CALCIUM CHLORIDE: .6; .31; .03; .02 INJECTION, SOLUTION INTRAVENOUS at 13:53

## 2025-07-08 RX ADMIN — ONDANSETRON 4 MG: 2 INJECTION INTRAMUSCULAR; INTRAVENOUS at 16:03

## 2025-07-08 RX ADMIN — BACITRACIN: 500 OINTMENT TOPICAL at 21:07

## 2025-07-08 RX ADMIN — ROCURONIUM BROMIDE 30 MG: 50 INJECTION, SOLUTION INTRAVENOUS at 12:48

## 2025-07-08 RX ADMIN — DOCUSATE SODIUM 50 MG AND SENNOSIDES 8.6 MG 1 TABLET: 8.6; 5 TABLET, FILM COATED ORAL at 21:07

## 2025-07-08 RX ADMIN — LIDOCAINE HYDROCHLORIDE 100 MG: 20 INJECTION, SOLUTION INFILTRATION; PERINEURAL at 12:19

## 2025-07-08 RX ADMIN — HYDROMORPHONE HYDROCHLORIDE 0.5 MG: 1 INJECTION, SOLUTION INTRAMUSCULAR; INTRAVENOUS; SUBCUTANEOUS at 14:19

## 2025-07-08 RX ADMIN — ROCURONIUM BROMIDE 50 MG: 50 INJECTION, SOLUTION INTRAVENOUS at 12:19

## 2025-07-08 RX ADMIN — Medication 2 G: at 12:23

## 2025-07-08 RX ADMIN — APREPITANT 40 MG: 40 CAPSULE ORAL at 11:02

## 2025-07-08 RX ADMIN — PHENYLEPHRINE HYDROCHLORIDE 0.4 MCG/KG/MIN: 10 INJECTION INTRAVENOUS at 13:10

## 2025-07-08 RX ADMIN — DEXAMETHASONE SODIUM PHOSPHATE 4 MG: 4 INJECTION, SOLUTION INTRA-ARTICULAR; INTRALESIONAL; INTRAMUSCULAR; INTRAVENOUS; SOFT TISSUE at 12:27

## 2025-07-08 RX ADMIN — DEXMEDETOMIDINE HYDROCHLORIDE 12 MCG: 100 INJECTION, SOLUTION INTRAVENOUS at 12:43

## 2025-07-08 ASSESSMENT — ACTIVITIES OF DAILY LIVING (ADL)
ADLS_ACUITY_SCORE: 21
ADLS_ACUITY_SCORE: 41
ADLS_ACUITY_SCORE: 23
ADLS_ACUITY_SCORE: 23
ADLS_ACUITY_SCORE: 21
ADLS_ACUITY_SCORE: 21
ADLS_ACUITY_SCORE: 23
ADLS_ACUITY_SCORE: 21
ADLS_ACUITY_SCORE: 21
ADLS_ACUITY_SCORE: 23
ADLS_ACUITY_SCORE: 21
ADLS_ACUITY_SCORE: 21
ADLS_ACUITY_SCORE: 23
ADLS_ACUITY_SCORE: 23

## 2025-07-08 NOTE — DISCHARGE INSTRUCTIONS
Discharge instructions after Robotic Assisted Laparoscopic Prostatectomy (RALP):    Activity  - No strenuous exercise for 6 weeks.  - No lifting, pushing, pulling more than 10 pounds for 6 weeks.   - Do not strain with bowel movements.  - Do not drive until you can press the brake pedal quickly and fully without pain.     Diet  You may resume your regular post-procedure diet.  Many patients do not regain their full appetite for several weeks after surgery.  Take it slow, eating small meals frequently.  If you notice you are passing less gas or feeling bloated, stop eating solid foods and stick to liquids for the next several hours until you begin to pass gas again.  You are not required to have a bowel movement prior to leaving the hospital. Some patients take several days for bowel movements to return due to anesthesia and pain medications.     Incisions  - You may shower and get incisions wet starting 48 hrs after surgery.  - Do not scrub incisions or submerge wounds for 2 weeks or until seen in follow-up.   - If purple dermabond glue was used, avoid applying any lotions or ointments.   - Leave incision open to air. Cover with gauze only if needed for comfort or to protect clothing from drainage.   - The stitches do not need to be removed, they will dissolve on their own.    Catheter Care:  You will be discharged home with a urinary catheter in place. Your nurse will instruct you on how to care for this at home.  Empty the bag into the toilet several times daily   Keep the catheter secured to your thigh using the securement device. Ensure the tubing doesn t become snagged or tugged.  You may shower normally and allow soapy water to run over the catheter  Sometimes people notice build-up at the insertion site of the catheter to the urethra. You may gently wipe this away with a towelette or washcloth.     Common catheter issues:  Urine leaking around the catheter. This is a common finding in patients with a catheter  and is usually no cause for alarm. Typically, it is due to bladder spasms. Bladder spasms occur because your bladder is not used to a forgein object and tries to  squeeze  this out, releasing urine around the catheter. Sometimes patients can experience abdominal cramping. Typically, bladder spasms resolve after a few days. If you find bladder spasms particularly bothersome, ask your doctor about medication to calm these.   Occasionally, urine leaking from around that catheter can be due to a blockage of the catheter- especially if your urine appears bloody. If the urine does not appear to be draining through the tube and your bladder feels full, please contact our department.  The feeling of needing to urinate. Some patients feel the persistent need to urinate with the catheter in place. As long as the catheter is still draining, this is likely due to a  forgein object sensation  in your bladder. Even though your bladder is empty of urine, the catheter balloon gives a false sensation that your bladder is full. This typically subsides after 24-48 hours but there are medications that may be able to help with this if the feeling is too bothersome.   Catheter sliding in and out. There is a balloon filled with water inside your bladder to prevent the catheter from slipping out. The tubing is quite long and may slide back and forth freely. If the catheter does slide completely out, save the catheter and call our department immediately.   You may use Vaseline at the tip of the penis to prevent chafing  If you need extra catheter supplies (leg bags, securement devices), call the clinic during business hours to arrange for this.     Medications  - Transition from narcotic pain medications to tylenol (acetaminophen) as you are able.  Wean yourself off all pain medications as you are able.  - Some pain medications contain both tylenol (acetaminophen) and a narcotic (Norco, vicodin, percocet), do not take more than 4,000mg of  Tylenol (acetaminophen) from all sources in any 24 hour period.  - Narcotics can make you constipated.  Take over the counter fiber (metamucil or benefiber) and stool softeners (miralax, docusate or senna) while taking narcotic pain medications, but stop if you develop diarrhea.  - No driving or operating machinery while taking narcotic pain medications.    Follow-Up:  - Follow up on 7/18 in clinic as scheduled for catheter removal.  - MyChart can be utilized for non-urgent questions or concerns.   - For questions or concerns:  [facility]: [phone]  [facility]: [phone]  - Call or return sooner than your regularly scheduled visit if you develop any of the following: fever (greater than 101.5), uncontrolled pain, uncontrolled nausea or vomiting, catheter-related issues, worsening redness/swelling of your incision, or worsening blood in the urine.   - For emergencies, call [phone].

## 2025-07-08 NOTE — OR NURSING
OK by PUNEET De Paz to transfer to the floor. Glasses and bilat hearing aids returned to face. One belonging bag returned to pt. Pts son Moris called with the room number.

## 2025-07-08 NOTE — ANESTHESIA POSTPROCEDURE EVALUATION
Patient: Eleuterio Camejo    Procedure: Procedure(s):  PROSTATECTOMY, ROBOT-ASSISTED, WITH PELVIC LYMPHADENECTOMY       Anesthesia Type:  General    Note:  Disposition: Inpatient   Postop Pain Control: Uneventful            Sign Out: Well controlled pain   PONV: No   Neuro/Psych: Uneventful            Sign Out: Acceptable/Baseline neuro status   Airway/Respiratory: Uneventful            Sign Out: Acceptable/Baseline resp. status   CV/Hemodynamics: Uneventful            Sign Out: Acceptable CV status; No obvious hypovolemia; No obvious fluid overload   Other NRE:    DID A NON-ROUTINE EVENT OCCUR?        Last vitals:  Vitals Value Taken Time   /78 07/08/25 17:15   Temp 36.6  C (97.88  F) 07/08/25 17:30   Pulse 101 07/08/25 17:30   Resp 11 07/08/25 17:30   SpO2 96 % 07/08/25 17:30   Vitals shown include unfiled device data.    Electronically Signed By: Taya De Paz MD  July 8, 2025  5:32 PM

## 2025-07-08 NOTE — ANESTHESIA CARE TRANSFER NOTE
Patient: Eleuterio Camejo    Procedure: Procedure(s):  PROSTATECTOMY, ROBOT-ASSISTED, WITH PELVIC LYMPHADENECTOMY       Diagnosis: Prostate cancer (H) [C61]  Diagnosis Additional Information: No value filed.    Anesthesia Type:   General     Note:    Oropharynx: oropharynx clear of all foreign objects and spontaneously breathing  Level of Consciousness: drowsy  Oxygen Supplementation: face mask  Level of Supplemental Oxygen (L/min / FiO2): 8  Independent Airway: airway patency satisfactory and stable  Dentition: dentition unchanged  Vital Signs Stable: post-procedure vital signs reviewed and stable  Report to RN Given: handoff report given  Patient transferred to: PACU  Comments: Patient breathing spontaneously.  Follows commands.  Suctioned and extubated.  Exchanging air well.  Transferred to PACU with 8L O2 via mask.  Monitors on.  VSS.  Patent IV.  Report and transfer of care to RN.    Handoff Report: Identifed the Patient, Identified the Reponsible Provider, Reviewed the pertinent medical history, Discussed the surgical course, Reviewed Intra-OP anesthesia mangement and issues during anesthesia, Set expectations for post-procedure period and Allowed opportunity for questions and acknowledgement of understanding      Vitals:  Vitals Value Taken Time   /78 07/08/25 17:15   Temp 36.6  C (97.88  F) 07/08/25 17:19   Pulse 101 07/08/25 17:19   Resp 9 07/08/25 17:19   SpO2 97 % 07/08/25 17:19   Vitals shown include unfiled device data.    Electronically Signed By: SALIMA Nobles CRNA  July 8, 2025  5:21 PM

## 2025-07-08 NOTE — OP NOTE
SURGEON:  Anoop Josue MD     ASSISTANT: Dr. Masha Barreto     PREOPERATIVE DIAGNOSIS:  Prostate cancer.     POSTOPERATIVE DIAGNOSIS:  Prostate cancer.     PROCEDURE PERFORMED:  Robotic-assisted laparoscopic radical prostatectomy with bilateral pelvic lymph node dissection.     ANESTHESIA:  General.     COMPLICATIONS:  None.     SPECIMENS:    1.  Prostate and seminal vesicles  2.  Bilateral pelvic lymph nodes.     FINDINGS: Bilateral wide excision procedure     INDICATIONS FOR PROCEDURE: This is a 69-year-old gentleman who is found to have an enlarged prostate and intermediate risk prostate cancer who now presents for robotic prostatectomy.    DETAILS OF PROCEDURE: The patient was brought to the operating room where he underwent general endotracheal anesthetic.  He was secured to the table with tape.  A wallace device was placed to retract the head.  The abdomen was shaved.  The abdomen and penis were prepped and draped in the standard sterile fashion.    After appropriate timeout I inserted an 18 Occitan Oneil catheter to drain the bladder.  The umbilicus was tented up and a Veress needle was placed at the umbilicus.  I achieved 15mm of pressure with CO2 insufflation.  I then made an incision above umbilicus and placed a robotic trocar at this site.  I inserted the robotic camera and inspected the abdomen.  No injury had occurred.  Under direct visualization I then placed 2 right-sided robotic arm ports and one the left side.  An air seal assistant port was placed to the left of the camera port.  The patient was then brought to the 25 degree in Trendelenburg position and the robot was docked.    Adhesions of the sigmoid colon were taken down at the beginning of the case and the sigmoid colon was tented up.  I incised anteriorly into the peritoneal reflection to identify seminal vesicles and vas deferens bilaterally.  The 4 structures were cleared of their surrounding tissues and the vas deferens was cut on each  side.  I then released my retraction on sigmoid colon.  I then incised through the medial umbilical ligament on each side to develop the space of Retzius and take down the bladder.  I identified pubic arch and endopelvic fascia.  The superficial dorsal vein of the prostate was taken down with cautery.  I removed adipose tissue overlying the endopelvic fascia.  I then incised endopelvic fascia laterally on each side to identify the lateral limits of the prostate.  A 2-0 V-Loc suture was then placed twice around dorsal venous complex for hemostatic purposes later in the case.  I cut the needle and removed it from the body.    I next pulled on the Oneil catheter to identify the prostato-vesicle junction.  I incised anteriorly into the bladder neck to dissect the bladder neck free from the anterior base of the prostate.  I continued this dissection until I reached my Oneil catheter.  The Oneil catheter balloon was brought down and brought through the plane of the dissection for retraction purposes.  I then dissected the posterior bladder neck free from the posterior base of the prostate until I reached my seminal vesicles and vas deferens.    I next tented up the seminal vesicles and vas deferens and incised through Denonvilliers's fascia to identify the posterior capsule of the prostate.  I cleared off the posterior capsule of the prostate all the way to the apex of the prostate on each side.  I then incised through the pedicles of the prostate with a series of Weck clips.  I did not perform a nerve sparing procedure on either side and had ligated the neurovascular bundle with a series of Weck clips all the way to the apex of the prostate.    I looked anteriorly and I incised through dorsal venous complex.  I then dissected out the urethra and cut through urethra sharply.  The specimen was placed in an Endo Catch specimen bag through the assistant port and set aside for the time being.    I next performed a bilateral  pelvic lymph node dissection by dissecting free the lymph node packet between the external iliac vein and the obturator nerve on each side.  This was taken down with a series of Weck clips on each side.    I performed by vesicourethral reanastomosis by running a double-armed 3-O V lock suture being at the 6:00 and ending at the 12 o'clock position on each side.  The needles were cut and removed from the body.  I placed a new 18 Syriac Oneil catheter per urethra into the bladder.  I filled the balloon with 15 cc of sterile water.  I had irrigated the bladder until output was clear.  There was no leakage from the anastomosis.  I next removed by left-sided instrument and placed a Darryl-Hurley drain at the site.  I removed the trocar from the site intact in the Darryl-Hurley drain with a 2-0 silk suture.  All instruments were removed and the robot was de docked.  The patient was brought out of Trendelenburg position.  I increased the size of the supraumbilical incision so that I could bring the Endo Catch specimen out through this site.  I reclosed fascia at this site with #1 PDS figure-of-eight sutures.  All skin incisions were irrigated and closed with 4-0 Monocryl subcuticular stitches.  These were all covered with skin adhesive.  A drain sponge was placed over the Darryl-Hurley site.  The procedure was concluded at this point.    The patient tolerated the procedure well without complications.  He went to the postanesthetic care unit in good condition.  He will be admitted to the hospital floor from there.

## 2025-07-08 NOTE — ANESTHESIA PROCEDURE NOTES
Airway         Procedure Start/Stop Times: 7/8/2025 12:22 PM  Staff -        Anesthesiologist:  Consuelo Huggins MD       CRNA: Hodgkins, Christine Marie Volp, APRN CRNA       Performed By: CRNA  Consent for Airway        Urgency: elective  Indications and Patient Condition       Indications for airway management: moshe-procedural       Induction type:intravenous       Mask difficulty assessment: 2 - vent by mask + OA or adjuvant +/- NMBA    Final Airway Details       Final airway type: endotracheal airway       Successful airway: ETT - single  Endotracheal Airway Details        ETT size (mm): 8.0       Cuffed: yes       Successful intubation technique: video laryngoscopy       VL Blade Size: Glidescope 4       Grade View of Cords: 1       Adjucts: stylet       Position: Right       Measured from: lips       Secured at (cm): 22       Bite block used: None    Post intubation assessment        Placement verified by: capnometry, equal breath sounds and chest rise        Number of attempts at approach: 1       Number of other approaches attempted: 0       Secured with: tape       Ease of procedure: easy       Dentition: Unchanged    Medication(s) Administered   Medication Administration Time: 7/8/2025 12:22 PM

## 2025-07-08 NOTE — ANESTHESIA PREPROCEDURE EVALUATION
Anesthesia Pre-Procedure Evaluation    Patient: Eleuterio Camejo   MRN: [id number] : 1955          Procedure : Procedure(s):  PROSTATECTOMY, ROBOT-ASSISTED, WITH PELVIC LYMPHADENECTOMY         Past Medical History:   Diagnosis Date    Acute non Q wave MI (myocardial infarction), initial episode of care (H) 2019    Coronary artery disease 2003    Stent placement x3    Essential hypertension     Hyperlipidemia     Non-ST elevation myocardial infarction (NSTEMI) (H)     Peptic ulcer disease     Postoperative nausea and vomiting       Past Surgical History:   Procedure Laterality Date    CV CORONARY ANGIOGRAM N/A 2019    Procedure: Coronary Angiogram;  Surgeon: Sesar Macdonald MD;  Location: [facility] Cath Lab;  Service: Cardiology    CV LEFT HEART CATHETERIZATION WITHOUT LEFT VENTRICULOGRAM Left 2019    Procedure: Left Heart Catheterization Without Left Ventriculogram;  Surgeon: Sesar Macdonald MD;  Location: [facility] Lab;  Service: Cardiology    MRI BIOPSY PROSTATE Bilateral 04/15/2025    SKIN CANCER EXCISION Right     ear    TONSILLECTOMY      TUMOR REMOVAL      Scalp.  Benign.    [facility] CABG, ARTERIAL, FOUR+ N/A 2019    Procedure: anesthesia, transesophageal echocardiogram, CORONARY ARTERY BYPASS GRAFT X4, left internal mammary artery, endoscopic vein harvest;  Surgeon: Radha Sandoval MD;  Location: [facility] OR;  Service: Cardiovascular    [facility] CORONARY STENT PERCUT, INITIAL VESSEL  01/01/2003    x3- Cath Stent Placement;  Proc Date: 2003;  Comments: LIZ MID RIGHT POSTEIOR ; ; PROX FIRST DIAGNOL      Allergies   Allergen Reactions    Amoxicillin Anaphylaxis and Swelling    Penicillins Anaphylaxis      Social History     Tobacco Use    Smoking status: Never     Passive exposure: Never    Smokeless tobacco: Never   Substance Use Topics    Alcohol use: No      Wt Readings from Last 1 Encounters:   25 78.4 kg (172 lb 14.4 oz)        Anesthesia  "Evaluation   Pt has had prior anesthetic.     History of anesthetic complications  - PONV.      ROS/MED HX  ENT/Pulmonary:    (-) sleep apnea   Neurologic:  - neg neurologic ROS     Cardiovascular:     (+) Dyslipidemia hypertension- -  CAD angina-  CABG- -                                      METS/Exercise Tolerance:     Hematologic:       Musculoskeletal:       GI/Hepatic:     (+) GERD, Asymptomatic on medication,                  Renal/Genitourinary:    (-) renal disease   Endo: Comment: Pre diabetes   (-) Type II DM   Psychiatric/Substance Use:       Infectious Disease:       Malignancy:   (+) Malignancy, History of Prostate.    Other:              Physical Exam  Airway  Mallampati: II  TM distance: >3 FB  Neck ROM: full  Mouth opening: >= 4 cm    Cardiovascular - normal exam   Dental   (+) Minor Abnormalities - some fillings, tiny chips      Pulmonary - normal exam      Neurological - normal exam  He appears awake, alert and oriented x3.    Other Findings       OUTSIDE LABS:  CBC:   Lab Results   Component Value Date    WBC 8.6 06/10/2025    WBC 7.3 05/09/2024    HGB 16.5 06/10/2025    HGB 16.5 05/09/2024    HCT 48.4 06/10/2025    HCT 48.4 05/09/2024     06/10/2025     05/09/2024     BMP:   Lab Results   Component Value Date     06/10/2025     05/09/2024    POTASSIUM 4.4 06/10/2025    POTASSIUM 4.5 05/09/2024    CHLORIDE 108 (H) 06/10/2025    CHLORIDE 107 05/09/2024    CO2 19 (L) 06/10/2025    CO2 22 05/09/2024    BUN 18.6 06/10/2025    BUN 18.4 05/09/2024    CR 0.99 06/10/2025    CR 0.85 05/09/2024     (H) 06/10/2025     (H) 05/09/2024     COAGS:   Lab Results   Component Value Date    PTT 28 02/17/2019    INR 1.24 (H) 02/21/2019     POC: No results found for: \"BGM\", \"HCG\", \"HCGS\"  HEPATIC:   Lab Results   Component Value Date    ALBUMIN 4.3 06/10/2025    PROTTOTAL 7.0 06/10/2025    ALT 45 06/10/2025    AST 40 06/10/2025    ALKPHOS 70 06/10/2025    BILITOTAL 0.3 " "06/10/2025     OTHER:   Lab Results   Component Value Date    PH 7.41 02/21/2019    A1C 5.8 (H) 06/10/2025    DANGELO 9.4 06/10/2025    MAG 2.6 02/24/2019    LIPASE 28 02/17/2019       Anesthesia Plan    ASA Status:  3      NPO Status: NPO Appropriate   Anesthesia Type: General.  Airway: oral.  Induction: intravenous.  Maintenance: Balanced.   Techniques and Equipment:       - Monitoring Plan: standard ASA monitoring     Consents    Anesthesia Plan(s) and associated risks, benefits, and realistic alternatives discussed. Questions answered and patient/representative(s) expressed understanding.     - Discussed:     - Discussed with:  Patient               Postoperative Care    Pain management: multimodal analgesia.     Comments:                   Consuelo Huggins MD    I have reviewed the pertinent notes and labs in the chart from the past 30 days and (re)examined the patient.  Any updates or changes from those notes are reflected in this note.    Clinically Significant Risk Factors Present on Admission                 # Drug Induced Platelet Defect: home medication list includes an antiplatelet medication   # Hypertension: Noted on problem list           # Overweight: Estimated body mass index is 29.68 kg/m  as calculated from the following:    Height as of this encounter: 1.626 m (5' 4\").    Weight as of this encounter: 78.4 kg (172 lb 14.4 oz).                    "

## 2025-07-08 NOTE — BRIEF OP NOTE
[facility]    Brief Operative Note    Pre-operative diagnosis: Prostate cancer (H) [C61]  Post-operative diagnosis Same as pre-operative diagnosis    Procedure: PROSTATECTOMY, ROBOT-ASSISTED, WITH PELVIC LYMPHADENECTOMY, Bilateral - Pelvis    Surgeon: Surgeons and Role:     * Anoop Josue MD - Primary  Anesthesia: General   Estimated Blood Loss: 400cc    Drains: 18Fr Oneil catheter with 15cc in balloon, 10Fr channel MEME  Specimens:   ID Type Source Tests Collected by Time Destination   1 : Prostate and Seminal Vesicles Tissue Prostate SURGICAL PATHOLOGY EXAM Anoop Josue MD 7/8/2025  3:52 PM    2 : Bilateral Pelvic Lymph Nodes Tissue Lymph Node(s) SURGICAL PATHOLOGY EXAM Anoop Josue MD 7/8/2025  3:05 PM      Post-op plan:   - Admit to urology  - Standard post op pain control (sched Tylenol, Toradol, PRN oxycodone/dilaudid)  - sips of clears on POD#0, advance to clears on POD#1  - up ad claudio  - SQH pending POD#1 AM Hgb   - Oneil to drainage  - MEME Cr ordered for AM   - Follow up on 7/18 as scheduled for catheter removal

## 2025-07-09 VITALS
HEIGHT: 64 IN | SYSTOLIC BLOOD PRESSURE: 120 MMHG | HEART RATE: 86 BPM | RESPIRATION RATE: 16 BRPM | WEIGHT: 172.9 LBS | TEMPERATURE: 98.4 F | DIASTOLIC BLOOD PRESSURE: 67 MMHG | OXYGEN SATURATION: 94 % | BODY MASS INDEX: 29.52 KG/M2

## 2025-07-09 LAB
ANION GAP SERPL CALCULATED.3IONS-SCNC: 11 MMOL/L (ref 7–15)
BUN SERPL-MCNC: 17.6 MG/DL (ref 8–23)
CALCIUM SERPL-MCNC: 8.4 MG/DL (ref 8.8–10.4)
CHLORIDE SERPL-SCNC: 104 MMOL/L (ref 98–107)
CREAT SERPL-MCNC: 0.86 MG/DL (ref 0.67–1.17)
EGFRCR SERPLBLD CKD-EPI 2021: >90 ML/MIN/1.73M2
ERYTHROCYTE [DISTWIDTH] IN BLOOD BY AUTOMATED COUNT: 13 % (ref 10–15)
GLUCOSE SERPL-MCNC: 132 MG/DL (ref 70–99)
HCO3 SERPL-SCNC: 23 MMOL/L (ref 22–29)
HCT VFR BLD AUTO: 37.9 % (ref 40–53)
HGB BLD-MCNC: 13.4 G/DL (ref 13.3–17.7)
MCH RBC QN AUTO: 32.3 PG (ref 26.5–33)
MCHC RBC AUTO-ENTMCNC: 35.4 G/DL (ref 31.5–36.5)
MCV RBC AUTO: 91 FL (ref 78–100)
PLATELET # BLD AUTO: 238 10E3/UL (ref 150–450)
POTASSIUM SERPL-SCNC: 4.8 MMOL/L (ref 3.4–5.3)
RBC # BLD AUTO: 4.15 10E6/UL (ref 4.4–5.9)
SODIUM SERPL-SCNC: 138 MMOL/L (ref 135–145)
WBC # BLD AUTO: 11.8 10E3/UL (ref 4–11)

## 2025-07-09 PROCEDURE — 250N000011 HC RX IP 250 OP 636: Performed by: STUDENT IN AN ORGANIZED HEALTH CARE EDUCATION/TRAINING PROGRAM

## 2025-07-09 PROCEDURE — 250N000009 HC RX 250: Performed by: STUDENT IN AN ORGANIZED HEALTH CARE EDUCATION/TRAINING PROGRAM

## 2025-07-09 PROCEDURE — 36415 COLL VENOUS BLD VENIPUNCTURE: CPT | Performed by: STUDENT IN AN ORGANIZED HEALTH CARE EDUCATION/TRAINING PROGRAM

## 2025-07-09 PROCEDURE — 85027 COMPLETE CBC AUTOMATED: CPT | Performed by: STUDENT IN AN ORGANIZED HEALTH CARE EDUCATION/TRAINING PROGRAM

## 2025-07-09 PROCEDURE — 80048 BASIC METABOLIC PNL TOTAL CA: CPT | Performed by: STUDENT IN AN ORGANIZED HEALTH CARE EDUCATION/TRAINING PROGRAM

## 2025-07-09 PROCEDURE — 258N000003 HC RX IP 258 OP 636: Performed by: STUDENT IN AN ORGANIZED HEALTH CARE EDUCATION/TRAINING PROGRAM

## 2025-07-09 PROCEDURE — 250N000013 HC RX MED GY IP 250 OP 250 PS 637: Performed by: STUDENT IN AN ORGANIZED HEALTH CARE EDUCATION/TRAINING PROGRAM

## 2025-07-09 RX ORDER — ENOXAPARIN SODIUM 100 MG/ML
40 INJECTION SUBCUTANEOUS EVERY 24 HOURS
Status: DISCONTINUED | OUTPATIENT
Start: 2025-07-09 | End: 2025-07-09 | Stop reason: HOSPADM

## 2025-07-09 RX ORDER — PROCHLORPERAZINE MALEATE 5 MG/1
5 TABLET ORAL EVERY 6 HOURS PRN
Status: DISCONTINUED | OUTPATIENT
Start: 2025-07-09 | End: 2025-07-09 | Stop reason: HOSPADM

## 2025-07-09 RX ORDER — TOLTERODINE TARTRATE 1 MG/1
2 TABLET, EXTENDED RELEASE ORAL 2 TIMES DAILY PRN
Status: DISCONTINUED | OUTPATIENT
Start: 2025-07-09 | End: 2025-07-09 | Stop reason: HOSPADM

## 2025-07-09 RX ORDER — KETOROLAC TROMETHAMINE 15 MG/ML
15 INJECTION, SOLUTION INTRAMUSCULAR; INTRAVENOUS EVERY 6 HOURS
Status: DISCONTINUED | OUTPATIENT
Start: 2025-07-09 | End: 2025-07-09 | Stop reason: HOSPADM

## 2025-07-09 RX ORDER — CALCIUM CARBONATE 500 MG/1
500 TABLET, CHEWABLE ORAL DAILY PRN
Status: DISCONTINUED | OUTPATIENT
Start: 2025-07-09 | End: 2025-07-09 | Stop reason: HOSPADM

## 2025-07-09 RX ORDER — METOPROLOL TARTRATE 50 MG
50 TABLET ORAL 2 TIMES DAILY
Status: DISCONTINUED | OUTPATIENT
Start: 2025-07-09 | End: 2025-07-09 | Stop reason: HOSPADM

## 2025-07-09 RX ORDER — ONDANSETRON 4 MG/1
4 TABLET, ORALLY DISINTEGRATING ORAL EVERY 6 HOURS PRN
Status: DISCONTINUED | OUTPATIENT
Start: 2025-07-09 | End: 2025-07-09 | Stop reason: HOSPADM

## 2025-07-09 RX ORDER — ROSUVASTATIN CALCIUM 20 MG/1
40 TABLET, COATED ORAL DAILY
Status: DISCONTINUED | OUTPATIENT
Start: 2025-07-09 | End: 2025-07-09 | Stop reason: HOSPADM

## 2025-07-09 RX ORDER — ONDANSETRON 2 MG/ML
4 INJECTION INTRAMUSCULAR; INTRAVENOUS EVERY 6 HOURS PRN
Status: DISCONTINUED | OUTPATIENT
Start: 2025-07-09 | End: 2025-07-09 | Stop reason: HOSPADM

## 2025-07-09 RX ADMIN — BACITRACIN: 500 OINTMENT TOPICAL at 09:29

## 2025-07-09 RX ADMIN — ACETAMINOPHEN 975 MG: 325 TABLET, FILM COATED ORAL at 17:40

## 2025-07-09 RX ADMIN — DOCUSATE SODIUM 50 MG AND SENNOSIDES 8.6 MG 1 TABLET: 8.6; 5 TABLET, FILM COATED ORAL at 09:27

## 2025-07-09 RX ADMIN — ROSUVASTATIN CALCIUM 40 MG: 20 TABLET, FILM COATED ORAL at 10:24

## 2025-07-09 RX ADMIN — SODIUM CHLORIDE: 0.9 INJECTION, SOLUTION INTRAVENOUS at 07:13

## 2025-07-09 RX ADMIN — BACITRACIN: 500 OINTMENT TOPICAL at 17:40

## 2025-07-09 RX ADMIN — ACETAMINOPHEN 975 MG: 325 TABLET, FILM COATED ORAL at 01:47

## 2025-07-09 RX ADMIN — OXYCODONE HYDROCHLORIDE 5 MG: 5 TABLET ORAL at 07:15

## 2025-07-09 RX ADMIN — POLYETHYLENE GLYCOL 3350 17 G: 17 POWDER, FOR SOLUTION ORAL at 09:27

## 2025-07-09 RX ADMIN — ENOXAPARIN SODIUM 40 MG: 40 INJECTION SUBCUTANEOUS at 10:53

## 2025-07-09 RX ADMIN — KETOROLAC TROMETHAMINE 15 MG: 15 INJECTION, SOLUTION INTRAMUSCULAR; INTRAVENOUS at 17:40

## 2025-07-09 RX ADMIN — ACETAMINOPHEN 975 MG: 325 TABLET, FILM COATED ORAL at 09:31

## 2025-07-09 RX ADMIN — METOPROLOL TARTRATE 50 MG: 50 TABLET, FILM COATED ORAL at 10:24

## 2025-07-09 RX ADMIN — KETOROLAC TROMETHAMINE 15 MG: 15 INJECTION, SOLUTION INTRAMUSCULAR; INTRAVENOUS at 10:24

## 2025-07-09 ASSESSMENT — ACTIVITIES OF DAILY LIVING (ADL)
ADLS_ACUITY_SCORE: 25
ADLS_ACUITY_SCORE: 23
ADLS_ACUITY_SCORE: 25
ADLS_ACUITY_SCORE: 23
ADLS_ACUITY_SCORE: 25

## 2025-07-09 NOTE — PLAN OF CARE
Date & Time: 7/8-7/9 23:00-07:30  Surgery/POD#: POD#1 Prostatectomy w/ pelvic lymphadenectomy  Behavior & Aggression: Green  Fall Risk: Yes  Orientation: A&Ox4  ABNL VS/O2: VSS on RA  ABNL Labs: See chart  Pain Management: PRN oxycodone given x1  Bowel/Bladder: Oneil in place. Not passing gas  Drains: L MEME  Wounds/incisions: x3 lap sites, x1 mini  Diet: Sips of clears  Number of times OUT OF BED this shift   Anticipated  DC Date: Pending

## 2025-07-09 NOTE — PLAN OF CARE
Goal Outcome Evaluation:             Date/Time:07/08/25 [id number]        Diagnosis:Robotic-assisted laparoscopic radical prostatectomy with bilateral pelvic lymph node dissection.   POD#:0  Mental Status:A&0x4  Activity/dangle:Not OOB  Diet:Per urology notes,sips of clears on POD #O Advance to clears on POD #1  Pain:PRN oxycodone given x 1  Oneil/Voiding:Oneil cath in place with bloody out put  Tele/Restraints/Iso: None  02/LDA:PIV infusing NS @ 100ml/hr,  SKIN:MEME drain x 1 to bulb suction,3 Lap site and 1 mini incision  D/C Date:Pending/TBD  Other Info:

## 2025-07-09 NOTE — PROGRESS NOTES
"Urology  Progress Note    NAEO  Pain well controlled with oral prns and scheduled Toradol   Tolerating clears - no n/v  Not passing gas  Not been out of bed yet   Oneil in place    Exam  /82 (BP Location: Right arm)   Pulse 89   Temp 98.9  F (37.2  C) (Oral)   Resp 16   Ht 1.626 m (5' 4\")   Wt 78.4 kg (172 lb 14.4 oz)   SpO2 94%   BMI 29.68 kg/m    No acute distress  Unlabored breathing  Abdomen soft,  appropriately tender, moderately distended. Incisions c/d/I, covered with skin glue  MMEE serosanguinous  Oneil with clear yellow urine in tubing    /--  MEME 120/--    Labs  Recent Labs   Lab Test 07/09/25  0647 07/08/25  1023 06/10/25  0726 05/09/24  0733   WBC 11.8*  --  8.6 7.3   HGB 13.4  --  16.5 16.5   CR 0.86 0.94 0.99 0.85      Assessment/Plan  69 year old y/o male POD#1 s/p RALP for prostate cancer.     Neuro: scheduled Tylenol, Toradol, PRN oxy/dilaudid for pain control  CV: PTA metop and statin ordered  Pulm: IS while awake  FEN/GI: full liquid diet, MIVF @ 75/hr, will wean as PO intake improves   Endo: WONG  : Oneil catheter in place, to remain on discharge. Detrol available PRN.   Heme/ID: AM labs WNL.   Activity: up ad claudio, ambulate QID  PPx: SCDs.  Lines/Drains: MEME drain removed 7/9 AM.   Dispo: Home today versus tomorrow pending diet tolerance and ambulation.     Seen and examined. Will discuss with Dr. Josue.    Masha Barreto MD  Urology Resident PGY-5     Contacting the Urology Team     Please use the following job codes to reach the Urology Team. Note that you must use an in house phone and that job codes cannot receive text pages.   On weekdays, dial [phone] (or star-star-star 777 on the new [employer] telephones) then [phone] to reach the Adult Urology resident or PA on call  On weekdays, dial [phone] (or star-star-star 777 on the new [employer] telephones) then [phone] to reach the Pediatric Urology resident  On weeknights and weekends, dial [phone] (or star-star-star 777 on the new [employer] telephones) " then [phone] to reach the Urology resident on call (for both Adult and Pediatrics)

## 2025-07-09 NOTE — PLAN OF CARE
Date & Time: 7/9/25 4909-8847  Surgery/POD#: POD#1 Prostatectomy w/ pelvic lymphadenectomy  Behavior & Aggression: Green  Fall Risk: Yes  Orientation: A&Ox4  ABNL VS/O2: VSS on RA  ABNL Labs: See chart  Pain Management: x1 Ketorolac  Bowel/Bladder: Oneil in place; passing gas  Drains: Oneil, PIV infusing NaCl @75 mL/hr  Wounds/incisions: x3 lap sites, x1 mini  Diet: Regular  Number of times OUT OF BED this shift 2  Anticipated  DC Date: Pending today; waiting to see how pt tolerates regular diet

## 2025-07-09 NOTE — PROGRESS NOTES
Pt to floor oriented to room and call lights, denies pain, IV infusing, lung clear, room air, O2 sats low 90's, tolerates sips of clears, MEME drain in place, lap sites clean dry and intact

## 2025-07-09 NOTE — DISCHARGE SUMMARY
"[facility]   Urology Discharge Summary    Date of Admission: 7/8/2025  Date of Discharge: 07/09/2025    Admission Diagnosis:  Prostate cancer (H) [C61]    Discharge Diagnosis:  1. Same as above    Consultations:  None     Procedures:  Procedure(s):  PROSTATECTOMY, ROBOT-ASSISTED, WITH PELVIC LYMPHADENECTOMY    Brief HPI:  Eleuterio Camejo is a 69 year old year old male with a history of prostate cancer. After discussion of the risks, benefits, and alternatives, they underwent the aforementioned procedure.     Hospital Course:  The patient tolerated the procedure well without complications and was transferred to the floor post-operatively.The patient's diet was slowly advanced as bowel function returned. Pain was controlled with oral pain medication and the patient was able to ambulate and void without difficulty. The patient received appropriate education post operatively. On 07/09/2025 the patient was discharged to home. His MEME drain was removed on POD1 and he will discharge with a Oneil catheter in place until his follow-up appointment on 7/18/25.     Discharge Physical Exam:  /67 (BP Location: Right arm)   Pulse 86   Temp 98.4  F (36.9  C) (Oral)   Resp 16   Ht 1.626 m (5' 4\")   Wt 78.4 kg (172 lb 14.4 oz)   SpO2 94%   BMI 29.68 kg/m      No acute distress  Unlabored breathing  Abdomen soft,  appropriately tender, moderately distended. Incisions c/d/I, covered with skin glue  MEME serosanguinous  Oneil with clear yellow urine in tubing    Meds:       Review of your medicines        START taking        Dose / Directions   oxyCODONE 5 MG tablet  Commonly known as: ROXICODONE  Used for: Prostate cancer (H)      Dose: 5 mg  Take 1 tablet (5 mg) by mouth every 6 hours as needed for pain.  Quantity: 12 tablet  Refills: 0     SENNA-docusate sodium 8.6-50 MG tablet  Commonly known as: SENNA S  Used for: Prostate cancer (H)      Dose: 1 tablet  Take 1 tablet by mouth at " "bedtime.  Quantity: 14 tablet  Refills: 0            CONTINUE these medicines which have NOT CHANGED        Dose / Directions   aspirin 81 MG EC tablet      Dose: 81 mg  Take 81 mg by mouth daily.  Refills: 0     metoprolol tartrate 50 MG tablet  Commonly known as: LOPRESSOR  Used for: S/P CABG (coronary artery bypass graft)      Dose: 50 mg  TAKE 1 TABLET BY MOUTH TWICE A DAY  Quantity: 180 tablet  Refills: 1     Repatha SureClick 140 MG/ML prefilled autoinjector  Used for: Hyperlipidemia, unspecified hyperlipidemia type  Generic drug: evolocumab      INJECT 1 ML (140 MG) SUBCUTANEOUSLY EVERY 14 DAYS.  Quantity: 6 mL  Refills: 2     rosuvastatin 40 MG tablet  Commonly known as: CRESTOR  Used for: Hyperlipidemia      Dose: 40 mg  TAKE 1 TABLET BY MOUTH EVERY DAY  Quantity: 90 tablet  Refills: 2               Where to get your medicines        These medications were sent to [facility] [place], [place] - [address] [address]  [address] [address], [facility] [address]      Phone: [phone]   oxyCODONE 5 MG tablet  SENNA-docusate sodium 8.6-50 MG tablet         Additional instructions:  After Care       Future Labs/Procedures    Activity     Comments:    Your activity upon discharge: see discharge instructions    Diet     Comments:    Follow this diet upon discharge: see discharge instructions    Tubes and Drains     Comments:    Oneil catheter            Follow Up:  - Follow-up with your urologist as scheduled   - Call or return sooner than your regularly scheduled visit if you develop any of the following: fever (greater than 101.5), uncontrolled pain, uncontrolled nausea or vomiting, as well as increased redness, swelling, or drainage from your wound.     Phone numbers:   - Monday through Friday 8am to 4:30pm: Call [phone] with questions or to schedule or confirm appointment.    - Nights or weekends: call the after hours emergency pager - [phone] and tell the  \"I would like to " "page the Urology Resident on call.\"  - For emergencies, call 911    The patient was discussed with the staff on the day of discharge.     Masha Barreto MD  Urology Resident     "

## 2025-07-10 ENCOUNTER — CARE COORDINATION (OUTPATIENT)
Dept: UROLOGY | Facility: CLINIC | Age: 70
End: 2025-07-10
Payer: COMMERCIAL

## 2025-07-12 LAB
PATH REPORT.COMMENTS IMP SPEC: ABNORMAL
PATH REPORT.COMMENTS IMP SPEC: ABNORMAL
PATH REPORT.COMMENTS IMP SPEC: YES
PATH REPORT.FINAL DX SPEC: ABNORMAL
PATH REPORT.GROSS SPEC: ABNORMAL
PATH REPORT.MICROSCOPIC SPEC OTHER STN: ABNORMAL
PATH REPORT.RELEVANT HX SPEC: ABNORMAL
PATHOLOGY SYNOPTIC REPORT: ABNORMAL
PHOTO IMAGE: ABNORMAL

## 2025-08-10 ENCOUNTER — APPOINTMENT (OUTPATIENT)
Dept: CT IMAGING | Facility: CLINIC | Age: 70
End: 2025-08-10
Attending: FAMILY MEDICINE
Payer: COMMERCIAL

## 2025-08-10 ENCOUNTER — HOSPITAL ENCOUNTER (INPATIENT)
Facility: CLINIC | Age: 70
LOS: 1 days | Discharge: HOME | End: 2025-08-12
Attending: FAMILY MEDICINE | Admitting: HOSPITALIST
Payer: COMMERCIAL

## 2025-08-10 DIAGNOSIS — Z96.0 URETERAL STENT PRESENT: ICD-10-CM

## 2025-08-10 DIAGNOSIS — N13.30 HYDRONEPHROSIS OF RIGHT KIDNEY: Primary | ICD-10-CM

## 2025-08-10 DIAGNOSIS — R10.9 FLANK PAIN: ICD-10-CM

## 2025-08-10 PROBLEM — T83.122A: Status: ACTIVE | Noted: 2025-08-10

## 2025-08-10 LAB
ALBUMIN SERPL BCG-MCNC: 4.2 G/DL (ref 3.5–5.2)
ALBUMIN UR-MCNC: 30 MG/DL
ALP SERPL-CCNC: 82 U/L (ref 40–150)
ALT SERPL W P-5'-P-CCNC: 33 U/L (ref 0–70)
ANION GAP SERPL CALCULATED.3IONS-SCNC: 16 MMOL/L (ref 7–15)
APPEARANCE UR: CLEAR
AST SERPL W P-5'-P-CCNC: 34 U/L (ref 0–45)
BASOPHILS # BLD AUTO: 0 10E3/UL (ref 0–0.2)
BASOPHILS NFR BLD AUTO: 0 %
BILIRUB SERPL-MCNC: 0.3 MG/DL
BILIRUB UR QL STRIP: NEGATIVE
BILIRUBIN DIRECT (ROCHE PRO & PURE): 0.16 MG/DL (ref 0–0.45)
BUN SERPL-MCNC: 17.9 MG/DL (ref 8–23)
CALCIUM SERPL-MCNC: 9.5 MG/DL (ref 8.8–10.4)
CHLORIDE SERPL-SCNC: 103 MMOL/L (ref 98–107)
COLOR UR AUTO: ABNORMAL
CREAT SERPL-MCNC: 0.91 MG/DL (ref 0.67–1.17)
EGFRCR SERPLBLD CKD-EPI 2021: >90 ML/MIN/1.73M2
EOSINOPHIL # BLD AUTO: 0 10E3/UL (ref 0–0.7)
EOSINOPHIL NFR BLD AUTO: 0 %
ERYTHROCYTE [DISTWIDTH] IN BLOOD BY AUTOMATED COUNT: 13.2 % (ref 10–15)
GLUCOSE SERPL-MCNC: 116 MG/DL (ref 70–99)
GLUCOSE UR STRIP-MCNC: NEGATIVE MG/DL
HCO3 SERPL-SCNC: 18 MMOL/L (ref 22–29)
HCT VFR BLD AUTO: 41.6 % (ref 40–53)
HGB BLD-MCNC: 14.3 G/DL (ref 13.3–17.7)
HGB UR QL STRIP: ABNORMAL
IMM GRANULOCYTES # BLD: 0 10E3/UL
IMM GRANULOCYTES NFR BLD: 0 %
KETONES UR STRIP-MCNC: ABNORMAL MG/DL
LACTATE SERPL-SCNC: 1.6 MMOL/L (ref 0.7–2)
LACTATE SERPL-SCNC: 2.1 MMOL/L (ref 0.7–2)
LEUKOCYTE ESTERASE UR QL STRIP: ABNORMAL
LYMPHOCYTES # BLD AUTO: 2 10E3/UL (ref 0.8–5.3)
LYMPHOCYTES NFR BLD AUTO: 21 %
MAGNESIUM SERPL-MCNC: 2.2 MG/DL (ref 1.7–2.3)
MCH RBC QN AUTO: 31.2 PG (ref 26.5–33)
MCHC RBC AUTO-ENTMCNC: 34.4 G/DL (ref 31.5–36.5)
MCV RBC AUTO: 91 FL (ref 78–100)
MONOCYTES # BLD AUTO: 0.7 10E3/UL (ref 0–1.3)
MONOCYTES NFR BLD AUTO: 8 %
MUCOUS THREADS #/AREA URNS LPF: PRESENT /LPF
NEUTROPHILS # BLD AUTO: 6.6 10E3/UL (ref 1.6–8.3)
NEUTROPHILS NFR BLD AUTO: 71 %
NITRATE UR QL: NEGATIVE
NRBC # BLD AUTO: 0 10E3/UL
NRBC BLD AUTO-RTO: 0 /100
PH UR STRIP: 6.5 [PH] (ref 5–7)
PLATELET # BLD AUTO: 428 10E3/UL (ref 150–450)
POTASSIUM SERPL-SCNC: 3.7 MMOL/L (ref 3.4–5.3)
PROCALCITONIN SERPL IA-MCNC: 0.37 NG/ML
PROT SERPL-MCNC: 7.2 G/DL (ref 6.4–8.3)
RBC # BLD AUTO: 4.58 10E6/UL (ref 4.4–5.9)
RBC URINE: 114 /HPF
SODIUM SERPL-SCNC: 137 MMOL/L (ref 135–145)
SP GR UR STRIP: 1.02 (ref 1–1.03)
UROBILINOGEN UR STRIP-MCNC: NORMAL MG/DL
WBC # BLD AUTO: 9.4 10E3/UL (ref 4–11)
WBC URINE: 6 /HPF

## 2025-08-10 PROCEDURE — 83735 ASSAY OF MAGNESIUM: CPT | Performed by: FAMILY MEDICINE

## 2025-08-10 PROCEDURE — 99221 1ST HOSP IP/OBS SF/LOW 40: CPT | Performed by: UROLOGY

## 2025-08-10 PROCEDURE — 87040 BLOOD CULTURE FOR BACTERIA: CPT | Performed by: FAMILY MEDICINE

## 2025-08-10 PROCEDURE — 84155 ASSAY OF PROTEIN SERUM: CPT | Performed by: HOSPITALIST

## 2025-08-10 PROCEDURE — 36415 COLL VENOUS BLD VENIPUNCTURE: CPT | Performed by: FAMILY MEDICINE

## 2025-08-10 PROCEDURE — 258N000003 HC RX IP 258 OP 636: Performed by: FAMILY MEDICINE

## 2025-08-10 PROCEDURE — 85004 AUTOMATED DIFF WBC COUNT: CPT | Performed by: FAMILY MEDICINE

## 2025-08-10 PROCEDURE — 84145 PROCALCITONIN (PCT): CPT | Performed by: FAMILY MEDICINE

## 2025-08-10 PROCEDURE — 96374 THER/PROPH/DIAG INJ IV PUSH: CPT

## 2025-08-10 PROCEDURE — 96361 HYDRATE IV INFUSION ADD-ON: CPT

## 2025-08-10 PROCEDURE — 83605 ASSAY OF LACTIC ACID: CPT | Performed by: FAMILY MEDICINE

## 2025-08-10 PROCEDURE — 80048 BASIC METABOLIC PNL TOTAL CA: CPT | Performed by: FAMILY MEDICINE

## 2025-08-10 PROCEDURE — 81001 URINALYSIS AUTO W/SCOPE: CPT | Performed by: FAMILY MEDICINE

## 2025-08-10 PROCEDURE — 74176 CT ABD & PELVIS W/O CONTRAST: CPT

## 2025-08-10 PROCEDURE — 250N000011 HC RX IP 250 OP 636: Performed by: FAMILY MEDICINE

## 2025-08-10 PROCEDURE — 99285 EMERGENCY DEPT VISIT HI MDM: CPT | Mod: 25 | Performed by: FAMILY MEDICINE

## 2025-08-10 PROCEDURE — G0378 HOSPITAL OBSERVATION PER HR: HCPCS

## 2025-08-10 PROCEDURE — 258N000003 HC RX IP 258 OP 636: Performed by: HOSPITALIST

## 2025-08-10 PROCEDURE — 250N000013 HC RX MED GY IP 250 OP 250 PS 637: Performed by: HOSPITALIST

## 2025-08-10 PROCEDURE — 99222 1ST HOSP IP/OBS MODERATE 55: CPT | Performed by: HOSPITALIST

## 2025-08-10 RX ORDER — ACETAMINOPHEN 500 MG
1000 TABLET ORAL EVERY 6 HOURS PRN
COMMUNITY

## 2025-08-10 RX ORDER — OXYCODONE HYDROCHLORIDE 5 MG/1
5 TABLET ORAL EVERY 8 HOURS PRN
Status: DISCONTINUED | OUTPATIENT
Start: 2025-08-10 | End: 2025-08-12 | Stop reason: HOSPADM

## 2025-08-10 RX ORDER — METOPROLOL TARTRATE 50 MG
50 TABLET ORAL 2 TIMES DAILY
Status: DISCONTINUED | OUTPATIENT
Start: 2025-08-10 | End: 2025-08-12 | Stop reason: HOSPADM

## 2025-08-10 RX ORDER — OXYCODONE HYDROCHLORIDE 5 MG/1
5 TABLET ORAL EVERY 8 HOURS PRN
COMMUNITY
Start: 2025-08-08

## 2025-08-10 RX ORDER — ONDANSETRON 2 MG/ML
4 INJECTION INTRAMUSCULAR; INTRAVENOUS EVERY 6 HOURS PRN
Status: DISCONTINUED | OUTPATIENT
Start: 2025-08-10 | End: 2025-08-12 | Stop reason: HOSPADM

## 2025-08-10 RX ORDER — KETOROLAC TROMETHAMINE 15 MG/ML
15 INJECTION, SOLUTION INTRAMUSCULAR; INTRAVENOUS ONCE
Status: COMPLETED | OUTPATIENT
Start: 2025-08-10 | End: 2025-08-10

## 2025-08-10 RX ORDER — SODIUM CHLORIDE, SODIUM LACTATE, POTASSIUM CHLORIDE, CALCIUM CHLORIDE 600; 310; 30; 20 MG/100ML; MG/100ML; MG/100ML; MG/100ML
INJECTION, SOLUTION INTRAVENOUS CONTINUOUS
Status: DISCONTINUED | OUTPATIENT
Start: 2025-08-10 | End: 2025-08-12 | Stop reason: HOSPADM

## 2025-08-10 RX ORDER — AMOXICILLIN 250 MG
1 CAPSULE ORAL 2 TIMES DAILY PRN
Status: DISCONTINUED | OUTPATIENT
Start: 2025-08-10 | End: 2025-08-12 | Stop reason: HOSPADM

## 2025-08-10 RX ORDER — LISINOPRIL 10 MG/1
10 TABLET ORAL DAILY
COMMUNITY

## 2025-08-10 RX ORDER — HYDROMORPHONE HCL IN WATER/PF 6 MG/30 ML
0.2 PATIENT CONTROLLED ANALGESIA SYRINGE INTRAVENOUS
Status: DISCONTINUED | OUTPATIENT
Start: 2025-08-10 | End: 2025-08-12 | Stop reason: HOSPADM

## 2025-08-10 RX ORDER — OXYBUTYNIN CHLORIDE 5 MG/1
5 TABLET ORAL 3 TIMES DAILY PRN
COMMUNITY
Start: 2025-08-08

## 2025-08-10 RX ORDER — AMOXICILLIN 250 MG
2 CAPSULE ORAL 2 TIMES DAILY PRN
Status: DISCONTINUED | OUTPATIENT
Start: 2025-08-10 | End: 2025-08-12 | Stop reason: HOSPADM

## 2025-08-10 RX ORDER — LISINOPRIL 10 MG/1
10 TABLET ORAL DAILY
Status: DISCONTINUED | OUTPATIENT
Start: 2025-08-11 | End: 2025-08-12 | Stop reason: HOSPADM

## 2025-08-10 RX ORDER — ONDANSETRON 4 MG/1
4 TABLET, ORALLY DISINTEGRATING ORAL EVERY 6 HOURS PRN
Status: DISCONTINUED | OUTPATIENT
Start: 2025-08-10 | End: 2025-08-12 | Stop reason: HOSPADM

## 2025-08-10 RX ORDER — OXYBUTYNIN CHLORIDE 5 MG/1
5 TABLET ORAL 3 TIMES DAILY PRN
Status: DISCONTINUED | OUTPATIENT
Start: 2025-08-10 | End: 2025-08-12 | Stop reason: HOSPADM

## 2025-08-10 RX ORDER — LEVOFLOXACIN 750 MG/1
750 TABLET, FILM COATED ORAL DAILY
Status: DISCONTINUED | OUTPATIENT
Start: 2025-08-11 | End: 2025-08-12 | Stop reason: HOSPADM

## 2025-08-10 RX ORDER — NALOXONE HYDROCHLORIDE 0.4 MG/ML
0.2 INJECTION, SOLUTION INTRAMUSCULAR; INTRAVENOUS; SUBCUTANEOUS
Status: DISCONTINUED | OUTPATIENT
Start: 2025-08-10 | End: 2025-08-12 | Stop reason: HOSPADM

## 2025-08-10 RX ORDER — ASPIRIN 81 MG/1
81 TABLET ORAL DAILY
Status: DISCONTINUED | OUTPATIENT
Start: 2025-08-11 | End: 2025-08-12 | Stop reason: HOSPADM

## 2025-08-10 RX ORDER — PROCHLORPERAZINE MALEATE 5 MG/1
5 TABLET ORAL EVERY 6 HOURS PRN
Status: DISCONTINUED | OUTPATIENT
Start: 2025-08-10 | End: 2025-08-12 | Stop reason: HOSPADM

## 2025-08-10 RX ORDER — NALOXONE HYDROCHLORIDE 0.4 MG/ML
0.4 INJECTION, SOLUTION INTRAMUSCULAR; INTRAVENOUS; SUBCUTANEOUS
Status: DISCONTINUED | OUTPATIENT
Start: 2025-08-10 | End: 2025-08-12 | Stop reason: HOSPADM

## 2025-08-10 RX ORDER — LEVOFLOXACIN 750 MG/1
750 TABLET, FILM COATED ORAL DAILY
COMMUNITY
Start: 2025-08-08 | End: 2025-08-13

## 2025-08-10 RX ORDER — ACETAMINOPHEN 500 MG
1000 TABLET ORAL EVERY 6 HOURS PRN
Status: DISCONTINUED | OUTPATIENT
Start: 2025-08-10 | End: 2025-08-12 | Stop reason: HOSPADM

## 2025-08-10 RX ORDER — ROSUVASTATIN CALCIUM 10 MG/1
40 TABLET, COATED ORAL DAILY
Status: DISCONTINUED | OUTPATIENT
Start: 2025-08-11 | End: 2025-08-12 | Stop reason: HOSPADM

## 2025-08-10 RX ORDER — HYDROMORPHONE HCL IN WATER/PF 6 MG/30 ML
0.4 PATIENT CONTROLLED ANALGESIA SYRINGE INTRAVENOUS
Status: DISCONTINUED | OUTPATIENT
Start: 2025-08-10 | End: 2025-08-12 | Stop reason: HOSPADM

## 2025-08-10 RX ADMIN — OXYCODONE HYDROCHLORIDE 5 MG: 5 TABLET ORAL at 17:22

## 2025-08-10 RX ADMIN — METOPROLOL TARTRATE 50 MG: 50 TABLET, FILM COATED ORAL at 19:42

## 2025-08-10 RX ADMIN — SODIUM CHLORIDE 1000 ML: 0.9 INJECTION, SOLUTION INTRAVENOUS at 11:46

## 2025-08-10 RX ADMIN — KETOROLAC TROMETHAMINE 15 MG: 15 INJECTION, SOLUTION INTRAMUSCULAR; INTRAVENOUS at 11:46

## 2025-08-10 RX ADMIN — SODIUM CHLORIDE, SODIUM LACTATE, POTASSIUM CHLORIDE, AND CALCIUM CHLORIDE: .6; .31; .03; .02 INJECTION, SOLUTION INTRAVENOUS at 15:16

## 2025-08-10 ASSESSMENT — ACTIVITIES OF DAILY LIVING (ADL)
ADLS_ACUITY_SCORE: 43
ADLS_ACUITY_SCORE: 23
ADLS_ACUITY_SCORE: 43
ADLS_ACUITY_SCORE: 43
ADLS_ACUITY_SCORE: 23
ADLS_ACUITY_SCORE: 43
ADLS_ACUITY_SCORE: 23
ADLS_ACUITY_SCORE: 23
ADLS_ACUITY_SCORE: 43
ADLS_ACUITY_SCORE: 25
ADLS_ACUITY_SCORE: 43
ADLS_ACUITY_SCORE: 43

## 2025-08-10 ASSESSMENT — COLUMBIA-SUICIDE SEVERITY RATING SCALE - C-SSRS
2. HAVE YOU ACTUALLY HAD ANY THOUGHTS OF KILLING YOURSELF IN THE PAST MONTH?: NO
6. HAVE YOU EVER DONE ANYTHING, STARTED TO DO ANYTHING, OR PREPARED TO DO ANYTHING TO END YOUR LIFE?: NO
1. IN THE PAST MONTH, HAVE YOU WISHED YOU WERE DEAD OR WISHED YOU COULD GO TO SLEEP AND NOT WAKE UP?: NO

## 2025-08-11 ENCOUNTER — PATIENT OUTREACH (OUTPATIENT)
Dept: CARE COORDINATION | Facility: CLINIC | Age: 70
End: 2025-08-11
Payer: COMMERCIAL

## 2025-08-11 ENCOUNTER — APPOINTMENT (OUTPATIENT)
Dept: INTERVENTIONAL RADIOLOGY/VASCULAR | Facility: CLINIC | Age: 70
End: 2025-08-11
Attending: NURSE PRACTITIONER
Payer: COMMERCIAL

## 2025-08-11 PROBLEM — N13.30 HYDRONEPHROSIS: Status: ACTIVE | Noted: 2025-08-11

## 2025-08-11 LAB
ALBUMIN SERPL BCG-MCNC: 4 G/DL (ref 3.5–5.2)
ALP SERPL-CCNC: 79 U/L (ref 40–150)
ALT SERPL W P-5'-P-CCNC: 30 U/L (ref 0–70)
ANION GAP SERPL CALCULATED.3IONS-SCNC: 11 MMOL/L (ref 7–15)
AST SERPL W P-5'-P-CCNC: 28 U/L (ref 0–45)
BASOPHILS # BLD AUTO: 0 10E3/UL (ref 0–0.2)
BASOPHILS NFR BLD AUTO: 0 %
BILIRUB SERPL-MCNC: 0.4 MG/DL
BUN SERPL-MCNC: 13.6 MG/DL (ref 8–23)
CALCIUM SERPL-MCNC: 9.4 MG/DL (ref 8.8–10.4)
CHLORIDE SERPL-SCNC: 104 MMOL/L (ref 98–107)
CREAT SERPL-MCNC: 0.91 MG/DL (ref 0.67–1.17)
EGFRCR SERPLBLD CKD-EPI 2021: >90 ML/MIN/1.73M2
EOSINOPHIL # BLD AUTO: 0.2 10E3/UL (ref 0–0.7)
EOSINOPHIL NFR BLD AUTO: 2 %
ERYTHROCYTE [DISTWIDTH] IN BLOOD BY AUTOMATED COUNT: 13.2 % (ref 10–15)
GLUCOSE SERPL-MCNC: 109 MG/DL (ref 70–99)
HCO3 SERPL-SCNC: 23 MMOL/L (ref 22–29)
HCT VFR BLD AUTO: 41.4 % (ref 40–53)
HGB BLD-MCNC: 13.9 G/DL (ref 13.3–17.7)
IMM GRANULOCYTES # BLD: 0 10E3/UL
IMM GRANULOCYTES NFR BLD: 0 %
INR PPP: 1.1 (ref 0.85–1.15)
LYMPHOCYTES # BLD AUTO: 3 10E3/UL (ref 0.8–5.3)
LYMPHOCYTES NFR BLD AUTO: 34 %
MCH RBC QN AUTO: 31 PG (ref 26.5–33)
MCHC RBC AUTO-ENTMCNC: 33.6 G/DL (ref 31.5–36.5)
MCV RBC AUTO: 92 FL (ref 78–100)
MONOCYTES # BLD AUTO: 0.7 10E3/UL (ref 0–1.3)
MONOCYTES NFR BLD AUTO: 8 %
NEUTROPHILS # BLD AUTO: 4.9 10E3/UL (ref 1.6–8.3)
NEUTROPHILS NFR BLD AUTO: 56 %
NRBC # BLD AUTO: 0 10E3/UL
NRBC BLD AUTO-RTO: 0 /100
PLATELET # BLD AUTO: 414 10E3/UL (ref 150–450)
POTASSIUM SERPL-SCNC: 4.4 MMOL/L (ref 3.4–5.3)
PROT SERPL-MCNC: 6.8 G/DL (ref 6.4–8.3)
PROTHROMBIN TIME: 14.4 SECONDS (ref 11.8–14.8)
RBC # BLD AUTO: 4.48 10E6/UL (ref 4.4–5.9)
SODIUM SERPL-SCNC: 138 MMOL/L (ref 135–145)
WBC # BLD AUTO: 8.9 10E3/UL (ref 4–11)

## 2025-08-11 PROCEDURE — 0T9330Z DRAINAGE OF RIGHT KIDNEY PELVIS WITH DRAINAGE DEVICE, PERCUTANEOUS APPROACH: ICD-10-PCS | Performed by: STUDENT IN AN ORGANIZED HEALTH CARE EDUCATION/TRAINING PROGRAM

## 2025-08-11 PROCEDURE — 85610 PROTHROMBIN TIME: CPT | Performed by: NURSE PRACTITIONER

## 2025-08-11 PROCEDURE — 99232 SBSQ HOSP IP/OBS MODERATE 35: CPT | Performed by: HOSPITALIST

## 2025-08-11 PROCEDURE — 250N000011 HC RX IP 250 OP 636: Performed by: HOSPITALIST

## 2025-08-11 PROCEDURE — 250N000013 HC RX MED GY IP 250 OP 250 PS 637: Performed by: HOSPITALIST

## 2025-08-11 PROCEDURE — 80053 COMPREHEN METABOLIC PANEL: CPT | Performed by: HOSPITALIST

## 2025-08-11 PROCEDURE — 120N000001 HC R&B MED SURG/OB

## 2025-08-11 PROCEDURE — 272N000508 HC NEEDLE CR8

## 2025-08-11 PROCEDURE — 99152 MOD SED SAME PHYS/QHP 5/>YRS: CPT

## 2025-08-11 PROCEDURE — 36415 COLL VENOUS BLD VENIPUNCTURE: CPT | Performed by: NURSE PRACTITIONER

## 2025-08-11 PROCEDURE — C1729 CATH, DRAINAGE: HCPCS

## 2025-08-11 PROCEDURE — G0378 HOSPITAL OBSERVATION PER HR: HCPCS

## 2025-08-11 PROCEDURE — C1769 GUIDE WIRE: HCPCS

## 2025-08-11 PROCEDURE — 96375 TX/PRO/DX INJ NEW DRUG ADDON: CPT

## 2025-08-11 PROCEDURE — 250N000011 HC RX IP 250 OP 636: Performed by: NURSE PRACTITIONER

## 2025-08-11 PROCEDURE — 258N000003 HC RX IP 258 OP 636: Performed by: HOSPITALIST

## 2025-08-11 PROCEDURE — 36415 COLL VENOUS BLD VENIPUNCTURE: CPT | Performed by: HOSPITALIST

## 2025-08-11 PROCEDURE — 85025 COMPLETE CBC W/AUTO DIFF WBC: CPT | Performed by: HOSPITALIST

## 2025-08-11 PROCEDURE — 250N000009 HC RX 250: Performed by: STUDENT IN AN ORGANIZED HEALTH CARE EDUCATION/TRAINING PROGRAM

## 2025-08-11 PROCEDURE — 50432 PLMT NEPHROSTOMY CATHETER: CPT

## 2025-08-11 PROCEDURE — 250N000011 HC RX IP 250 OP 636: Performed by: STUDENT IN AN ORGANIZED HEALTH CARE EDUCATION/TRAINING PROGRAM

## 2025-08-11 RX ORDER — FENTANYL CITRATE 50 UG/ML
25-50 INJECTION, SOLUTION INTRAMUSCULAR; INTRAVENOUS EVERY 5 MIN PRN
Refills: 0 | Status: DISCONTINUED | OUTPATIENT
Start: 2025-08-11 | End: 2025-08-11 | Stop reason: HOSPADM

## 2025-08-11 RX ORDER — NALOXONE HYDROCHLORIDE 0.4 MG/ML
0.4 INJECTION, SOLUTION INTRAMUSCULAR; INTRAVENOUS; SUBCUTANEOUS
Status: DISCONTINUED | OUTPATIENT
Start: 2025-08-11 | End: 2025-08-11 | Stop reason: HOSPADM

## 2025-08-11 RX ORDER — FLUMAZENIL 0.1 MG/ML
0.2 INJECTION, SOLUTION INTRAVENOUS
Status: DISCONTINUED | OUTPATIENT
Start: 2025-08-11 | End: 2025-08-11 | Stop reason: HOSPADM

## 2025-08-11 RX ORDER — NALOXONE HYDROCHLORIDE 0.4 MG/ML
0.2 INJECTION, SOLUTION INTRAMUSCULAR; INTRAVENOUS; SUBCUTANEOUS
Status: DISCONTINUED | OUTPATIENT
Start: 2025-08-11 | End: 2025-08-11 | Stop reason: HOSPADM

## 2025-08-11 RX ORDER — CEFAZOLIN SODIUM/WATER 2 G/20 ML
2 SYRINGE (ML) INTRAVENOUS ONCE
Status: COMPLETED | OUTPATIENT
Start: 2025-08-11 | End: 2025-08-11

## 2025-08-11 RX ADMIN — OXYCODONE HYDROCHLORIDE 5 MG: 5 TABLET ORAL at 01:21

## 2025-08-11 RX ADMIN — SODIUM CHLORIDE, SODIUM LACTATE, POTASSIUM CHLORIDE, AND CALCIUM CHLORIDE: .6; .31; .03; .02 INJECTION, SOLUTION INTRAVENOUS at 17:42

## 2025-08-11 RX ADMIN — LISINOPRIL 10 MG: 10 TABLET ORAL at 07:45

## 2025-08-11 RX ADMIN — HYDROMORPHONE HYDROCHLORIDE 0.2 MG: 0.2 INJECTION, SOLUTION INTRAMUSCULAR; INTRAVENOUS; SUBCUTANEOUS at 04:26

## 2025-08-11 RX ADMIN — ASPIRIN 81 MG: 81 TABLET, COATED ORAL at 07:45

## 2025-08-11 RX ADMIN — MIDAZOLAM HYDROCHLORIDE 1 MG: 1 INJECTION, SOLUTION INTRAMUSCULAR; INTRAVENOUS at 16:41

## 2025-08-11 RX ADMIN — FENTANYL CITRATE 50 MCG: 50 INJECTION INTRAMUSCULAR; INTRAVENOUS at 16:59

## 2025-08-11 RX ADMIN — OXYCODONE HYDROCHLORIDE 5 MG: 5 TABLET ORAL at 20:20

## 2025-08-11 RX ADMIN — ROSUVASTATIN CALCIUM 40 MG: 10 TABLET, FILM COATED ORAL at 07:45

## 2025-08-11 RX ADMIN — SODIUM CHLORIDE, SODIUM LACTATE, POTASSIUM CHLORIDE, AND CALCIUM CHLORIDE: .6; .31; .03; .02 INJECTION, SOLUTION INTRAVENOUS at 04:23

## 2025-08-11 RX ADMIN — METOPROLOL TARTRATE 50 MG: 50 TABLET, FILM COATED ORAL at 07:45

## 2025-08-11 RX ADMIN — Medication 2 G: at 15:40

## 2025-08-11 RX ADMIN — LIDOCAINE HYDROCHLORIDE 20 ML: 10 INJECTION, SOLUTION INFILTRATION; PERINEURAL at 17:00

## 2025-08-11 RX ADMIN — METOPROLOL TARTRATE 50 MG: 50 TABLET, FILM COATED ORAL at 20:20

## 2025-08-11 RX ADMIN — LEVOFLOXACIN 750 MG: 750 TABLET, FILM COATED ORAL at 07:48

## 2025-08-11 RX ADMIN — FENTANYL CITRATE 50 MCG: 50 INJECTION INTRAMUSCULAR; INTRAVENOUS at 16:41

## 2025-08-11 ASSESSMENT — ACTIVITIES OF DAILY LIVING (ADL)
ADLS_ACUITY_SCORE: 25
ADLS_ACUITY_SCORE: 25
ADLS_ACUITY_SCORE: 40
ADLS_ACUITY_SCORE: 27
ADLS_ACUITY_SCORE: 25
ADLS_ACUITY_SCORE: 40
ADLS_ACUITY_SCORE: 40
ADLS_ACUITY_SCORE: 25
ADLS_ACUITY_SCORE: 27
ADLS_ACUITY_SCORE: 40
ADLS_ACUITY_SCORE: 25
ADLS_ACUITY_SCORE: 40
ADLS_ACUITY_SCORE: 27
ADLS_ACUITY_SCORE: 40
ADLS_ACUITY_SCORE: 25
ADLS_ACUITY_SCORE: 27
ADLS_ACUITY_SCORE: 40

## 2025-08-12 ENCOUNTER — TELEPHONE (OUTPATIENT)
Dept: UROLOGY | Facility: CLINIC | Age: 70
End: 2025-08-12
Payer: COMMERCIAL

## 2025-08-12 VITALS
WEIGHT: 159.6 LBS | HEIGHT: 66 IN | HEART RATE: 82 BPM | RESPIRATION RATE: 20 BRPM | BODY MASS INDEX: 25.65 KG/M2 | DIASTOLIC BLOOD PRESSURE: 92 MMHG | SYSTOLIC BLOOD PRESSURE: 157 MMHG | OXYGEN SATURATION: 97 % | TEMPERATURE: 98 F

## 2025-08-12 DIAGNOSIS — N13.30 HYDRONEPHROSIS: Primary | ICD-10-CM

## 2025-08-12 PROCEDURE — 250N000011 HC RX IP 250 OP 636: Performed by: HOSPITALIST

## 2025-08-12 PROCEDURE — 258N000003 HC RX IP 258 OP 636: Performed by: HOSPITALIST

## 2025-08-12 PROCEDURE — 250N000013 HC RX MED GY IP 250 OP 250 PS 637: Performed by: HOSPITALIST

## 2025-08-12 PROCEDURE — 99239 HOSP IP/OBS DSCHRG MGMT >30: CPT | Performed by: HOSPITALIST

## 2025-08-12 RX ADMIN — ASPIRIN 81 MG: 81 TABLET, COATED ORAL at 08:23

## 2025-08-12 RX ADMIN — HYDROMORPHONE HYDROCHLORIDE 0.2 MG: 0.2 INJECTION, SOLUTION INTRAMUSCULAR; INTRAVENOUS; SUBCUTANEOUS at 00:59

## 2025-08-12 RX ADMIN — LISINOPRIL 10 MG: 10 TABLET ORAL at 08:23

## 2025-08-12 RX ADMIN — SODIUM CHLORIDE, SODIUM LACTATE, POTASSIUM CHLORIDE, AND CALCIUM CHLORIDE: .6; .31; .03; .02 INJECTION, SOLUTION INTRAVENOUS at 06:32

## 2025-08-12 RX ADMIN — LEVOFLOXACIN 750 MG: 750 TABLET, FILM COATED ORAL at 08:24

## 2025-08-12 RX ADMIN — METOPROLOL TARTRATE 50 MG: 50 TABLET, FILM COATED ORAL at 08:23

## 2025-08-12 RX ADMIN — ROSUVASTATIN CALCIUM 40 MG: 10 TABLET, FILM COATED ORAL at 08:23

## 2025-08-12 RX ADMIN — SENNOSIDES AND DOCUSATE SODIUM 2 TABLET: 50; 8.6 TABLET ORAL at 08:27

## 2025-08-12 ASSESSMENT — ACTIVITIES OF DAILY LIVING (ADL)
ADLS_ACUITY_SCORE: 40

## 2025-08-13 ENCOUNTER — PATIENT OUTREACH (OUTPATIENT)
Dept: CARE COORDINATION | Facility: CLINIC | Age: 70
End: 2025-08-13
Payer: COMMERCIAL

## 2025-08-14 LAB
BACTERIA SPEC CULT: NORMAL
BACTERIA SPEC CULT: NORMAL

## 2025-08-15 LAB
BACTERIA SPEC CULT: NO GROWTH
BACTERIA SPEC CULT: NO GROWTH

## 2025-08-28 ENCOUNTER — OFFICE VISIT (OUTPATIENT)
Dept: UROLOGY | Facility: CLINIC | Age: 70
End: 2025-08-28
Payer: COMMERCIAL

## 2025-08-28 VITALS
DIASTOLIC BLOOD PRESSURE: 95 MMHG | BODY MASS INDEX: 25.55 KG/M2 | HEIGHT: 66 IN | OXYGEN SATURATION: 98 % | SYSTOLIC BLOOD PRESSURE: 149 MMHG | WEIGHT: 159 LBS | HEART RATE: 86 BPM

## 2025-08-28 DIAGNOSIS — Z90.79 STATUS POST PROSTATECTOMY: ICD-10-CM

## 2025-08-28 DIAGNOSIS — Q62.39 CONGENITAL OBSTRUCTION OF URETEROPELVIC JUNCTION: Primary | ICD-10-CM

## 2025-08-28 DIAGNOSIS — N39.3 STRESS INCONTINENCE OF URINE: ICD-10-CM

## 2025-08-28 DIAGNOSIS — Z85.46 PERSONAL HISTORY OF MALIGNANT NEOPLASM OF PROSTATE: ICD-10-CM

## 2025-08-28 LAB — PSA SERPL-MCNC: <0.04 UG/L (ref 0–4)

## 2025-08-28 RX ORDER — LIDOCAINE HYDROCHLORIDE 20 MG/ML
JELLY TOPICAL ONCE
Status: COMPLETED | OUTPATIENT
Start: 2025-08-28 | End: 2025-08-28

## 2025-08-28 RX ADMIN — LIDOCAINE HYDROCHLORIDE 5 ML: 20 JELLY TOPICAL at 08:40

## 2025-08-28 ASSESSMENT — PAIN SCALES - GENERAL: PAINLEVEL_OUTOF10: NO PAIN (0)

## 2025-09-04 ENCOUNTER — TELEPHONE (OUTPATIENT)
Dept: FAMILY MEDICINE | Facility: CLINIC | Age: 70
End: 2025-09-04
Payer: COMMERCIAL

## (undated) DEVICE — DAVINCI HOT SHEARS TIP COVER  400180

## (undated) DEVICE — SU WND CLOSURE VLOC 90 ABS 3-0 VIOLET 6" CV-23 VLOCM0804

## (undated) DEVICE — DAVINCI XI MONOPOLAR SCISSORS HOT SHEARS 8MM 470179

## (undated) DEVICE — CATH FOLEY 18FR 5ML LATEX 0165SI18

## (undated) DEVICE — PACK DAVINCI UROLOGY SBA15UDFSG

## (undated) DEVICE — DAVINCI XI SEAL UNIVERSAL 5-12MM 470500

## (undated) DEVICE — SU WND CLOSURE V-LOC 3-0 CV-23 6" VLOCM1904

## (undated) DEVICE — SU WND CLOSURE VLOC 180 ABS 2-0 9" GS-21 VLOCL0345

## (undated) DEVICE — PROTECTOR ARM ONE-STEP TRENDELENBURG 40418

## (undated) DEVICE — WIPES FOLEY CARE SURESTEP PROVON DFC100

## (undated) DEVICE — CLIP ENDO HEMO-LOC PURPLE LG 544240

## (undated) DEVICE — DRAIN JACKSON PRATT 15FR ROUND SU130-1323

## (undated) DEVICE — SYR 10ML FINGER CONTROL W/O NDL 309695

## (undated) DEVICE — ANTIFOG SOLUTION SEE SHARP 150M TROCAR SWABS 30978 (COI)

## (undated) DEVICE — TUBING CONMED AIRSEAL SMOKE EVAC INSUFFLATION ASM-EVAC

## (undated) DEVICE — SOL NACL 0.9% INJ 1000ML BAG 2B1324X

## (undated) DEVICE — ENDO POUCH RETRIEVAL SYSTEM OD11 MM 265 ML CD005

## (undated) DEVICE — SURGICEL HEMOSTAT 2X14" 1951

## (undated) DEVICE — LINEN TOWEL PACK X5 5464

## (undated) DEVICE — ESU GROUND PAD UNIVERSAL W/O CORD

## (undated) DEVICE — NDL INSUFFLATION 13GA 120MM C2201

## (undated) DEVICE — SU SILK 2-0 FSL 18" 677G

## (undated) DEVICE — KIT TURNOVER FAIRVIEW SOUTHDALE FULL SP3889

## (undated) DEVICE — DAVINCI XI DRAPE COLUMN 470341

## (undated) DEVICE — CLEANER INST PRE-KLENZ SOAK SHIELD TUBE 6 ML MEDIUM 2D66J4

## (undated) DEVICE — SOL WATER IRRIG 1000ML BOTTLE 2F7114

## (undated) DEVICE — SU PDS II 1 CT MONOFIL Z353H

## (undated) DEVICE — CATH HOLDER STRAP 36600

## (undated) DEVICE — SU MONOCRYL 4-0 PS-2 18" UND Y496G

## (undated) DEVICE — SPONGE RAY-TEC 4X8" 7318

## (undated) DEVICE — SUCTION MANIFOLD NEPTUNE 2 SYS 4 PORT 0702-020-000

## (undated) DEVICE — SUCTION IRR STRYKERFLOW II W/TIP 250-070-520

## (undated) DEVICE — DRAIN JACKSON PRATT RESERVOIR 100ML SU130-1305

## (undated) DEVICE — DAVINCI XI DRAPE ARM 470015

## (undated) DEVICE — ENDO TROCAR CONMED AIRSEAL BLADELESS 12X120MM IAS12-120LP

## (undated) DEVICE — JELLY LUBRICATING SURGILUBE 2OZ TUBE

## (undated) DEVICE — DAVINCI XI GRASPER ENDOWRIST PROGRASP 470093

## (undated) DEVICE — KIT POSITIONER NUBLUE XL TRND CHST PD BD STRAP TP3000E-S-NB

## (undated) DEVICE — RULER SURGICAL PLASTIC STRL LF CS628

## (undated) DEVICE — DAVINCI XI NDL DRIVER LARGE 470006

## (undated) DEVICE — BLADE CLIPPER 4406

## (undated) RX ORDER — BUPIVACAINE HYDROCHLORIDE 2.5 MG/ML
INJECTION, SOLUTION EPIDURAL; INFILTRATION; INTRACAUDAL; PERINEURAL
Status: DISPENSED
Start: 2025-07-08

## (undated) RX ORDER — PROPOFOL 10 MG/ML
INJECTION, EMULSION INTRAVENOUS
Status: DISPENSED
Start: 2025-07-08

## (undated) RX ORDER — HYDROMORPHONE HYDROCHLORIDE 1 MG/ML
INJECTION, SOLUTION INTRAMUSCULAR; INTRAVENOUS; SUBCUTANEOUS
Status: DISPENSED
Start: 2025-07-08

## (undated) RX ORDER — ONDANSETRON 2 MG/ML
INJECTION INTRAMUSCULAR; INTRAVENOUS
Status: DISPENSED
Start: 2025-07-08

## (undated) RX ORDER — CEFAZOLIN SODIUM/WATER 2 G/20 ML
SYRINGE (ML) INTRAVENOUS
Status: DISPENSED
Start: 2025-07-08

## (undated) RX ORDER — LIDOCAINE HYDROCHLORIDE 10 MG/ML
INJECTION, SOLUTION INFILTRATION; PERINEURAL
Status: DISPENSED
Start: 2025-08-11

## (undated) RX ORDER — APREPITANT 40 MG/1
CAPSULE ORAL
Status: DISPENSED
Start: 2025-07-08

## (undated) RX ORDER — DEXAMETHASONE SODIUM PHOSPHATE 4 MG/ML
INJECTION, SOLUTION INTRA-ARTICULAR; INTRALESIONAL; INTRAMUSCULAR; INTRAVENOUS; SOFT TISSUE
Status: DISPENSED
Start: 2025-07-08

## (undated) RX ORDER — CEFAZOLIN SODIUM/WATER 2 G/20 ML
SYRINGE (ML) INTRAVENOUS
Status: DISPENSED
Start: 2025-08-11

## (undated) RX ORDER — ACETAMINOPHEN 325 MG/1
TABLET ORAL
Status: DISPENSED
Start: 2025-07-08

## (undated) RX ORDER — FENTANYL CITRATE 50 UG/ML
INJECTION, SOLUTION INTRAMUSCULAR; INTRAVENOUS
Status: DISPENSED
Start: 2025-08-11

## (undated) RX ORDER — FENTANYL CITRATE 50 UG/ML
INJECTION, SOLUTION INTRAMUSCULAR; INTRAVENOUS
Status: DISPENSED
Start: 2025-07-08